# Patient Record
Sex: MALE | Race: WHITE | Employment: OTHER | URBAN - METROPOLITAN AREA
[De-identification: names, ages, dates, MRNs, and addresses within clinical notes are randomized per-mention and may not be internally consistent; named-entity substitution may affect disease eponyms.]

---

## 2018-02-13 ENCOUNTER — HOSPITAL ENCOUNTER (OUTPATIENT)
Dept: OTHER | Age: 43
Discharge: HOME | End: 2018-02-13
Attending: EMERGENCY MEDICINE | Admitting: EMERGENCY MEDICINE

## 2018-02-14 LAB
A/G RATIO: 1.4 (ref 1.1–2.2)
ALBUMIN SERPL-MCNC: 4 G/DL (ref 3.4–5)
ALP BLD-CCNC: 99 U/L (ref 40–129)
ALT SERPL-CCNC: 9 U/L (ref 10–40)
ANION GAP SERPL CALCULATED.3IONS-SCNC: 14 MMOL/L (ref 3–16)
AST SERPL-CCNC: 19 U/L (ref 15–37)
BASOPHILS ABSOLUTE: 0.1 K/UL (ref 0–0.2)
BASOPHILS RELATIVE PERCENT: 1.2 %
BILIRUB SERPL-MCNC: 0.3 MG/DL (ref 0–1)
BUN BLDV-MCNC: 9 MG/DL (ref 7–20)
CALCIUM SERPL-MCNC: 8.9 MG/DL (ref 8.3–10.6)
CHLORIDE BLD-SCNC: 101 MMOL/L (ref 99–110)
CO2: 26 MMOL/L (ref 21–32)
CREAT SERPL-MCNC: 1 MG/DL (ref 0.9–1.3)
EOSINOPHILS ABSOLUTE: 0.1 K/UL (ref 0–0.6)
EOSINOPHILS RELATIVE PERCENT: 0.8 %
GFR AFRICAN AMERICAN: >60
GFR NON-AFRICAN AMERICAN: >60
GLOBULIN: 2.9 G/DL
GLUCOSE BLD-MCNC: 74 MG/DL (ref 70–99)
HBV SURFACE AB TITR SER: 8.55 MIU/ML
HCT VFR BLD CALC: 48.4 % (ref 40.5–52.5)
HEMOGLOBIN: 15.6 G/DL (ref 13.5–17.5)
HEPATITIS C ANTIBODY INTERPRETATION: NORMAL
LYMPHOCYTES ABSOLUTE: 1.5 K/UL (ref 1–5.1)
LYMPHOCYTES RELATIVE PERCENT: 18.9 %
MCH RBC QN AUTO: 30.9 PG (ref 26–34)
MCHC RBC AUTO-ENTMCNC: 32.2 G/DL (ref 31–36)
MCV RBC AUTO: 96.1 FL (ref 80–100)
MONOCYTES ABSOLUTE: 1.1 K/UL (ref 0–1.3)
MONOCYTES RELATIVE PERCENT: 13.8 %
NEUTROPHILS ABSOLUTE: 5.1 K/UL (ref 1.7–7.7)
NEUTROPHILS RELATIVE PERCENT: 65.3 %
PDW BLD-RTO: 14.9 % (ref 12.4–15.4)
PLATELET # BLD: 247 K/UL (ref 135–450)
PMV BLD AUTO: 10.4 FL (ref 5–10.5)
POTASSIUM SERPL-SCNC: 3.7 MMOL/L (ref 3.5–5.1)
RBC # BLD: 5.03 M/UL (ref 4.2–5.9)
SODIUM BLD-SCNC: 141 MMOL/L (ref 136–145)
T4 FREE: 1.1 NG/DL (ref 0.9–1.8)
TOTAL PROTEIN: 6.9 G/DL (ref 6.4–8.2)
TSH SERPL DL<=0.05 MIU/L-ACNC: 1.07 UIU/ML (ref 0.27–4.2)
WBC # BLD: 7.8 K/UL (ref 4–11)

## 2018-02-15 LAB
HIV AG/AB: NORMAL
HIV ANTIGEN: NORMAL
HIV-1 ANTIBODY: NORMAL
HIV-2 AB: NORMAL
RPR: NORMAL

## 2018-02-16 LAB
EER HCV RNA QNT PCR: NORMAL
HCV RNA QNT REAL-TIME PCR INTERP: NOT DETECTED
HCV RNA, QUANTITATIVE REAL TIME PCR: <1.2 LOG IU
HEPATITIS C RNA PCR QUANT: <15 IU/ML

## 2018-08-09 ENCOUNTER — HOSPITAL ENCOUNTER (OUTPATIENT)
Dept: NON INVASIVE DIAGNOSTICS | Age: 43
Discharge: HOME | End: 2018-08-09
Payer: MEDICARE

## 2018-08-09 NOTE — PROGRESS NOTES
Pt unable to completed GXT duration < 50 sec due to Leg discomfort / poor  Exercise tolerance  . Call placed to ordering physician's office with update.  Pt denied chest discomfort released with instructions to follow up with MD's office

## 2018-08-29 ENCOUNTER — TELEPHONE (OUTPATIENT)
Dept: FAMILY MEDICINE CLINIC | Age: 43
End: 2018-08-29

## 2018-08-29 NOTE — TELEPHONE ENCOUNTER
Scheduled new pt appt for 9/21. Has medicare. Said he no longer has the medicaid. Told him if patient's are on controlled meds they may be referred out to specialist to manage those meds. Said he is on gabapentin. Charlotte Johnson it is the only thing that helps his back. Asking if that is a medication that can be prescribed by our office of if he will need to see a specialist for it.

## 2018-08-31 ENCOUNTER — TELEPHONE (OUTPATIENT)
Dept: FAMILY MEDICINE CLINIC | Age: 43
End: 2018-08-31

## 2018-08-31 NOTE — TELEPHONE ENCOUNTER
Patient called back regarding the previous message that was closed out  States he used to have a psychiatrist but no longer does and it's been awhile  He will discuss medications and a referral at his appt unless Dr Coretta Savage would like to refer him to a psychatrist and get an appt as well  Please advise

## 2018-08-31 NOTE — TELEPHONE ENCOUNTER
We will talk about it on the first visit.  Typically I would prefer for him to see a psychiatrist but would not mind sending in some of his medicines until he can get a psychiatrist.

## 2018-09-21 ENCOUNTER — OFFICE VISIT (OUTPATIENT)
Dept: FAMILY MEDICINE CLINIC | Age: 43
End: 2018-09-21

## 2018-09-21 ENCOUNTER — TELEPHONE (OUTPATIENT)
Dept: FAMILY MEDICINE CLINIC | Age: 43
End: 2018-09-21

## 2018-09-21 VITALS
OXYGEN SATURATION: 96 % | HEART RATE: 92 BPM | DIASTOLIC BLOOD PRESSURE: 82 MMHG | SYSTOLIC BLOOD PRESSURE: 124 MMHG | WEIGHT: 118 LBS | BODY MASS INDEX: 17.48 KG/M2 | HEIGHT: 69 IN

## 2018-09-21 DIAGNOSIS — F33.3 SEVERE RECURRENT MAJOR DEPRESSIVE DISORDER WITH PSYCHOTIC FEATURES (HCC): ICD-10-CM

## 2018-09-21 DIAGNOSIS — G89.29 CHRONIC BILATERAL THORACIC BACK PAIN: ICD-10-CM

## 2018-09-21 DIAGNOSIS — M54.6 CHRONIC BILATERAL THORACIC BACK PAIN: ICD-10-CM

## 2018-09-21 DIAGNOSIS — F03.90 DEMENTIA WITHOUT BEHAVIORAL DISTURBANCE, UNSPECIFIED DEMENTIA TYPE: ICD-10-CM

## 2018-09-21 DIAGNOSIS — G43.109 MIGRAINE WITH AURA AND WITHOUT STATUS MIGRAINOSUS, NOT INTRACTABLE: Primary | ICD-10-CM

## 2018-09-21 DIAGNOSIS — Z23 NEED FOR PNEUMOCOCCAL VACCINATION: ICD-10-CM

## 2018-09-21 DIAGNOSIS — F51.01 PRIMARY INSOMNIA: ICD-10-CM

## 2018-09-21 PROCEDURE — G8427 DOCREV CUR MEDS BY ELIG CLIN: HCPCS | Performed by: FAMILY MEDICINE

## 2018-09-21 PROCEDURE — G8419 CALC BMI OUT NRM PARAM NOF/U: HCPCS | Performed by: FAMILY MEDICINE

## 2018-09-21 PROCEDURE — 4004F PT TOBACCO SCREEN RCVD TLK: CPT | Performed by: FAMILY MEDICINE

## 2018-09-21 PROCEDURE — G0009 ADMIN PNEUMOCOCCAL VACCINE: HCPCS | Performed by: FAMILY MEDICINE

## 2018-09-21 PROCEDURE — 99203 OFFICE O/P NEW LOW 30 MIN: CPT | Performed by: FAMILY MEDICINE

## 2018-09-21 PROCEDURE — 90732 PPSV23 VACC 2 YRS+ SUBQ/IM: CPT | Performed by: FAMILY MEDICINE

## 2018-09-21 RX ORDER — TRAZODONE HYDROCHLORIDE 100 MG/1
100 TABLET ORAL NIGHTLY
Qty: 90 TABLET | Refills: 1 | Status: SHIPPED | OUTPATIENT
Start: 2018-09-21 | End: 2018-11-08

## 2018-09-21 RX ORDER — CITALOPRAM 20 MG/1
20 TABLET ORAL DAILY
Qty: 90 TABLET | Refills: 1 | Status: SHIPPED | OUTPATIENT
Start: 2018-09-21 | End: 2018-11-15

## 2018-09-21 RX ORDER — GABAPENTIN 600 MG/1
600 TABLET ORAL 3 TIMES DAILY
Qty: 90 TABLET | Refills: 2 | Status: SHIPPED | OUTPATIENT
Start: 2018-09-21 | End: 2018-11-26 | Stop reason: SDUPTHER

## 2018-09-21 ASSESSMENT — ENCOUNTER SYMPTOMS: BACK PAIN: 1

## 2018-10-29 ENCOUNTER — TELEPHONE (OUTPATIENT)
Dept: FAMILY MEDICINE CLINIC | Age: 43
End: 2018-10-29

## 2018-10-29 DIAGNOSIS — F03.90 DEMENTIA WITHOUT BEHAVIORAL DISTURBANCE, UNSPECIFIED DEMENTIA TYPE: Primary | ICD-10-CM

## 2018-10-31 ENCOUNTER — TELEPHONE (OUTPATIENT)
Dept: FAMILY MEDICINE CLINIC | Age: 43
End: 2018-10-31

## 2018-11-07 ENCOUNTER — TELEPHONE (OUTPATIENT)
Dept: ORTHOPEDIC SURGERY | Age: 43
End: 2018-11-07

## 2018-11-07 ENCOUNTER — APPOINTMENT (OUTPATIENT)
Dept: GENERAL RADIOLOGY | Age: 43
End: 2018-11-07
Payer: MEDICARE

## 2018-11-07 ENCOUNTER — HOSPITAL ENCOUNTER (EMERGENCY)
Age: 43
Discharge: HOME | End: 2018-11-07
Attending: EMERGENCY MEDICINE
Payer: MEDICARE

## 2018-11-07 VITALS
DIASTOLIC BLOOD PRESSURE: 76 MMHG | RESPIRATION RATE: 14 BRPM | SYSTOLIC BLOOD PRESSURE: 101 MMHG | BODY MASS INDEX: 19.11 KG/M2 | HEIGHT: 69 IN | WEIGHT: 129 LBS | OXYGEN SATURATION: 93 % | TEMPERATURE: 98.2 F | HEART RATE: 83 BPM

## 2018-11-07 DIAGNOSIS — S42.201A CLOSED FRACTURE OF PROXIMAL END OF RIGHT HUMERUS, UNSPECIFIED FRACTURE MORPHOLOGY, INITIAL ENCOUNTER: Primary | ICD-10-CM

## 2018-11-07 PROCEDURE — 73060 X-RAY EXAM OF HUMERUS: CPT

## 2018-11-07 PROCEDURE — 99283 EMERGENCY DEPT VISIT LOW MDM: CPT

## 2018-11-07 PROCEDURE — 36415 COLL VENOUS BLD VENIPUNCTURE: CPT

## 2018-11-07 RX ORDER — BUPRENORPHINE 2 MG/1
16 TABLET SUBLINGUAL DAILY
COMMUNITY

## 2018-11-07 RX ORDER — OXYCODONE HYDROCHLORIDE AND ACETAMINOPHEN 5; 325 MG/1; MG/1
1-2 TABLET ORAL EVERY 6 HOURS PRN
Qty: 10 TABLET | Refills: 0 | Status: SHIPPED | OUTPATIENT
Start: 2018-11-07 | End: 2018-11-14

## 2018-11-07 ASSESSMENT — PAIN DESCRIPTION - LOCATION: LOCATION: ARM

## 2018-11-07 NOTE — ED PROVIDER NOTES
daily for 180 days. .    NEBULIZER MISC    by Does not apply route daily    RISPERIDONE (RISPERDAL) 2 MG TABLET    Take 1.5 tablets by mouth nightly    TOPIRAMATE (TOPAMAX) 50 MG TABLET    Take 50 mg by mouth 2 times daily    TRAZODONE (DESYREL) 100 MG TABLET    Take 1 tablet by mouth nightly       ALLERGIES     Mustard seed    FAMILY HISTORY       Family History   Problem Relation Age of Onset    Diabetes Mother     Cancer Mother     Cancer Father           SOCIAL HISTORY       Social History     Social History    Marital status:      Spouse name: Autumn Douglas Number of children: 0    Years of education: 12     Social History Main Topics    Smoking status: Current Every Day Smoker     Packs/day: 1.00     Years: 35.00     Types: Cigarettes    Smokeless tobacco: Never Used    Alcohol use Yes      Comment: socially    Drug use: No    Sexual activity: Yes     Partners: Female     Other Topics Concern    None     Social History Narrative    None       SCREENINGS      @FLOW([id number])@      PHYSICAL EXAM    (up to 7 for level 4, 8 or more for level 5)     ED Triage Vitals [11/07/18 0445]   BP Temp Temp Source Pulse Resp SpO2 Height Weight   -- 98.2 °F (36.8 °C) Oral -- -- -- 5' 9\" (1.753 m) 129 lb (58.5 kg)       Physical Exam      General Appearance:  Alert, cooperative, no distress, appears stated age. Head:  Normocephalic, without obviousabnormality, atraumatic. Eyes:  conjunctiva/corneas clear, EOM's intact. Sclera anicteric. ENT: Mucous membranes moist.   Neck: Supple, symmetrical, trachea midline, no adenopathy. No jugular venous distention. Lungs:   Clear to auscultation bilaterally, respirationsunlabored. No rales, rhonchi or wheezes. Chest Wall:  No tenderness. Heart:  Regular rate and rhythm, S1 and S2 normal, no murmur, rub or gallop. Abdomen:   Soft, non-tender, bowel sounds active,   no masses, no organomegaly. Extremities: No edema, cords or calf tenderness.  Verdia Belts
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
## 2018-11-07 NOTE — ED NOTES
Second call placed to Ortho per Dr. Janette Bell request @ [facility] 53  11/07/18 7691

## 2018-11-09 ENCOUNTER — TELEPHONE (OUTPATIENT)
Dept: FAMILY MEDICINE CLINIC | Age: 43
End: 2018-11-09

## 2018-11-09 ENCOUNTER — OFFICE VISIT (OUTPATIENT)
Dept: FAMILY MEDICINE CLINIC | Age: 43
End: 2018-11-09
Payer: MEDICARE

## 2018-11-09 VITALS
WEIGHT: 128 LBS | TEMPERATURE: 98.7 F | OXYGEN SATURATION: 97 % | HEART RATE: 109 BPM | BODY MASS INDEX: 18.9 KG/M2 | DIASTOLIC BLOOD PRESSURE: 80 MMHG | SYSTOLIC BLOOD PRESSURE: 122 MMHG

## 2018-11-09 DIAGNOSIS — S42.351D CLOSED DISPLACED COMMINUTED FRACTURE OF SHAFT OF RIGHT HUMERUS WITH ROUTINE HEALING, SUBSEQUENT ENCOUNTER: ICD-10-CM

## 2018-11-09 DIAGNOSIS — Z01.818 PRE-OP EXAMINATION: Primary | ICD-10-CM

## 2018-11-09 LAB
A/G RATIO: 1.6 (ref 1.1–2.2)
ALBUMIN SERPL-MCNC: 4.1 G/DL (ref 3.4–5)
ALP BLD-CCNC: 103 U/L (ref 40–129)
ALT SERPL-CCNC: 13 U/L (ref 10–40)
ANION GAP SERPL CALCULATED.3IONS-SCNC: 14 MMOL/L (ref 3–16)
AST SERPL-CCNC: 27 U/L (ref 15–37)
BASOPHILS ABSOLUTE: 0.1 K/UL (ref 0–0.2)
BASOPHILS RELATIVE PERCENT: 0.7 %
BILIRUB SERPL-MCNC: 0.6 MG/DL (ref 0–1)
BUN BLDV-MCNC: 7 MG/DL (ref 7–20)
CALCIUM SERPL-MCNC: 9.6 MG/DL (ref 8.3–10.6)
CHLORIDE BLD-SCNC: 94 MMOL/L (ref 99–110)
CO2: 29 MMOL/L (ref 21–32)
CREAT SERPL-MCNC: 0.8 MG/DL (ref 0.9–1.3)
EOSINOPHILS ABSOLUTE: 0.1 K/UL (ref 0–0.6)
EOSINOPHILS RELATIVE PERCENT: 0.9 %
GFR AFRICAN AMERICAN: >60
GFR NON-AFRICAN AMERICAN: >60
GLOBULIN: 2.6 G/DL
GLUCOSE BLD-MCNC: 84 MG/DL (ref 70–99)
HCT VFR BLD CALC: 44.6 % (ref 40.5–52.5)
HEMOGLOBIN: 15.1 G/DL (ref 13.5–17.5)
LYMPHOCYTES ABSOLUTE: 1.6 K/UL (ref 1–5.1)
LYMPHOCYTES RELATIVE PERCENT: 15.2 %
MCH RBC QN AUTO: 32.1 PG (ref 26–34)
MCHC RBC AUTO-ENTMCNC: 34 G/DL (ref 31–36)
MCV RBC AUTO: 94.6 FL (ref 80–100)
MONOCYTES ABSOLUTE: 1.1 K/UL (ref 0–1.3)
MONOCYTES RELATIVE PERCENT: 10.5 %
NEUTROPHILS ABSOLUTE: 7.9 K/UL (ref 1.7–7.7)
NEUTROPHILS RELATIVE PERCENT: 72.7 %
PDW BLD-RTO: 15 % (ref 12.4–15.4)
PLATELET # BLD: 255 K/UL (ref 135–450)
PMV BLD AUTO: 9.4 FL (ref 5–10.5)
POTASSIUM SERPL-SCNC: 3.6 MMOL/L (ref 3.5–5.1)
RBC # BLD: 4.71 M/UL (ref 4.2–5.9)
SODIUM BLD-SCNC: 137 MMOL/L (ref 136–145)
TOTAL PROTEIN: 6.7 G/DL (ref 6.4–8.2)
WBC # BLD: 10.8 K/UL (ref 4–11)

## 2018-11-09 PROCEDURE — 4004F PT TOBACCO SCREEN RCVD TLK: CPT | Performed by: FAMILY MEDICINE

## 2018-11-09 PROCEDURE — 93000 ELECTROCARDIOGRAM COMPLETE: CPT | Performed by: FAMILY MEDICINE

## 2018-11-09 PROCEDURE — 99214 OFFICE O/P EST MOD 30 MIN: CPT | Performed by: FAMILY MEDICINE

## 2018-11-09 PROCEDURE — G8420 CALC BMI NORM PARAMETERS: HCPCS | Performed by: FAMILY MEDICINE

## 2018-11-09 PROCEDURE — G8484 FLU IMMUNIZE NO ADMIN: HCPCS | Performed by: FAMILY MEDICINE

## 2018-11-09 PROCEDURE — G8427 DOCREV CUR MEDS BY ELIG CLIN: HCPCS | Performed by: FAMILY MEDICINE

## 2018-11-09 NOTE — TELEPHONE ENCOUNTER
Hello,  I was wondering if you could take a look at an EKG to see if is concerning for this patient going into a preop exam. He does have a history of a stroke. Thank you for your time.    Dr. Beni Sarah

## 2018-11-12 ENCOUNTER — HOSPITAL ENCOUNTER (OUTPATIENT)
Dept: GENERAL RADIOLOGY | Age: 43
Discharge: HOME | End: 2018-11-12
Payer: MEDICARE

## 2018-11-12 ENCOUNTER — TELEPHONE (OUTPATIENT)
Dept: FAMILY MEDICINE CLINIC | Age: 43
End: 2018-11-12

## 2018-11-12 DIAGNOSIS — S42.409S: Primary | ICD-10-CM

## 2018-11-12 DIAGNOSIS — T14.8XXA FRACTURE: ICD-10-CM

## 2018-11-12 PROBLEM — S42.309A FRACTURE, HUMERUS: Status: ACTIVE | Noted: 2018-11-12

## 2018-11-12 NOTE — TELEPHONE ENCOUNTER
Looks like his appt for cardiac clearance is not scheduled until 11/29. Please see if he can be seen sooner given this is for clearance for surgery. I feels that is too long to wait for this surgery.

## 2018-11-12 NOTE — TELEPHONE ENCOUNTER
Patient calling requesting more percocet, he was scheduled for surgery this week had to reschedule due to needing  Cardiac clearance, surgery has not been rescheduled yet but he will be out soon.

## 2018-11-13 ENCOUNTER — TELEPHONE (OUTPATIENT)
Dept: FAMILY MEDICINE CLINIC | Age: 43
End: 2018-11-13

## 2018-11-13 DIAGNOSIS — S42.201A CLOSED FRACTURE OF PROXIMAL END OF RIGHT HUMERUS, UNSPECIFIED FRACTURE MORPHOLOGY, INITIAL ENCOUNTER: ICD-10-CM

## 2018-11-13 NOTE — TELEPHONE ENCOUNTER
Last Seen: 11/9/2018    Last Written: 11-7-18    Last UDS: 4-12-18    OARRS Run On: 9-21-18    Med Agreement Signed On: do not see one    Next Appointment: 12/21/2018    Requested Prescriptions     Pending Prescriptions Disp Refills    oxyCODONE-acetaminophen (PERCOCET) 5-325 MG per tablet 10 tablet 0     Sig: Take 1-2 tablets by mouth every 6 hours as needed for Pain for up to 7 days. Jono Barajas

## 2018-11-14 ENCOUNTER — TELEPHONE (OUTPATIENT)
Dept: CARDIOLOGY CLINIC | Age: 43
End: 2018-11-14

## 2018-11-14 NOTE — TELEPHONE ENCOUNTER
Spoke to Dr. Aretha Garcia. He would like to add this patient on to his schedule tomorrow AM (7:45 or 8:15 am). Please call to schedule.    Thank you, Ethan Iglesias

## 2018-11-15 ENCOUNTER — OFFICE VISIT (OUTPATIENT)
Dept: CARDIOLOGY CLINIC | Age: 43
End: 2018-11-15
Payer: MEDICARE

## 2018-11-15 VITALS
OXYGEN SATURATION: 98 % | HEIGHT: 69 IN | WEIGHT: 127.2 LBS | SYSTOLIC BLOOD PRESSURE: 112 MMHG | BODY MASS INDEX: 18.84 KG/M2 | HEART RATE: 100 BPM | DIASTOLIC BLOOD PRESSURE: 80 MMHG

## 2018-11-15 DIAGNOSIS — R06.02 SOB (SHORTNESS OF BREATH): ICD-10-CM

## 2018-11-15 DIAGNOSIS — R07.9 CHEST PAIN, UNSPECIFIED TYPE: Primary | ICD-10-CM

## 2018-11-15 DIAGNOSIS — Z01.810 PREOP CARDIOVASCULAR EXAM: ICD-10-CM

## 2018-11-15 PROCEDURE — 99204 OFFICE O/P NEW MOD 45 MIN: CPT | Performed by: INTERNAL MEDICINE

## 2018-11-15 RX ORDER — OXYCODONE HYDROCHLORIDE AND ACETAMINOPHEN 5; 325 MG/1; MG/1
2 TABLET ORAL EVERY 4 HOURS PRN
COMMUNITY
End: 2018-11-20 | Stop reason: ALTCHOICE

## 2018-11-15 RX ORDER — OXYCODONE HYDROCHLORIDE AND ACETAMINOPHEN 5; 325 MG/1; MG/1
1-2 TABLET ORAL EVERY 6 HOURS PRN
Qty: 10 TABLET | Refills: 0 | Status: CANCELLED | OUTPATIENT
Start: 2018-11-15 | End: 2018-11-22

## 2018-11-15 RX ORDER — FLUOXETINE HYDROCHLORIDE 40 MG/1
40 CAPSULE ORAL DAILY
Qty: 30 CAPSULE | Refills: 0 | Status: SHIPPED | OUTPATIENT
Start: 2018-11-15 | End: 2018-12-27 | Stop reason: SDUPTHER

## 2018-11-15 RX ORDER — IBUPROFEN 600 MG/1
600 TABLET ORAL EVERY 6 HOURS PRN
COMMUNITY
End: 2018-12-07

## 2018-11-15 RX ORDER — ALBUTEROL SULFATE 90 UG/1
2 AEROSOL, METERED RESPIRATORY (INHALATION) EVERY 6 HOURS PRN
Qty: 1 INHALER | Refills: 0 | Status: CANCELLED | OUTPATIENT
Start: 2018-11-15

## 2018-11-15 RX ORDER — ALBUTEROL SULFATE 90 UG/1
2 AEROSOL, METERED RESPIRATORY (INHALATION) EVERY 6 HOURS PRN
Qty: 1 INHALER | Refills: 0 | Status: SHIPPED | OUTPATIENT
Start: 2018-11-15 | End: 2018-12-27 | Stop reason: SDUPTHER

## 2018-11-15 NOTE — LETTER
[address] Cardiology - [address] [facility] [address]  Phone: [phone]  Fax: [phone]    Flor Burnham MD        November 15, 2018     [address] [place] [address]  [address]. [address]    Patient: Federico Cranker  MR Number: [id number]  YOB: 1975  Date of Visit: 11/15/2018    Dear  [address] [place] [address]:    I recently saw our mutual patient, listed above. Below are the relevant portions of my assessment and plan of care. [address]   CARDIAC EVALUATION NOTE  [phone]      PCP:  [address] [place] [address], DO    Reason for Consultation/Chief Complaint:  Cardiovascular preop - fx humerus. Abnormal EKG    Subjective   History of Present Illness:  Federico Cranker is a 37 y.o. patient who presents for Cardiac Clearance due to abnormal EKG. He is a patient of Dr. Kristin Ng, who is out of town. Patient has a displaced fx humerus and is needing surgery soon. Today he reports feeling ok. He states he was arm wrestling and broke his arm. He states he has had previous strokes and also a heart attack which he was seen at [facility]. He does smoke and has a couple drinks a day. He is on disability due to his back. He states he tries to walks 3 miles daily with a cane but is limited by joint issues. He has had cp/sob but overall he is a poor historian and hx is difficult to obtain from him. Past Medical History:   has a past medical history of Arthritis; Back disorder; COPD (chronic obstructive pulmonary disease) ([facility] [address] Memory loss; Panic attacks; Psychiatric problem; and Unspecified cerebral artery occlusion with cerebral infarction. Surgical History:   has no past surgical history on file. Social History:   reports that he has been smoking Cigarettes. He has a 35.00 pack-year smoking history. He has never used smokeless tobacco. He reports that he drinks alcohol. He reports that he does not use drugs.  Severe recurrent major depressive disorder with psychotic features ([facility] [address]    Fracture, humerus    Preop cardiovascular exam                Plan    1. Echocardiogram for h/o cva/mi tx at [address], reported cp/sob   2. Stress Test (Lexiscan)h/o cva/mi tx at [facility], reported cp/sob and poor fxnal status   3. We will call you with the results. If the tests are normal, you may continue with surgery. 4. Follow up in 1-2 months with NP (only if tests are abnormal)    If noninvasive testing Is unremarkale, pt would be considered at low risk for arm fracture surgery      Thank you for allowing us to participate in the care of [facility]. Please call me with any questions [phone]. This note was scribed in the presence of Dr. Mamta Vásquez MD by Ana Laura Campa RN. Sonya Mancera MD, [facility]   Interventional Cardiologist  [place] [address]  [phone] [facility]  [phone] [address] [place] [address]  11/15/2018 8:51 AM    I will address the patient's cardiac risk factors and adjusted pharmacologic treatment as needed. In addition, I have reinforced the need for patient directed risk factor modification. Tobacco use was discussed with the patient and educated on the negative effects and was asked not to use. All questions and concerns were addressed to the patient/family. Alternatives to my treatment were discussed. I, Dr Sonya Mancera, personally performed the services described in this documentation, as scribed by the above signed scribe in my presence. It is both accurate and complete to my knowledge. I agree with the details independently gathered by the clinical support staff and the scribed note accurately describes my personal service to the patient. If you have questions, please do not hesitate to call me. I look forward to following Zoë Guillen along with you.     Sincerely,        Regan Alejo MD

## 2018-11-15 NOTE — PROGRESS NOTES
hours as needed for Pain   Yes Historical Provider, MD   buprenorphine (SUBUTEX) 2 MG SUBL SL tablet Place 12 mg under the tongue daily. .   Yes Historical Provider, MD   Nebulizer MISC by Does not apply route daily   Yes Historical Provider, MD   gabapentin (NEURONTIN) 600 MG tablet Take 1 tablet by mouth 3 times daily for 180 days. . 9/21/18 3/20/19 Yes Eric Alonso, DO   citalopram (CELEXA) 20 MG tablet Take 1 tablet by mouth daily 9/21/18  Yes Eric Alonso, DO   topiramate (TOPAMAX) 50 MG tablet Take 50 mg by mouth 2 times daily   Yes Historical Provider, MD   albuterol sulfate  (90 Base) MCG/ACT inhaler Inhale 2 puffs into the lungs every 6 hours as needed for Wheezing 1/1/18  Yes LING Bello CNP   risperiDONE (RISPERDAL) 2 MG tablet Take 1.5 tablets by mouth nightly 1/1/18  Yes LING Bello CNP   donepezil (ARICEPT) 5 MG tablet Take 1 tablet by mouth nightly 1/1/18  Yes LING Bello CNP   aspirin 325 MG tablet Take 325 mg by mouth daily. Yes Historical Provider, MD          Allergies:  Mustard seed     Review of Systems:   A 14 point review of symptoms completed. Pertinent positives identified in the HPI, all other review of symptoms negative as below.       Objective   PHYSICAL EXAM:    Vitals:    11/15/18 0826   BP: 112/80   Pulse: 100   SpO2: 98%    Weight: 127 lb 3.2 oz (57.7 kg)         General Appearance:  Alert, cooperative, no distress, appears stated age   Head:  Normocephalic, without obvious abnormality, atraumatic   Eyes:  PERRL, conjunctiva/corneas clear   Nose: Nares normal, no drainage or sinus tenderness   Throat: Lips, mucosa, and tongue normal   Neck: Supple, symmetrical, trachea midline, no adenopathy, thyroid: not enlarged, symmetric, no tenderness/mass/nodules, no carotid bruit or JVD   Lungs:   Clear to auscultation bilaterally, respirations unlabored   Chest Wall:  No deformity or tenderness   Heart:  Regular rate and rhythm, S1, S2

## 2018-11-15 NOTE — COMMUNICATION BODY
hours as needed for Pain   Yes Historical Provider, MD   buprenorphine (SUBUTEX) 2 MG SUBL SL tablet Place 12 mg under the tongue daily. .   Yes Historical Provider, MD   Nebulizer MISC by Does not apply route daily   Yes Historical Provider, MD   gabapentin (NEURONTIN) 600 MG tablet Take 1 tablet by mouth 3 times daily for 180 days. . 9/21/18 3/20/19 Yes Eric Alonso, DO   citalopram (CELEXA) 20 MG tablet Take 1 tablet by mouth daily 9/21/18  Yes Eric Alonso, DO   topiramate (TOPAMAX) 50 MG tablet Take 50 mg by mouth 2 times daily   Yes Historical Provider, MD   albuterol sulfate  (90 Base) MCG/ACT inhaler Inhale 2 puffs into the lungs every 6 hours as needed for Wheezing 1/1/18  Yes LING Anderson CNP   risperiDONE (RISPERDAL) 2 MG tablet Take 1.5 tablets by mouth nightly 1/1/18  Yes LING Anderson CNP   donepezil (ARICEPT) 5 MG tablet Take 1 tablet by mouth nightly 1/1/18  Yes LING Anderson CNP   aspirin 325 MG tablet Take 325 mg by mouth daily. Yes Historical Provider, MD          Allergies:  Mustard seed     Review of Systems:   A 14 point review of symptoms completed. Pertinent positives identified in the HPI, all other review of symptoms negative as below.       Objective   PHYSICAL EXAM:    Vitals:    11/15/18 0826   BP: 112/80   Pulse: 100   SpO2: 98%    Weight: 127 lb 3.2 oz (57.7 kg)         General Appearance:  Alert, cooperative, no distress, appears stated age   Head:  Normocephalic, without obvious abnormality, atraumatic   Eyes:  PERRL, conjunctiva/corneas clear   Nose: Nares normal, no drainage or sinus tenderness   Throat: Lips, mucosa, and tongue normal   Neck: Supple, symmetrical, trachea midline, no adenopathy, thyroid: not enlarged, symmetric, no tenderness/mass/nodules, no carotid bruit or JVD   Lungs:   Clear to auscultation bilaterally, respirations unlabored   Chest Wall:  No deformity or tenderness   Heart:  Regular rate and rhythm, S1, S2 normal, 1/6 sm   Abdomen:   Soft, non-tender, bowel sounds active all four quadrants,  no masses, no organomegaly   Extremities: Extremities normal, atraumatic, no cyanosis or edema except for rt arm which is wrapped and has +2 edema and is in sling   Pulses: 2+ and symmetric   Skin: Skin color, texture, turgor normal, no rashes or lesions   Pysch: Normal mood and affect   Neurologic: Normal gross motor and sensory exam.         Labs   CBC:   Lab Results   Component Value Date    WBC 10.8 11/09/2018    RBC 4.71 11/09/2018    HGB 15.1 11/09/2018    HCT 44.6 11/09/2018    MCV 94.6 11/09/2018    RDW 15.0 11/09/2018     11/09/2018     CMP:  Lab Results   Component Value Date     11/09/2018    K 3.6 11/09/2018    CL 94 11/09/2018    CO2 29 11/09/2018    BUN 7 11/09/2018    CREATININE 0.8 11/09/2018    GFRAA >60 11/09/2018    AGRATIO 1.6 11/09/2018    LABGLOM >60 11/09/2018    GLUCOSE 84 11/09/2018    PROT 6.7 11/09/2018    CALCIUM 9.6 11/09/2018    BILITOT 0.6 11/09/2018    ALKPHOS 103 11/09/2018    AST 27 11/09/2018    ALT 13 11/09/2018     PT/INR:  No results found for: PTINR  HgBA1c:No results found for: LABA1C  No results found for: CKTOTAL, CKMB, CKMBINDEX, TROPONINI      Cardiac Data     Last EKG:     nsr rt axis, prev ekgs did not have rt axis    Echo:    Stress Test:       Cath:    Studies:     Assessment        Patient Active Problem List   Diagnosis    Severe recurrent major depressive disorder with psychotic features ([facility] [address]    Fracture, humerus    Preop cardiovascular exam                Plan    1. Echocardiogram for h/o cva/mi tx at [facility], reported cp/sob   2. Stress Test (Lexiscan)h/o cva/mi tx at [facility], reported cp/sob and poor fxnal status   3. We will call you with the results. If the tests are normal, you may continue with surgery.     4. Follow up in 1-2 months with NP (only if tests are abnormal)    If noninvasive testing Is unremarkale, pt would be considered at low risk for

## 2018-11-20 ENCOUNTER — TELEPHONE (OUTPATIENT)
Dept: FAMILY MEDICINE CLINIC | Age: 43
End: 2018-11-20

## 2018-11-20 DIAGNOSIS — S42.202D CLOSED FRACTURE OF PROXIMAL END OF LEFT HUMERUS WITH ROUTINE HEALING, UNSPECIFIED FRACTURE MORPHOLOGY, SUBSEQUENT ENCOUNTER: Primary | ICD-10-CM

## 2018-11-20 RX ORDER — OXYCODONE HYDROCHLORIDE AND ACETAMINOPHEN 5; 325 MG/1; MG/1
2 TABLET ORAL EVERY 6 HOURS PRN
Qty: 40 TABLET | Refills: 0 | Status: SHIPPED | OUTPATIENT
Start: 2018-11-20 | End: 2018-11-25

## 2018-11-21 RX ORDER — OXYCODONE HYDROCHLORIDE AND ACETAMINOPHEN 5; 325 MG/1; MG/1
2 TABLET ORAL EVERY 4 HOURS PRN
OUTPATIENT
Start: 2018-11-21

## 2018-11-26 ENCOUNTER — TELEPHONE (OUTPATIENT)
Dept: FAMILY MEDICINE CLINIC | Age: 43
End: 2018-11-26

## 2018-11-26 DIAGNOSIS — G89.29 CHRONIC BILATERAL THORACIC BACK PAIN: ICD-10-CM

## 2018-11-26 DIAGNOSIS — M54.6 CHRONIC BILATERAL THORACIC BACK PAIN: ICD-10-CM

## 2018-11-26 DIAGNOSIS — S42.202D CLOSED FRACTURE OF PROXIMAL END OF LEFT HUMERUS WITH ROUTINE HEALING, UNSPECIFIED FRACTURE MORPHOLOGY, SUBSEQUENT ENCOUNTER: ICD-10-CM

## 2018-11-26 RX ORDER — GABAPENTIN 600 MG/1
600 TABLET ORAL 3 TIMES DAILY
Qty: 90 TABLET | Refills: 2 | Status: CANCELLED | OUTPATIENT
Start: 2018-11-26 | End: 2019-05-25

## 2018-11-26 RX ORDER — GABAPENTIN 600 MG/1
1200 TABLET ORAL 3 TIMES DAILY
Qty: 540 TABLET | Refills: 1 | Status: SHIPPED | OUTPATIENT
Start: 2018-11-26 | End: 2018-12-27 | Stop reason: SDUPTHER

## 2018-11-27 ENCOUNTER — TELEPHONE (OUTPATIENT)
Dept: FAMILY MEDICINE CLINIC | Age: 43
End: 2018-11-27

## 2018-11-27 RX ORDER — OXYCODONE HYDROCHLORIDE AND ACETAMINOPHEN 5; 325 MG/1; MG/1
2 TABLET ORAL EVERY 6 HOURS PRN
Qty: 40 TABLET | Refills: 0 | OUTPATIENT
Start: 2018-11-27 | End: 2018-12-02

## 2018-11-27 RX ORDER — ALBUTEROL SULFATE 2.5 MG/3ML
2.5 SOLUTION RESPIRATORY (INHALATION) EVERY 6 HOURS PRN
Qty: 120 EACH | Refills: 3 | Status: SHIPPED | OUTPATIENT
Start: 2018-11-27 | End: 2018-12-04 | Stop reason: SDUPTHER

## 2018-11-30 ENCOUNTER — HOSPITAL ENCOUNTER (OUTPATIENT)
Dept: NON INVASIVE DIAGNOSTICS | Age: 43
Discharge: HOME | End: 2018-11-30
Payer: MEDICARE

## 2018-11-30 ENCOUNTER — HOSPITAL ENCOUNTER (OUTPATIENT)
Dept: NUCLEAR MEDICINE | Age: 43
Discharge: HOME | End: 2018-11-30
Payer: MEDICARE

## 2018-11-30 DIAGNOSIS — Z01.810 PREOP CARDIOVASCULAR EXAM: ICD-10-CM

## 2018-11-30 LAB
LV EF: 55 %
LV EF: 60 %
LVEF MODALITY: NORMAL
LVEF MODALITY: NORMAL

## 2018-11-30 PROCEDURE — 6360000002 HC RX W HCPCS: Performed by: INTERNAL MEDICINE

## 2018-11-30 PROCEDURE — 3430000000 HC RX DIAGNOSTIC RADIOPHARMACEUTICAL: Performed by: INTERNAL MEDICINE

## 2018-11-30 PROCEDURE — 93306 TTE W/DOPPLER COMPLETE: CPT

## 2018-11-30 PROCEDURE — 78452 HT MUSCLE IMAGE SPECT MULT: CPT

## 2018-11-30 PROCEDURE — 93017 CV STRESS TEST TRACING ONLY: CPT

## 2018-11-30 PROCEDURE — A9502 TC99M TETROFOSMIN: HCPCS | Performed by: INTERNAL MEDICINE

## 2018-11-30 RX ADMIN — TETROFOSMIN 10.5 MILLICURIE: 0.23 INJECTION, POWDER, LYOPHILIZED, FOR SOLUTION INTRAVENOUS at 07:15

## 2018-11-30 RX ADMIN — REGADENOSON 0.4 MG: 0.08 INJECTION, SOLUTION INTRAVENOUS at 08:30

## 2018-11-30 RX ADMIN — TETROFOSMIN 32 MILLICURIE: 0.23 INJECTION, POWDER, LYOPHILIZED, FOR SOLUTION INTRAVENOUS at 08:30

## 2018-12-03 ENCOUNTER — TELEPHONE (OUTPATIENT)
Dept: CARDIOLOGY CLINIC | Age: 43
End: 2018-12-03

## 2018-12-03 NOTE — TELEPHONE ENCOUNTER
----- Message from Miguel Wick MD sent at 11/30/2018  1:38 PM EST -----  Let him know stress test is negative, he can go ahead and have arm surgery and would be at low risk for that.

## 2018-12-04 DIAGNOSIS — J45.909 ASTHMA, UNSPECIFIED ASTHMA SEVERITY, UNSPECIFIED WHETHER COMPLICATED, UNSPECIFIED WHETHER PERSISTENT: Primary | ICD-10-CM

## 2018-12-04 DIAGNOSIS — R06.83 SNORING: ICD-10-CM

## 2018-12-04 RX ORDER — ALBUTEROL SULFATE 2.5 MG/3ML
2.5 SOLUTION RESPIRATORY (INHALATION) EVERY 6 HOURS PRN
Qty: 120 EACH | Refills: 3 | Status: ON HOLD | OUTPATIENT
Start: 2018-12-04 | End: 2019-01-19 | Stop reason: HOSPADM

## 2018-12-04 NOTE — TELEPHONE ENCOUNTER
Pt called and stated he was having trouble getting his nebulizer solution filled at his pharmacy. [address] [facility] at [phone] spoke to [address] they stated they require a dx code be included with the script and resent. Pt also stated that he wants a sleep apnea mask because he snores a lot. Please Advise.  Thank You

## 2018-12-05 ENCOUNTER — TELEPHONE (OUTPATIENT)
Dept: FAMILY MEDICINE CLINIC | Age: 43
End: 2018-12-05

## 2018-12-05 DIAGNOSIS — R06.83 SNORING: Primary | ICD-10-CM

## 2018-12-05 NOTE — TELEPHONE ENCOUNTER
Pt called back today and asked if we will change his referral to Dr Katina Rendon at [facility].  Referral placed and then faxed to fax # [phone]

## 2018-12-06 ENCOUNTER — TELEPHONE (OUTPATIENT)
Dept: FAMILY MEDICINE CLINIC | Age: 43
End: 2018-12-06

## 2018-12-06 NOTE — TELEPHONE ENCOUNTER
Patient said he was asked by [facility] to have his pre-op from 11/9/18 faxed over to them. Phone number [phone]. Called and was told Dr. Ramin Kirby is with [facility].  Please fax to [phone]

## 2018-12-10 ENCOUNTER — HOSPITAL ENCOUNTER (OUTPATIENT)
Dept: GENERAL RADIOLOGY | Age: 43
Discharge: HOME | End: 2018-12-10
Payer: MEDICARE

## 2018-12-10 ENCOUNTER — HOSPITAL ENCOUNTER (OUTPATIENT)
Age: 43
Setting detail: OUTPATIENT SURGERY
Discharge: HOME | End: 2018-12-10
Attending: ORTHOPAEDIC SURGERY | Admitting: ORTHOPAEDIC SURGERY
Payer: MEDICARE

## 2018-12-10 ENCOUNTER — APPOINTMENT (OUTPATIENT)
Dept: GENERAL RADIOLOGY | Age: 43
End: 2018-12-10
Attending: ORTHOPAEDIC SURGERY
Payer: MEDICARE

## 2018-12-10 ENCOUNTER — SURGICAL CONSULT (OUTPATIENT)
Dept: ORTHOPEDIC SURGERY | Age: 43
End: 2018-12-10

## 2018-12-10 VITALS
TEMPERATURE: 97.7 F | DIASTOLIC BLOOD PRESSURE: 73 MMHG | RESPIRATION RATE: 14 BRPM | SYSTOLIC BLOOD PRESSURE: 106 MMHG | OXYGEN SATURATION: 98 % | WEIGHT: 131 LBS | BODY MASS INDEX: 19.4 KG/M2 | HEIGHT: 69 IN | HEART RATE: 87 BPM

## 2018-12-10 DIAGNOSIS — T14.8XXA FRACTURE: ICD-10-CM

## 2018-12-10 PROCEDURE — 73060 X-RAY EXAM OF HUMERUS: CPT

## 2018-12-10 RX ORDER — SODIUM CHLORIDE 0.9 % (FLUSH) 0.9 %
10 SYRINGE (ML) INJECTION PRN
Status: DISCONTINUED | OUTPATIENT
Start: 2018-12-10 | End: 2018-12-10 | Stop reason: HOSPADM

## 2018-12-10 RX ORDER — SODIUM CHLORIDE, SODIUM LACTATE, POTASSIUM CHLORIDE, CALCIUM CHLORIDE 600; 310; 30; 20 MG/100ML; MG/100ML; MG/100ML; MG/100ML
INJECTION, SOLUTION INTRAVENOUS CONTINUOUS
Status: DISCONTINUED | OUTPATIENT
Start: 2018-12-10 | End: 2018-12-10 | Stop reason: HOSPADM

## 2018-12-10 RX ORDER — SODIUM CHLORIDE 0.9 % (FLUSH) 0.9 %
10 SYRINGE (ML) INJECTION EVERY 12 HOURS SCHEDULED
Status: DISCONTINUED | OUTPATIENT
Start: 2018-12-10 | End: 2018-12-10 | Stop reason: HOSPADM

## 2018-12-10 RX ORDER — HYDROCODONE BITARTRATE AND ACETAMINOPHEN 5; 325 MG/1; MG/1
1 TABLET ORAL EVERY 8 HOURS PRN
Qty: 21 TABLET | Refills: 0 | Status: SHIPPED | OUTPATIENT
Start: 2018-12-10 | End: 2018-12-17

## 2018-12-10 RX ORDER — LIDOCAINE HYDROCHLORIDE 10 MG/ML
1 INJECTION, SOLUTION EPIDURAL; INFILTRATION; INTRACAUDAL; PERINEURAL
Status: DISCONTINUED | OUTPATIENT
Start: 2018-12-10 | End: 2018-12-10 | Stop reason: HOSPADM

## 2018-12-10 ASSESSMENT — PAIN DESCRIPTION - DESCRIPTORS: DESCRIPTORS: CONSTANT;ACHING

## 2018-12-10 ASSESSMENT — PAIN - FUNCTIONAL ASSESSMENT: PAIN_FUNCTIONAL_ASSESSMENT: 0-10

## 2018-12-15 PROBLEM — Z01.810 PREOP CARDIOVASCULAR EXAM: Status: RESOLVED | Noted: 2018-11-15 | Resolved: 2018-12-15

## 2018-12-19 ENCOUNTER — TELEPHONE (OUTPATIENT)
Dept: ORTHOPEDIC SURGERY | Age: 43
End: 2018-12-19

## 2018-12-27 ENCOUNTER — OFFICE VISIT (OUTPATIENT)
Dept: FAMILY MEDICINE CLINIC | Age: 43
End: 2018-12-27
Payer: MEDICARE

## 2018-12-27 VITALS
OXYGEN SATURATION: 98 % | HEART RATE: 87 BPM | DIASTOLIC BLOOD PRESSURE: 84 MMHG | SYSTOLIC BLOOD PRESSURE: 120 MMHG | WEIGHT: 127 LBS | BODY MASS INDEX: 18.75 KG/M2

## 2018-12-27 DIAGNOSIS — F17.210 CIGARETTE NICOTINE DEPENDENCE WITHOUT COMPLICATION: ICD-10-CM

## 2018-12-27 DIAGNOSIS — M54.6 CHRONIC BILATERAL THORACIC BACK PAIN: ICD-10-CM

## 2018-12-27 DIAGNOSIS — G89.29 CHRONIC BILATERAL THORACIC BACK PAIN: ICD-10-CM

## 2018-12-27 DIAGNOSIS — F33.3 SEVERE RECURRENT MAJOR DEPRESSIVE DISORDER WITH PSYCHOTIC FEATURES (HCC): Primary | ICD-10-CM

## 2018-12-27 DIAGNOSIS — F51.01 PRIMARY INSOMNIA: ICD-10-CM

## 2018-12-27 DIAGNOSIS — Z13.220 SCREENING FOR LIPID DISORDERS: ICD-10-CM

## 2018-12-27 LAB
CHOLESTEROL, TOTAL: 173 MG/DL (ref 0–199)
HDLC SERPL-MCNC: 39 MG/DL (ref 40–60)
LDL CHOLESTEROL CALCULATED: 106 MG/DL
TRIGL SERPL-MCNC: 141 MG/DL (ref 0–150)
VLDLC SERPL CALC-MCNC: 28 MG/DL

## 2018-12-27 PROCEDURE — 36415 COLL VENOUS BLD VENIPUNCTURE: CPT | Performed by: FAMILY MEDICINE

## 2018-12-27 PROCEDURE — 4004F PT TOBACCO SCREEN RCVD TLK: CPT | Performed by: FAMILY MEDICINE

## 2018-12-27 PROCEDURE — 99214 OFFICE O/P EST MOD 30 MIN: CPT | Performed by: FAMILY MEDICINE

## 2018-12-27 PROCEDURE — G8420 CALC BMI NORM PARAMETERS: HCPCS | Performed by: FAMILY MEDICINE

## 2018-12-27 PROCEDURE — G8484 FLU IMMUNIZE NO ADMIN: HCPCS | Performed by: FAMILY MEDICINE

## 2018-12-27 PROCEDURE — G8427 DOCREV CUR MEDS BY ELIG CLIN: HCPCS | Performed by: FAMILY MEDICINE

## 2018-12-27 RX ORDER — RISPERIDONE 3 MG/1
3 TABLET, FILM COATED ORAL NIGHTLY
Qty: 30 TABLET | Refills: 5 | Status: ON HOLD | OUTPATIENT
Start: 2018-12-27 | End: 2019-01-19 | Stop reason: HOSPADM

## 2018-12-27 RX ORDER — GABAPENTIN 600 MG/1
1200 TABLET ORAL 3 TIMES DAILY
Qty: 540 TABLET | Refills: 1 | Status: ON HOLD | OUTPATIENT
Start: 2018-12-27 | End: 2019-01-19

## 2018-12-27 RX ORDER — ALBUTEROL SULFATE 90 UG/1
2 AEROSOL, METERED RESPIRATORY (INHALATION) EVERY 6 HOURS PRN
Qty: 1 INHALER | Refills: 11 | Status: SHIPPED | OUTPATIENT
Start: 2018-12-27 | End: 2019-06-20 | Stop reason: SDUPTHER

## 2018-12-27 RX ORDER — FLUOXETINE HYDROCHLORIDE 40 MG/1
40 CAPSULE ORAL DAILY
Qty: 30 CAPSULE | Refills: 5 | Status: SHIPPED | OUTPATIENT
Start: 2018-12-27

## 2018-12-27 ASSESSMENT — ENCOUNTER SYMPTOMS: BACK PAIN: 1

## 2018-12-27 NOTE — PROGRESS NOTES
the lungs every 6 hours as needed for Wheezing 1 Inhaler 11    vitamin D (CHOLECALCIFEROL) 1000 UNIT TABS tablet Take 1,000 Units by mouth daily      Garlic 10 MG CAPS Take by mouth      albuterol (PROVENTIL) (2.5 MG/3ML) 0.083% nebulizer solution Take 3 mLs by nebulization every 6 hours as needed for Wheezing J45.909 120 each 3    albuterol (PROVENTIL) (5 MG/ML) 0.5% nebulizer solution Take 1 mL by nebulization 4 times daily as needed for Wheezing 120 each 11    buprenorphine (SUBUTEX) 2 MG SUBL SL tablet Place 8 mg under the tongue 2 times daily. Cheryle Synagogue Nebulizer MISC by Does not apply route daily      topiramate (TOPAMAX) 50 MG tablet Take 50 mg by mouth 2 times daily      donepezil (ARICEPT) 5 MG tablet Take 1 tablet by mouth nightly (Patient taking differently: Take 10 mg by mouth nightly ) 12 tablet 0    aspirin 325 MG tablet Take 325 mg by mouth daily. No current facility-administered medications for this visit. Assessment:    1. Severe recurrent major depressive disorder with psychotic features ([address]    2. Primary insomnia    3. Chronic bilateral thoracic back pain    4. Screening for lipid disorders    5. Cigarette nicotine dependence without complication        Plan:    1. Severe recurrent major depressive disorder with psychotic features ([address]  Stable. Continue current medications.   - FLUoxetine (PROZAC) 40 MG capsule; Take 1 capsule by mouth daily  Dispense: 30 capsule; Refill: 5  - risperiDONE (RISPERDAL) 3 MG tablet; Take 1 tablet by mouth nightly  Dispense: 30 tablet; Refill: 5    2. Primary insomnia  Get sleep study completed. 3. Chronic bilateral thoracic back pain  Stable. Continue current medications. - gabapentin (NEURONTIN) 600 MG tablet; Take 2 tablets by mouth 3 times daily for 180 days. .  Dispense: 540 tablet; Refill: 1    4. Screening for lipid disorders  - Lipid Panel    5. Cigarette nicotine dependence without complication  Encouraged tobacco cessation.  He says

## 2019-01-03 DIAGNOSIS — M79.601 RIGHT ARM PAIN: Primary | ICD-10-CM

## 2019-01-16 ENCOUNTER — APPOINTMENT (OUTPATIENT)
Dept: GENERAL RADIOLOGY | Age: 44
DRG: 562 | End: 2019-01-16
Payer: MEDICARE

## 2019-01-16 ENCOUNTER — HOSPITAL ENCOUNTER (INPATIENT)
Age: 44
LOS: 3 days | Discharge: HOME | DRG: 562 | End: 2019-01-19
Attending: EMERGENCY MEDICINE | Admitting: INTERNAL MEDICINE
Payer: MEDICARE

## 2019-01-16 DIAGNOSIS — G89.29 CHRONIC BILATERAL THORACIC BACK PAIN: ICD-10-CM

## 2019-01-16 DIAGNOSIS — M54.6 CHRONIC BILATERAL THORACIC BACK PAIN: ICD-10-CM

## 2019-01-16 DIAGNOSIS — S42.494A OTHER CLOSED NONDISPLACED FRACTURE OF DISTAL END OF RIGHT HUMERUS, INITIAL ENCOUNTER: Primary | ICD-10-CM

## 2019-01-16 PROBLEM — S42.401A: Status: ACTIVE | Noted: 2019-01-16

## 2019-01-16 LAB
A/G RATIO: 1.4 (ref 1.1–2.2)
ALBUMIN SERPL-MCNC: 3.7 G/DL (ref 3.4–5)
ALP BLD-CCNC: 136 U/L (ref 40–129)
ALT SERPL-CCNC: 9 U/L (ref 10–40)
ANION GAP SERPL CALCULATED.3IONS-SCNC: 10 MMOL/L (ref 3–16)
AST SERPL-CCNC: 19 U/L (ref 15–37)
BASOPHILS ABSOLUTE: 0.1 K/UL (ref 0–0.2)
BASOPHILS RELATIVE PERCENT: 0.5 %
BILIRUB SERPL-MCNC: 0.3 MG/DL (ref 0–1)
BUN BLDV-MCNC: 5 MG/DL (ref 7–20)
CALCIUM SERPL-MCNC: 8.9 MG/DL (ref 8.3–10.6)
CHLORIDE BLD-SCNC: 97 MMOL/L (ref 99–110)
CO2: 29 MMOL/L (ref 21–32)
CREAT SERPL-MCNC: 0.8 MG/DL (ref 0.9–1.3)
EKG ATRIAL RATE: 77 BPM
EKG DIAGNOSIS: NORMAL
EKG P AXIS: 74 DEGREES
EKG P-R INTERVAL: 126 MS
EKG Q-T INTERVAL: 376 MS
EKG QRS DURATION: 82 MS
EKG QTC CALCULATION (BAZETT): 425 MS
EKG R AXIS: 82 DEGREES
EKG T AXIS: 75 DEGREES
EKG VENTRICULAR RATE: 77 BPM
EOSINOPHILS ABSOLUTE: 0 K/UL (ref 0–0.6)
EOSINOPHILS RELATIVE PERCENT: 0.2 %
GFR AFRICAN AMERICAN: >60
GFR NON-AFRICAN AMERICAN: >60
GLOBULIN: 2.6 G/DL
GLUCOSE BLD-MCNC: 116 MG/DL (ref 70–99)
HCT VFR BLD CALC: 43.4 % (ref 40.5–52.5)
HEMOGLOBIN: 14.6 G/DL (ref 13.5–17.5)
INR BLD: 1.05 (ref 0.86–1.14)
LYMPHOCYTES ABSOLUTE: 1 K/UL (ref 1–5.1)
LYMPHOCYTES RELATIVE PERCENT: 8.3 %
MCH RBC QN AUTO: 32 PG (ref 26–34)
MCHC RBC AUTO-ENTMCNC: 33.6 G/DL (ref 31–36)
MCV RBC AUTO: 95.3 FL (ref 80–100)
MONOCYTES ABSOLUTE: 1.2 K/UL (ref 0–1.3)
MONOCYTES RELATIVE PERCENT: 10.3 %
NEUTROPHILS ABSOLUTE: 9.6 K/UL (ref 1.7–7.7)
NEUTROPHILS RELATIVE PERCENT: 80.7 %
PDW BLD-RTO: 13.7 % (ref 12.4–15.4)
PLATELET # BLD: 223 K/UL (ref 135–450)
PMV BLD AUTO: 9.1 FL (ref 5–10.5)
POTASSIUM SERPL-SCNC: 3.8 MMOL/L (ref 3.5–5.1)
PROTHROMBIN TIME: 12 SEC (ref 9.8–13)
RBC # BLD: 4.55 M/UL (ref 4.2–5.9)
SODIUM BLD-SCNC: 136 MMOL/L (ref 136–145)
TOTAL PROTEIN: 6.3 G/DL (ref 6.4–8.2)
WBC # BLD: 11.9 K/UL (ref 4–11)

## 2019-01-16 PROCEDURE — 4500000025 HC ED LEVEL 5 PROCEDURE

## 2019-01-16 PROCEDURE — 2580000003 HC RX 258: Performed by: PHYSICIAN ASSISTANT

## 2019-01-16 PROCEDURE — 85025 COMPLETE CBC W/AUTO DIFF WBC: CPT

## 2019-01-16 PROCEDURE — 93005 ELECTROCARDIOGRAM TRACING: CPT | Performed by: EMERGENCY MEDICINE

## 2019-01-16 PROCEDURE — 96375 TX/PRO/DX INJ NEW DRUG ADDON: CPT

## 2019-01-16 PROCEDURE — 73060 X-RAY EXAM OF HUMERUS: CPT

## 2019-01-16 PROCEDURE — 0PSFXZZ REPOSITION RIGHT HUMERAL SHAFT, EXTERNAL APPROACH: ICD-10-PCS | Performed by: EMERGENCY MEDICINE

## 2019-01-16 PROCEDURE — 94761 N-INVAS EAR/PLS OXIMETRY MLT: CPT

## 2019-01-16 PROCEDURE — 99285 EMERGENCY DEPT VISIT HI MDM: CPT

## 2019-01-16 PROCEDURE — 80053 COMPREHEN METABOLIC PANEL: CPT

## 2019-01-16 PROCEDURE — 93010 ELECTROCARDIOGRAM REPORT: CPT | Performed by: INTERNAL MEDICINE

## 2019-01-16 PROCEDURE — 85610 PROTHROMBIN TIME: CPT

## 2019-01-16 PROCEDURE — 6360000002 HC RX W HCPCS: Performed by: PHYSICIAN ASSISTANT

## 2019-01-16 PROCEDURE — 96374 THER/PROPH/DIAG INJ IV PUSH: CPT

## 2019-01-16 PROCEDURE — 1200000000 HC SEMI PRIVATE

## 2019-01-16 PROCEDURE — 2700000000 HC OXYGEN THERAPY PER DAY

## 2019-01-16 PROCEDURE — 6360000002 HC RX W HCPCS: Performed by: EMERGENCY MEDICINE

## 2019-01-16 PROCEDURE — 6370000000 HC RX 637 (ALT 250 FOR IP): Performed by: PHYSICIAN ASSISTANT

## 2019-01-16 RX ORDER — MORPHINE SULFATE 4 MG/ML
4 INJECTION, SOLUTION INTRAMUSCULAR; INTRAVENOUS EVERY 4 HOURS PRN
Status: DISCONTINUED | OUTPATIENT
Start: 2019-01-16 | End: 2019-01-17

## 2019-01-16 RX ORDER — TOPIRAMATE 25 MG/1
50 TABLET ORAL DAILY
Status: DISCONTINUED | OUTPATIENT
Start: 2019-01-16 | End: 2019-01-19 | Stop reason: HOSPADM

## 2019-01-16 RX ORDER — ONDANSETRON 2 MG/ML
4 INJECTION INTRAMUSCULAR; INTRAVENOUS ONCE
Status: COMPLETED | OUTPATIENT
Start: 2019-01-16 | End: 2019-01-16

## 2019-01-16 RX ORDER — ACETAMINOPHEN 325 MG/1
650 TABLET ORAL EVERY 4 HOURS PRN
Status: DISCONTINUED | OUTPATIENT
Start: 2019-01-16 | End: 2019-01-19 | Stop reason: HOSPADM

## 2019-01-16 RX ORDER — ONDANSETRON 2 MG/ML
4 INJECTION INTRAMUSCULAR; INTRAVENOUS EVERY 6 HOURS PRN
Status: DISCONTINUED | OUTPATIENT
Start: 2019-01-16 | End: 2019-01-19 | Stop reason: HOSPADM

## 2019-01-16 RX ORDER — SODIUM CHLORIDE 9 MG/ML
INJECTION, SOLUTION INTRAVENOUS CONTINUOUS
Status: DISCONTINUED | OUTPATIENT
Start: 2019-01-16 | End: 2019-01-19

## 2019-01-16 RX ORDER — MORPHINE SULFATE 4 MG/ML
4 INJECTION, SOLUTION INTRAMUSCULAR; INTRAVENOUS ONCE
Status: COMPLETED | OUTPATIENT
Start: 2019-01-16 | End: 2019-01-16

## 2019-01-16 RX ORDER — FLUOXETINE HYDROCHLORIDE 20 MG/1
40 CAPSULE ORAL DAILY
Status: DISCONTINUED | OUTPATIENT
Start: 2019-01-16 | End: 2019-01-19 | Stop reason: HOSPADM

## 2019-01-16 RX ORDER — DONEPEZIL HYDROCHLORIDE 5 MG/1
10 TABLET, FILM COATED ORAL NIGHTLY
Status: DISCONTINUED | OUTPATIENT
Start: 2019-01-16 | End: 2019-01-19 | Stop reason: HOSPADM

## 2019-01-16 RX ORDER — SODIUM CHLORIDE 0.9 % (FLUSH) 0.9 %
10 SYRINGE (ML) INJECTION EVERY 12 HOURS SCHEDULED
Status: DISCONTINUED | OUTPATIENT
Start: 2019-01-16 | End: 2019-01-19 | Stop reason: HOSPADM

## 2019-01-16 RX ORDER — SODIUM CHLORIDE 0.9 % (FLUSH) 0.9 %
10 SYRINGE (ML) INJECTION PRN
Status: DISCONTINUED | OUTPATIENT
Start: 2019-01-16 | End: 2019-01-19 | Stop reason: HOSPADM

## 2019-01-16 RX ORDER — POTASSIUM CHLORIDE 20 MEQ/1
40 TABLET, EXTENDED RELEASE ORAL PRN
Status: DISCONTINUED | OUTPATIENT
Start: 2019-01-16 | End: 2019-01-19 | Stop reason: HOSPADM

## 2019-01-16 RX ORDER — GABAPENTIN 400 MG/1
1200 CAPSULE ORAL 3 TIMES DAILY
Status: DISCONTINUED | OUTPATIENT
Start: 2019-01-16 | End: 2019-01-17

## 2019-01-16 RX ORDER — POTASSIUM CHLORIDE 7.45 MG/ML
10 INJECTION INTRAVENOUS PRN
Status: DISCONTINUED | OUTPATIENT
Start: 2019-01-16 | End: 2019-01-19 | Stop reason: HOSPADM

## 2019-01-16 RX ORDER — OXYCODONE HYDROCHLORIDE 5 MG/1
5 TABLET ORAL EVERY 4 HOURS PRN
Status: DISCONTINUED | OUTPATIENT
Start: 2019-01-16 | End: 2019-01-17

## 2019-01-16 RX ORDER — MAGNESIUM SULFATE 1 G/100ML
1 INJECTION INTRAVENOUS PRN
Status: DISCONTINUED | OUTPATIENT
Start: 2019-01-16 | End: 2019-01-19 | Stop reason: HOSPADM

## 2019-01-16 RX ORDER — POTASSIUM CHLORIDE 20MEQ/15ML
40 LIQUID (ML) ORAL PRN
Status: DISCONTINUED | OUTPATIENT
Start: 2019-01-16 | End: 2019-01-19 | Stop reason: HOSPADM

## 2019-01-16 RX ADMIN — MORPHINE SULFATE 4 MG: 4 INJECTION INTRAVENOUS at 18:44

## 2019-01-16 RX ADMIN — RISPERIDONE 3 MG: 2 TABLET ORAL at 21:36

## 2019-01-16 RX ADMIN — DONEPEZIL HYDROCHLORIDE 10 MG: 5 TABLET, FILM COATED ORAL at 21:37

## 2019-01-16 RX ADMIN — SODIUM CHLORIDE: 9 INJECTION, SOLUTION INTRAVENOUS at 21:36

## 2019-01-16 RX ADMIN — OXYCODONE HYDROCHLORIDE 5 MG: 5 TABLET ORAL at 21:51

## 2019-01-16 RX ADMIN — ONDANSETRON 4 MG: 2 INJECTION INTRAMUSCULAR; INTRAVENOUS at 11:25

## 2019-01-16 RX ADMIN — TOPIRAMATE 50 MG: 25 TABLET, FILM COATED ORAL at 21:37

## 2019-01-16 RX ADMIN — ENOXAPARIN SODIUM 40 MG: 40 INJECTION SUBCUTANEOUS at 18:37

## 2019-01-16 RX ADMIN — SODIUM CHLORIDE: 9 INJECTION, SOLUTION INTRAVENOUS at 18:38

## 2019-01-16 RX ADMIN — Medication 10 ML: at 21:37

## 2019-01-16 RX ADMIN — GABAPENTIN 1200 MG: 400 CAPSULE ORAL at 21:36

## 2019-01-16 RX ADMIN — MORPHINE SULFATE 4 MG: 4 INJECTION INTRAVENOUS at 11:25

## 2019-01-16 ASSESSMENT — PAIN DESCRIPTION - DESCRIPTORS: DESCRIPTORS: BURNING;THROBBING

## 2019-01-16 ASSESSMENT — PAIN SCALES - GENERAL
PAINLEVEL_OUTOF10: 7
PAINLEVEL_OUTOF10: 5
PAINLEVEL_OUTOF10: 5
PAINLEVEL_OUTOF10: 0
PAINLEVEL_OUTOF10: 5

## 2019-01-16 ASSESSMENT — PAIN DESCRIPTION - LOCATION: LOCATION: ARM

## 2019-01-16 ASSESSMENT — PAIN DESCRIPTION - PAIN TYPE: TYPE: ACUTE PAIN

## 2019-01-17 ENCOUNTER — APPOINTMENT (OUTPATIENT)
Dept: GENERAL RADIOLOGY | Age: 44
DRG: 562 | End: 2019-01-17
Payer: MEDICARE

## 2019-01-17 ENCOUNTER — APPOINTMENT (OUTPATIENT)
Dept: CT IMAGING | Age: 44
DRG: 562 | End: 2019-01-17
Payer: MEDICARE

## 2019-01-17 ENCOUNTER — TELEPHONE (OUTPATIENT)
Dept: PULMONOLOGY | Age: 44
End: 2019-01-17

## 2019-01-17 PROBLEM — J96.01 ACUTE RESPIRATORY FAILURE WITH HYPOXIA (HCC): Status: ACTIVE | Noted: 2019-01-17

## 2019-01-17 PROBLEM — G92.9 TOXIC ENCEPHALOPATHY: Status: ACTIVE | Noted: 2019-01-17

## 2019-01-17 LAB
AMPHETAMINE SCREEN, URINE: ABNORMAL
ANION GAP SERPL CALCULATED.3IONS-SCNC: 9 MMOL/L (ref 3–16)
BANDED NEUTROPHILS RELATIVE PERCENT: 1 % (ref 0–7)
BARBITURATE SCREEN URINE: ABNORMAL
BASOPHILS ABSOLUTE: 0 K/UL (ref 0–0.2)
BASOPHILS RELATIVE PERCENT: 0 %
BENZODIAZEPINE SCREEN, URINE: ABNORMAL
BUN BLDV-MCNC: 12 MG/DL (ref 7–20)
CALCIUM SERPL-MCNC: 8.9 MG/DL (ref 8.3–10.6)
CANNABINOID SCREEN URINE: ABNORMAL
CHLORIDE BLD-SCNC: 99 MMOL/L (ref 99–110)
CO2: 28 MMOL/L (ref 21–32)
COCAINE METABOLITE SCREEN URINE: ABNORMAL
CREAT SERPL-MCNC: 1.1 MG/DL (ref 0.9–1.3)
EOSINOPHILS ABSOLUTE: 0.1 K/UL (ref 0–0.6)
EOSINOPHILS RELATIVE PERCENT: 1 %
GFR AFRICAN AMERICAN: >60
GFR NON-AFRICAN AMERICAN: >60
GLUCOSE BLD-MCNC: 114 MG/DL (ref 70–99)
HCT VFR BLD CALC: 42.4 % (ref 40.5–52.5)
HEMOGLOBIN: 14 G/DL (ref 13.5–17.5)
LYMPHOCYTES ABSOLUTE: 3 K/UL (ref 1–5.1)
LYMPHOCYTES RELATIVE PERCENT: 33 %
Lab: ABNORMAL
MCH RBC QN AUTO: 31.8 PG (ref 26–34)
MCHC RBC AUTO-ENTMCNC: 33 G/DL (ref 31–36)
MCV RBC AUTO: 96.4 FL (ref 80–100)
METHADONE SCREEN, URINE: ABNORMAL
MONOCYTES ABSOLUTE: 0.4 K/UL (ref 0–1.3)
MONOCYTES RELATIVE PERCENT: 4 %
NEUTROPHILS ABSOLUTE: 5.6 K/UL (ref 1.7–7.7)
NEUTROPHILS RELATIVE PERCENT: 61 %
OPIATE SCREEN URINE: POSITIVE
OXYCODONE URINE: POSITIVE
PDW BLD-RTO: 13.8 % (ref 12.4–15.4)
PH UA: 6.5
PHENCYCLIDINE SCREEN URINE: ABNORMAL
PLATELET # BLD: 181 K/UL (ref 135–450)
PLATELET SLIDE REVIEW: ADEQUATE
PMV BLD AUTO: 9 FL (ref 5–10.5)
POTASSIUM REFLEX MAGNESIUM: 3.9 MMOL/L (ref 3.5–5.1)
PROPOXYPHENE SCREEN: ABNORMAL
RBC # BLD: 4.4 M/UL (ref 4.2–5.9)
SLIDE REVIEW: NORMAL
SODIUM BLD-SCNC: 136 MMOL/L (ref 136–145)
WBC # BLD: 9.1 K/UL (ref 4–11)

## 2019-01-17 PROCEDURE — 97530 THERAPEUTIC ACTIVITIES: CPT

## 2019-01-17 PROCEDURE — 80048 BASIC METABOLIC PNL TOTAL CA: CPT

## 2019-01-17 PROCEDURE — 6360000002 HC RX W HCPCS: Performed by: PHYSICIAN ASSISTANT

## 2019-01-17 PROCEDURE — 85025 COMPLETE CBC W/AUTO DIFF WBC: CPT

## 2019-01-17 PROCEDURE — 97535 SELF CARE MNGMENT TRAINING: CPT

## 2019-01-17 PROCEDURE — 94762 N-INVAS EAR/PLS OXIMTRY CONT: CPT

## 2019-01-17 PROCEDURE — 1200000000 HC SEMI PRIVATE

## 2019-01-17 PROCEDURE — 2700000000 HC OXYGEN THERAPY PER DAY

## 2019-01-17 PROCEDURE — 97161 PT EVAL LOW COMPLEX 20 MIN: CPT

## 2019-01-17 PROCEDURE — 71045 X-RAY EXAM CHEST 1 VIEW: CPT

## 2019-01-17 PROCEDURE — 2580000003 HC RX 258: Performed by: PHYSICIAN ASSISTANT

## 2019-01-17 PROCEDURE — APPNB30 APP NON BILLABLE TIME 0-30 MINS: Performed by: PHYSICIAN ASSISTANT

## 2019-01-17 PROCEDURE — 80307 DRUG TEST PRSMV CHEM ANLYZR: CPT

## 2019-01-17 PROCEDURE — 6370000000 HC RX 637 (ALT 250 FOR IP): Performed by: PHYSICIAN ASSISTANT

## 2019-01-17 PROCEDURE — 99232 SBSQ HOSP IP/OBS MODERATE 35: CPT | Performed by: INTERNAL MEDICINE

## 2019-01-17 PROCEDURE — 97166 OT EVAL MOD COMPLEX 45 MIN: CPT

## 2019-01-17 PROCEDURE — 6360000002 HC RX W HCPCS

## 2019-01-17 PROCEDURE — 73060 X-RAY EXAM OF HUMERUS: CPT

## 2019-01-17 PROCEDURE — 36415 COLL VENOUS BLD VENIPUNCTURE: CPT

## 2019-01-17 RX ORDER — NALOXONE HYDROCHLORIDE 1 MG/ML
0.4 INJECTION INTRAMUSCULAR; INTRAVENOUS; SUBCUTANEOUS ONCE
Status: COMPLETED | OUTPATIENT
Start: 2019-01-17 | End: 2019-01-17

## 2019-01-17 RX ORDER — NALOXONE HYDROCHLORIDE 0.4 MG/ML
INJECTION, SOLUTION INTRAMUSCULAR; INTRAVENOUS; SUBCUTANEOUS
Status: COMPLETED
Start: 2019-01-17 | End: 2019-01-17

## 2019-01-17 RX ORDER — NALOXONE HYDROCHLORIDE 0.4 MG/ML
0.4 INJECTION, SOLUTION INTRAMUSCULAR; INTRAVENOUS; SUBCUTANEOUS ONCE
Status: COMPLETED | OUTPATIENT
Start: 2019-01-17 | End: 2019-01-17

## 2019-01-17 RX ORDER — GABAPENTIN 300 MG/1
600 CAPSULE ORAL 3 TIMES DAILY
Status: DISCONTINUED | OUTPATIENT
Start: 2019-01-18 | End: 2019-01-19 | Stop reason: HOSPADM

## 2019-01-17 RX ADMIN — SODIUM CHLORIDE: 9 INJECTION, SOLUTION INTRAVENOUS at 22:51

## 2019-01-17 RX ADMIN — NALOXONE HYDROCHLORIDE 0.4 MG: 0.4 INJECTION, SOLUTION INTRAMUSCULAR; INTRAVENOUS; SUBCUTANEOUS at 11:08

## 2019-01-17 RX ADMIN — NALOXONE HYDROCHLORIDE 0.4 MG: 0.4 INJECTION, SOLUTION INTRAMUSCULAR; INTRAVENOUS; SUBCUTANEOUS at 11:21

## 2019-01-17 RX ADMIN — GABAPENTIN 1200 MG: 400 CAPSULE ORAL at 10:39

## 2019-01-17 RX ADMIN — FLUOXETINE 40 MG: 20 CAPSULE ORAL at 10:40

## 2019-01-17 RX ADMIN — NALOXONE HYDROCHLORIDE 0.4 MG: 1 INJECTION PARENTERAL at 11:25

## 2019-01-17 RX ADMIN — Medication 10 ML: at 10:40

## 2019-01-17 RX ADMIN — SODIUM CHLORIDE: 9 INJECTION, SOLUTION INTRAVENOUS at 16:06

## 2019-01-17 RX ADMIN — TOPIRAMATE 50 MG: 25 TABLET, FILM COATED ORAL at 10:40

## 2019-01-17 RX ADMIN — ENOXAPARIN SODIUM 40 MG: 40 INJECTION SUBCUTANEOUS at 10:39

## 2019-01-17 RX ADMIN — DONEPEZIL HYDROCHLORIDE 10 MG: 5 TABLET, FILM COATED ORAL at 20:45

## 2019-01-18 LAB
ANION GAP SERPL CALCULATED.3IONS-SCNC: 8 MMOL/L (ref 3–16)
BASOPHILS ABSOLUTE: 0 K/UL (ref 0–0.2)
BASOPHILS RELATIVE PERCENT: 0 %
BUN BLDV-MCNC: 11 MG/DL (ref 7–20)
CALCIUM SERPL-MCNC: 8.6 MG/DL (ref 8.3–10.6)
CHLORIDE BLD-SCNC: 102 MMOL/L (ref 99–110)
CO2: 24 MMOL/L (ref 21–32)
CREAT SERPL-MCNC: 0.8 MG/DL (ref 0.9–1.3)
EOSINOPHILS ABSOLUTE: 0.3 K/UL (ref 0–0.6)
EOSINOPHILS RELATIVE PERCENT: 3 %
GFR AFRICAN AMERICAN: >60
GFR NON-AFRICAN AMERICAN: >60
GLUCOSE BLD-MCNC: 91 MG/DL (ref 70–99)
HCT VFR BLD CALC: 40.2 % (ref 40.5–52.5)
HEMOGLOBIN: 13.3 G/DL (ref 13.5–17.5)
LYMPHOCYTES ABSOLUTE: 2.3 K/UL (ref 1–5.1)
LYMPHOCYTES RELATIVE PERCENT: 24 %
MCH RBC QN AUTO: 32.1 PG (ref 26–34)
MCHC RBC AUTO-ENTMCNC: 33 G/DL (ref 31–36)
MCV RBC AUTO: 97.2 FL (ref 80–100)
MONOCYTES ABSOLUTE: 0.6 K/UL (ref 0–1.3)
MONOCYTES RELATIVE PERCENT: 6 %
NEUTROPHILS ABSOLUTE: 6.3 K/UL (ref 1.7–7.7)
NEUTROPHILS RELATIVE PERCENT: 67 %
PDW BLD-RTO: 14.2 % (ref 12.4–15.4)
PLATELET # BLD: 157 K/UL (ref 135–450)
PLATELET SLIDE REVIEW: ADEQUATE
PMV BLD AUTO: 9.2 FL (ref 5–10.5)
POTASSIUM REFLEX MAGNESIUM: 4.2 MMOL/L (ref 3.5–5.1)
RBC # BLD: 4.13 M/UL (ref 4.2–5.9)
SLIDE REVIEW: ABNORMAL
SODIUM BLD-SCNC: 134 MMOL/L (ref 136–145)
WBC # BLD: 9.4 K/UL (ref 4–11)

## 2019-01-18 PROCEDURE — 97530 THERAPEUTIC ACTIVITIES: CPT

## 2019-01-18 PROCEDURE — 1200000000 HC SEMI PRIVATE

## 2019-01-18 PROCEDURE — 2580000003 HC RX 258: Performed by: PHYSICIAN ASSISTANT

## 2019-01-18 PROCEDURE — 80048 BASIC METABOLIC PNL TOTAL CA: CPT

## 2019-01-18 PROCEDURE — 6370000000 HC RX 637 (ALT 250 FOR IP): Performed by: PHYSICIAN ASSISTANT

## 2019-01-18 PROCEDURE — 2700000000 HC OXYGEN THERAPY PER DAY

## 2019-01-18 PROCEDURE — 97110 THERAPEUTIC EXERCISES: CPT

## 2019-01-18 PROCEDURE — 99232 SBSQ HOSP IP/OBS MODERATE 35: CPT | Performed by: INTERNAL MEDICINE

## 2019-01-18 PROCEDURE — 94762 N-INVAS EAR/PLS OXIMTRY CONT: CPT

## 2019-01-18 PROCEDURE — 85025 COMPLETE CBC W/AUTO DIFF WBC: CPT

## 2019-01-18 PROCEDURE — 97535 SELF CARE MNGMENT TRAINING: CPT

## 2019-01-18 PROCEDURE — 6360000002 HC RX W HCPCS: Performed by: PHYSICIAN ASSISTANT

## 2019-01-18 PROCEDURE — 97116 GAIT TRAINING THERAPY: CPT

## 2019-01-18 PROCEDURE — 36415 COLL VENOUS BLD VENIPUNCTURE: CPT

## 2019-01-18 RX ADMIN — DONEPEZIL HYDROCHLORIDE 10 MG: 5 TABLET, FILM COATED ORAL at 21:22

## 2019-01-18 RX ADMIN — SODIUM CHLORIDE: 9 INJECTION, SOLUTION INTRAVENOUS at 06:36

## 2019-01-18 RX ADMIN — ENOXAPARIN SODIUM 40 MG: 40 INJECTION SUBCUTANEOUS at 08:43

## 2019-01-18 RX ADMIN — SODIUM CHLORIDE: 9 INJECTION, SOLUTION INTRAVENOUS at 21:30

## 2019-01-18 RX ADMIN — TOPIRAMATE 50 MG: 25 TABLET, FILM COATED ORAL at 08:43

## 2019-01-18 RX ADMIN — FLUOXETINE 40 MG: 20 CAPSULE ORAL at 08:43

## 2019-01-18 RX ADMIN — GABAPENTIN 600 MG: 300 CAPSULE ORAL at 08:43

## 2019-01-18 RX ADMIN — GABAPENTIN 600 MG: 300 CAPSULE ORAL at 21:22

## 2019-01-18 RX ADMIN — SODIUM CHLORIDE: 9 INJECTION, SOLUTION INTRAVENOUS at 13:17

## 2019-01-18 RX ADMIN — SODIUM CHLORIDE: 9 INJECTION, SOLUTION INTRAVENOUS at 21:22

## 2019-01-18 RX ADMIN — Medication 10 ML: at 08:44

## 2019-01-19 VITALS
OXYGEN SATURATION: 93 % | HEART RATE: 84 BPM | TEMPERATURE: 96.8 F | BODY MASS INDEX: 19.55 KG/M2 | RESPIRATION RATE: 16 BRPM | DIASTOLIC BLOOD PRESSURE: 58 MMHG | SYSTOLIC BLOOD PRESSURE: 96 MMHG | HEIGHT: 69 IN | WEIGHT: 132 LBS

## 2019-01-19 PROCEDURE — 99238 HOSP IP/OBS DSCHRG MGMT 30/<: CPT | Performed by: INTERNAL MEDICINE

## 2019-01-19 PROCEDURE — 97116 GAIT TRAINING THERAPY: CPT

## 2019-01-19 PROCEDURE — 97530 THERAPEUTIC ACTIVITIES: CPT

## 2019-01-19 RX ORDER — GABAPENTIN 600 MG/1
600 TABLET ORAL 2 TIMES DAILY
Qty: 2 TABLET | Refills: 1
Start: 2019-01-19 | End: 2019-10-07 | Stop reason: SDUPTHER

## 2019-01-19 RX ORDER — IPRATROPIUM BROMIDE AND ALBUTEROL SULFATE 2.5; .5 MG/3ML; MG/3ML
1 SOLUTION RESPIRATORY (INHALATION) ONCE
Status: DISCONTINUED | OUTPATIENT
Start: 2019-01-19 | End: 2019-01-19 | Stop reason: HOSPADM

## 2019-01-21 ENCOUNTER — TELEPHONE (OUTPATIENT)
Dept: FAMILY MEDICINE CLINIC | Age: 44
End: 2019-01-21

## 2019-02-05 DIAGNOSIS — M89.8X2 PAIN OF RIGHT HUMERUS: Primary | ICD-10-CM

## 2019-02-09 ENCOUNTER — HOSPITAL ENCOUNTER (OUTPATIENT)
Dept: CT IMAGING | Age: 44
Discharge: HOME | End: 2019-02-09
Payer: MEDICARE

## 2019-02-09 DIAGNOSIS — M89.8X2 PAIN OF RIGHT HUMERUS: ICD-10-CM

## 2019-02-09 DIAGNOSIS — S42.201A CLOSED FRACTURE OF PROXIMAL END OF RIGHT HUMERUS, UNSPECIFIED FRACTURE MORPHOLOGY, INITIAL ENCOUNTER: ICD-10-CM

## 2019-02-09 PROCEDURE — 73200 CT UPPER EXTREMITY W/O DYE: CPT

## 2019-02-13 ENCOUNTER — TELEPHONE (OUTPATIENT)
Dept: FAMILY MEDICINE CLINIC | Age: 44
End: 2019-02-13

## 2019-02-13 DIAGNOSIS — S42.202D CLOSED FRACTURE OF PROXIMAL END OF LEFT HUMERUS WITH ROUTINE HEALING, UNSPECIFIED FRACTURE MORPHOLOGY, SUBSEQUENT ENCOUNTER: Primary | ICD-10-CM

## 2019-02-26 ENCOUNTER — OFFICE VISIT (OUTPATIENT)
Dept: ORTHOPEDIC SURGERY | Age: 44
End: 2019-02-26
Payer: MEDICARE

## 2019-02-26 VITALS
BODY MASS INDEX: 19.56 KG/M2 | SYSTOLIC BLOOD PRESSURE: 128 MMHG | DIASTOLIC BLOOD PRESSURE: 81 MMHG | HEART RATE: 82 BPM | HEIGHT: 69 IN | WEIGHT: 132.06 LBS

## 2019-02-26 DIAGNOSIS — S42.351K CLOSED DISPLACED COMMINUTED FRACTURE OF SHAFT OF RIGHT HUMERUS WITH NONUNION: Primary | ICD-10-CM

## 2019-02-26 PROCEDURE — G8484 FLU IMMUNIZE NO ADMIN: HCPCS | Performed by: ORTHOPAEDIC SURGERY

## 2019-02-26 PROCEDURE — 99203 OFFICE O/P NEW LOW 30 MIN: CPT | Performed by: ORTHOPAEDIC SURGERY

## 2019-02-26 PROCEDURE — G8420 CALC BMI NORM PARAMETERS: HCPCS | Performed by: ORTHOPAEDIC SURGERY

## 2019-02-26 PROCEDURE — G8427 DOCREV CUR MEDS BY ELIG CLIN: HCPCS | Performed by: ORTHOPAEDIC SURGERY

## 2019-03-11 ENCOUNTER — TELEPHONE (OUTPATIENT)
Dept: ORTHOPEDIC SURGERY | Age: 44
End: 2019-03-11

## 2019-03-14 ENCOUNTER — TELEPHONE (OUTPATIENT)
Dept: ORTHOPEDIC SURGERY | Age: 44
End: 2019-03-14

## 2019-03-21 ENCOUNTER — TELEPHONE (OUTPATIENT)
Dept: ORTHOPEDIC SURGERY | Age: 44
End: 2019-03-21

## 2019-03-21 ENCOUNTER — OFFICE VISIT (OUTPATIENT)
Dept: FAMILY MEDICINE CLINIC | Age: 44
End: 2019-03-21
Payer: MEDICARE

## 2019-03-21 VITALS
BODY MASS INDEX: 18.3 KG/M2 | HEART RATE: 80 BPM | WEIGHT: 124 LBS | SYSTOLIC BLOOD PRESSURE: 120 MMHG | DIASTOLIC BLOOD PRESSURE: 82 MMHG | OXYGEN SATURATION: 98 %

## 2019-03-21 DIAGNOSIS — Z01.818 PRE-OP EXAMINATION: Primary | ICD-10-CM

## 2019-03-21 DIAGNOSIS — Z80.0 FAMILY HISTORY OF COLON CANCER: ICD-10-CM

## 2019-03-21 LAB
A/G RATIO: 1.6 (ref 1.1–2.2)
ALBUMIN SERPL-MCNC: 4.7 G/DL (ref 3.4–5)
ALP BLD-CCNC: 186 U/L (ref 40–129)
ALT SERPL-CCNC: <5 U/L (ref 10–40)
ANION GAP SERPL CALCULATED.3IONS-SCNC: 14 MMOL/L (ref 3–16)
AST SERPL-CCNC: 13 U/L (ref 15–37)
BASOPHILS ABSOLUTE: 0.1 K/UL (ref 0–0.2)
BASOPHILS RELATIVE PERCENT: 1.1 %
BILIRUB SERPL-MCNC: 0.4 MG/DL (ref 0–1)
BUN BLDV-MCNC: 6 MG/DL (ref 7–20)
CALCIUM SERPL-MCNC: 10.1 MG/DL (ref 8.3–10.6)
CHLORIDE BLD-SCNC: 98 MMOL/L (ref 99–110)
CO2: 28 MMOL/L (ref 21–32)
CREAT SERPL-MCNC: 0.8 MG/DL (ref 0.9–1.3)
EOSINOPHILS ABSOLUTE: 0.1 K/UL (ref 0–0.6)
EOSINOPHILS RELATIVE PERCENT: 0.8 %
GFR AFRICAN AMERICAN: >60
GFR NON-AFRICAN AMERICAN: >60
GLOBULIN: 2.9 G/DL
GLUCOSE BLD-MCNC: 95 MG/DL (ref 70–99)
HCT VFR BLD CALC: 53.7 % (ref 40.5–52.5)
HEMOGLOBIN: 17.7 G/DL (ref 13.5–17.5)
LYMPHOCYTES ABSOLUTE: 1.1 K/UL (ref 1–5.1)
LYMPHOCYTES RELATIVE PERCENT: 14.2 %
MCH RBC QN AUTO: 31 PG (ref 26–34)
MCHC RBC AUTO-ENTMCNC: 33 G/DL (ref 31–36)
MCV RBC AUTO: 94 FL (ref 80–100)
MONOCYTES ABSOLUTE: 0.8 K/UL (ref 0–1.3)
MONOCYTES RELATIVE PERCENT: 9.9 %
NEUTROPHILS ABSOLUTE: 5.8 K/UL (ref 1.7–7.7)
NEUTROPHILS RELATIVE PERCENT: 74 %
PDW BLD-RTO: 14.4 % (ref 12.4–15.4)
PLATELET # BLD: 229 K/UL (ref 135–450)
PMV BLD AUTO: 9.8 FL (ref 5–10.5)
POTASSIUM SERPL-SCNC: 4.4 MMOL/L (ref 3.5–5.1)
RBC # BLD: 5.71 M/UL (ref 4.2–5.9)
SODIUM BLD-SCNC: 140 MMOL/L (ref 136–145)
TOTAL PROTEIN: 7.6 G/DL (ref 6.4–8.2)
WBC # BLD: 7.9 K/UL (ref 4–11)

## 2019-03-21 PROCEDURE — 93000 ELECTROCARDIOGRAM COMPLETE: CPT | Performed by: FAMILY MEDICINE

## 2019-03-21 PROCEDURE — G8484 FLU IMMUNIZE NO ADMIN: HCPCS | Performed by: FAMILY MEDICINE

## 2019-03-21 PROCEDURE — 99214 OFFICE O/P EST MOD 30 MIN: CPT | Performed by: FAMILY MEDICINE

## 2019-03-21 PROCEDURE — 4004F PT TOBACCO SCREEN RCVD TLK: CPT | Performed by: FAMILY MEDICINE

## 2019-03-21 PROCEDURE — G8419 CALC BMI OUT NRM PARAM NOF/U: HCPCS | Performed by: FAMILY MEDICINE

## 2019-03-21 PROCEDURE — G8427 DOCREV CUR MEDS BY ELIG CLIN: HCPCS | Performed by: FAMILY MEDICINE

## 2019-03-21 PROCEDURE — 36415 COLL VENOUS BLD VENIPUNCTURE: CPT | Performed by: FAMILY MEDICINE

## 2019-03-29 ENCOUNTER — ANESTHESIA EVENT (OUTPATIENT)
Dept: OPERATING ROOM | Age: 44
End: 2019-03-29
Payer: MEDICARE

## 2019-04-01 ENCOUNTER — HOSPITAL ENCOUNTER (OUTPATIENT)
Age: 44
Setting detail: OUTPATIENT SURGERY
Discharge: HOME | End: 2019-04-01
Attending: ORTHOPAEDIC SURGERY | Admitting: ORTHOPAEDIC SURGERY
Payer: MEDICARE

## 2019-04-01 ENCOUNTER — ANESTHESIA (OUTPATIENT)
Dept: OPERATING ROOM | Age: 44
End: 2019-04-01
Payer: MEDICARE

## 2019-04-01 ENCOUNTER — HOSPITAL ENCOUNTER (OUTPATIENT)
Dept: GENERAL RADIOLOGY | Age: 44
Discharge: HOME | End: 2019-04-01
Attending: ORTHOPAEDIC SURGERY
Payer: MEDICARE

## 2019-04-01 VITALS
HEART RATE: 86 BPM | RESPIRATION RATE: 14 BRPM | TEMPERATURE: 97 F | HEIGHT: 69 IN | WEIGHT: 125 LBS | OXYGEN SATURATION: 96 % | DIASTOLIC BLOOD PRESSURE: 64 MMHG | BODY MASS INDEX: 18.51 KG/M2 | SYSTOLIC BLOOD PRESSURE: 93 MMHG

## 2019-04-01 VITALS — OXYGEN SATURATION: 100 % | DIASTOLIC BLOOD PRESSURE: 53 MMHG | SYSTOLIC BLOOD PRESSURE: 102 MMHG

## 2019-04-01 DIAGNOSIS — S42.254K CLOSED NONDISPLACED FRACTURE OF GREATER TUBEROSITY OF RIGHT HUMERUS WITH NONUNION, SUBSEQUENT ENCOUNTER: ICD-10-CM

## 2019-04-01 DIAGNOSIS — S42.351K CLOSED DISPLACED COMMINUTED FRACTURE OF SHAFT OF RIGHT HUMERUS WITH NONUNION: ICD-10-CM

## 2019-04-01 DIAGNOSIS — S42.401A DISPLACED FRACTURE OF DISTAL END OF RIGHT HUMERUS: Primary | ICD-10-CM

## 2019-04-01 PROCEDURE — 87102 FUNGUS ISOLATION CULTURE: CPT

## 2019-04-01 PROCEDURE — 3700000000 HC ANESTHESIA ATTENDED CARE: Performed by: ORTHOPAEDIC SURGERY

## 2019-04-01 PROCEDURE — 2720000010 HC SURG SUPPLY STERILE: Performed by: ORTHOPAEDIC SURGERY

## 2019-04-01 PROCEDURE — C1713 ANCHOR/SCREW BN/BN,TIS/BN: HCPCS | Performed by: ORTHOPAEDIC SURGERY

## 2019-04-01 PROCEDURE — 3209999900 FLUORO FOR SURGICAL PROCEDURES

## 2019-04-01 PROCEDURE — 2580000003 HC RX 258: Performed by: ORTHOPAEDIC SURGERY

## 2019-04-01 PROCEDURE — 87015 SPECIMEN INFECT AGNT CONCNTJ: CPT

## 2019-04-01 PROCEDURE — 3600000015 HC SURGERY LEVEL 5 ADDTL 15MIN: Performed by: ORTHOPAEDIC SURGERY

## 2019-04-01 PROCEDURE — 2709999900 HC NON-CHARGEABLE SUPPLY: Performed by: ORTHOPAEDIC SURGERY

## 2019-04-01 PROCEDURE — 2500000003 HC RX 250 WO HCPCS: Performed by: NURSE ANESTHETIST, CERTIFIED REGISTERED

## 2019-04-01 PROCEDURE — 87070 CULTURE OTHR SPECIMN AEROBIC: CPT

## 2019-04-01 PROCEDURE — 2580000003 HC RX 258: Performed by: ANESTHESIOLOGY

## 2019-04-01 PROCEDURE — 3600000005 HC SURGERY LEVEL 5 BASE: Performed by: ORTHOPAEDIC SURGERY

## 2019-04-01 PROCEDURE — 6360000002 HC RX W HCPCS: Performed by: NURSE ANESTHETIST, CERTIFIED REGISTERED

## 2019-04-01 PROCEDURE — 7100000001 HC PACU RECOVERY - ADDTL 15 MIN: Performed by: ORTHOPAEDIC SURGERY

## 2019-04-01 PROCEDURE — 6360000002 HC RX W HCPCS: Performed by: ORTHOPAEDIC SURGERY

## 2019-04-01 PROCEDURE — 7100000000 HC PACU RECOVERY - FIRST 15 MIN: Performed by: ORTHOPAEDIC SURGERY

## 2019-04-01 PROCEDURE — 87116 MYCOBACTERIA CULTURE: CPT

## 2019-04-01 PROCEDURE — 73060 X-RAY EXAM OF HUMERUS: CPT

## 2019-04-01 PROCEDURE — 7100000011 HC PHASE II RECOVERY - ADDTL 15 MIN: Performed by: ORTHOPAEDIC SURGERY

## 2019-04-01 PROCEDURE — 2500000003 HC RX 250 WO HCPCS: Performed by: ANESTHESIOLOGY

## 2019-04-01 PROCEDURE — 7100000010 HC PHASE II RECOVERY - FIRST 15 MIN: Performed by: ORTHOPAEDIC SURGERY

## 2019-04-01 PROCEDURE — 6370000000 HC RX 637 (ALT 250 FOR IP): Performed by: ANESTHESIOLOGY

## 2019-04-01 PROCEDURE — 3700000001 HC ADD 15 MINUTES (ANESTHESIA): Performed by: ORTHOPAEDIC SURGERY

## 2019-04-01 PROCEDURE — 76942 ECHO GUIDE FOR BIOPSY: CPT | Performed by: ANESTHESIOLOGY

## 2019-04-01 PROCEDURE — 87206 SMEAR FLUORESCENT/ACID STAI: CPT

## 2019-04-01 PROCEDURE — 87075 CULTR BACTERIA EXCEPT BLOOD: CPT

## 2019-04-01 PROCEDURE — 87205 SMEAR GRAM STAIN: CPT

## 2019-04-01 DEVICE — IMPLANTABLE DEVICE: Type: IMPLANTABLE DEVICE | Site: HUMERUS | Status: FUNCTIONAL

## 2019-04-01 DEVICE — WASHER ORTH DIA3.5MM CORT DST FIBULAR EL LO PROF FOR FRAC: Type: IMPLANTABLE DEVICE | Site: HUMERUS | Status: FUNCTIONAL

## 2019-04-01 DEVICE — SCREW BNE L18MM DIA3.5MM STD CORT DST TIB TI ST LOK FULL: Type: IMPLANTABLE DEVICE | Site: HUMERUS | Status: FUNCTIONAL

## 2019-04-01 DEVICE — SCREW BNE L20MM DIA3.5MM STD CORT DST TIB TI ST LOK FULL: Type: IMPLANTABLE DEVICE | Site: HUMERUS | Status: FUNCTIONAL

## 2019-04-01 DEVICE — SCREW BNE L22MM DIA3.5MM STD DST CORT TIB TI ST: Type: IMPLANTABLE DEVICE | Site: HUMERUS | Status: FUNCTIONAL

## 2019-04-01 DEVICE — IMPLANTABLE DEVICE
Type: IMPLANTABLE DEVICE | Site: HUMERUS | Status: FUNCTIONAL
Brand: LOW PROFILE CORTICAL SCREW

## 2019-04-01 DEVICE — SCREW BNE L24MM DIA3.5MM STD CORT DST TIB TI ST LOK FULL: Type: IMPLANTABLE DEVICE | Site: HUMERUS | Status: FUNCTIONAL

## 2019-04-01 DEVICE — SCREW BNE L20MM DIA3.5MM CO CHROM ST LOK FULL THRD SQ DRV: Type: IMPLANTABLE DEVICE | Site: HUMERUS | Status: FUNCTIONAL

## 2019-04-01 DEVICE — SCREW BNE L22MM DIA3.5MM CORT DST TIB TYP II ANODIZED TI ST: Type: IMPLANTABLE DEVICE | Site: HUMERUS | Status: FUNCTIONAL

## 2019-04-01 DEVICE — SCREW BNE L26MM DIA3.5MM CORT DST TIB TYP II ANODIZED TI ST: Type: IMPLANTABLE DEVICE | Site: HUMERUS | Status: FUNCTIONAL

## 2019-04-01 DEVICE — SCREW BNE L28MM DIA3.5MM CORT DST TIB TYP II ANODIZED ST: Type: IMPLANTABLE DEVICE | Site: HUMERUS | Status: FUNCTIONAL

## 2019-04-01 RX ORDER — OXYCODONE AND ACETAMINOPHEN 10; 325 MG/1; MG/1
1 TABLET ORAL EVERY 4 HOURS PRN
Qty: 120 TABLET | Refills: 0 | Status: SHIPPED | OUTPATIENT
Start: 2019-04-01 | End: 2019-04-08

## 2019-04-01 RX ORDER — PROPOFOL 10 MG/ML
INJECTION, EMULSION INTRAVENOUS PRN
Status: DISCONTINUED | OUTPATIENT
Start: 2019-04-01 | End: 2019-04-01 | Stop reason: SDUPTHER

## 2019-04-01 RX ORDER — EPHEDRINE SULFATE 50 MG/ML
INJECTION INTRAVENOUS PRN
Status: DISCONTINUED | OUTPATIENT
Start: 2019-04-01 | End: 2019-04-01 | Stop reason: SDUPTHER

## 2019-04-01 RX ORDER — LIDOCAINE HYDROCHLORIDE 10 MG/ML
0.3 INJECTION, SOLUTION EPIDURAL; INFILTRATION; INTRACAUDAL; PERINEURAL
Status: COMPLETED | OUTPATIENT
Start: 2019-04-01 | End: 2019-04-01

## 2019-04-01 RX ORDER — SODIUM CHLORIDE 0.9 % (FLUSH) 0.9 %
10 SYRINGE (ML) INJECTION EVERY 12 HOURS SCHEDULED
Status: DISCONTINUED | OUTPATIENT
Start: 2019-04-01 | End: 2019-04-01 | Stop reason: HOSPADM

## 2019-04-01 RX ORDER — ONDANSETRON 2 MG/ML
INJECTION INTRAMUSCULAR; INTRAVENOUS PRN
Status: DISCONTINUED | OUTPATIENT
Start: 2019-04-01 | End: 2019-04-01 | Stop reason: SDUPTHER

## 2019-04-01 RX ORDER — OXYCODONE HYDROCHLORIDE AND ACETAMINOPHEN 5; 325 MG/1; MG/1
2 TABLET ORAL PRN
Status: COMPLETED | OUTPATIENT
Start: 2019-04-01 | End: 2019-04-01

## 2019-04-01 RX ORDER — LIDOCAINE HYDROCHLORIDE 20 MG/ML
INJECTION, SOLUTION INFILTRATION; PERINEURAL PRN
Status: DISCONTINUED | OUTPATIENT
Start: 2019-04-01 | End: 2019-04-01 | Stop reason: SDUPTHER

## 2019-04-01 RX ORDER — ONDANSETRON 2 MG/ML
4 INJECTION INTRAMUSCULAR; INTRAVENOUS EVERY 30 MIN PRN
Status: DISCONTINUED | OUTPATIENT
Start: 2019-04-01 | End: 2019-04-01 | Stop reason: HOSPADM

## 2019-04-01 RX ORDER — OXYCODONE HYDROCHLORIDE AND ACETAMINOPHEN 5; 325 MG/1; MG/1
1 TABLET ORAL PRN
Status: COMPLETED | OUTPATIENT
Start: 2019-04-01 | End: 2019-04-01

## 2019-04-01 RX ORDER — ROCURONIUM BROMIDE 10 MG/ML
INJECTION, SOLUTION INTRAVENOUS PRN
Status: DISCONTINUED | OUTPATIENT
Start: 2019-04-01 | End: 2019-04-01 | Stop reason: SDUPTHER

## 2019-04-01 RX ORDER — MIDAZOLAM HYDROCHLORIDE 1 MG/ML
INJECTION INTRAMUSCULAR; INTRAVENOUS PRN
Status: DISCONTINUED | OUTPATIENT
Start: 2019-04-01 | End: 2019-04-01 | Stop reason: SDUPTHER

## 2019-04-01 RX ORDER — DIPHENHYDRAMINE HYDROCHLORIDE 50 MG/ML
6.25 INJECTION INTRAMUSCULAR; INTRAVENOUS
Status: DISCONTINUED | OUTPATIENT
Start: 2019-04-01 | End: 2019-04-01 | Stop reason: HOSPADM

## 2019-04-01 RX ORDER — MEPERIDINE HYDROCHLORIDE 50 MG/ML
12.5 INJECTION INTRAMUSCULAR; INTRAVENOUS; SUBCUTANEOUS EVERY 5 MIN PRN
Status: DISCONTINUED | OUTPATIENT
Start: 2019-04-01 | End: 2019-04-01 | Stop reason: HOSPADM

## 2019-04-01 RX ORDER — GLYCOPYRROLATE 0.2 MG/ML
INJECTION INTRAMUSCULAR; INTRAVENOUS PRN
Status: DISCONTINUED | OUTPATIENT
Start: 2019-04-01 | End: 2019-04-01 | Stop reason: SDUPTHER

## 2019-04-01 RX ORDER — SODIUM CHLORIDE 0.9 % (FLUSH) 0.9 %
10 SYRINGE (ML) INJECTION PRN
Status: DISCONTINUED | OUTPATIENT
Start: 2019-04-01 | End: 2019-04-01 | Stop reason: HOSPADM

## 2019-04-01 RX ORDER — FENTANYL CITRATE 50 UG/ML
INJECTION, SOLUTION INTRAMUSCULAR; INTRAVENOUS PRN
Status: DISCONTINUED | OUTPATIENT
Start: 2019-04-01 | End: 2019-04-01 | Stop reason: SDUPTHER

## 2019-04-01 RX ORDER — OXYCODONE AND ACETAMINOPHEN 10; 325 MG/1; MG/1
1 TABLET ORAL EVERY 4 HOURS PRN
Qty: 30 TABLET | Refills: 0 | Status: SHIPPED | OUTPATIENT
Start: 2019-04-01 | End: 2019-04-08

## 2019-04-01 RX ORDER — LABETALOL HYDROCHLORIDE 5 MG/ML
5 INJECTION, SOLUTION INTRAVENOUS
Status: DISCONTINUED | OUTPATIENT
Start: 2019-04-01 | End: 2019-04-01 | Stop reason: HOSPADM

## 2019-04-01 RX ORDER — SODIUM CHLORIDE, SODIUM LACTATE, POTASSIUM CHLORIDE, CALCIUM CHLORIDE 600; 310; 30; 20 MG/100ML; MG/100ML; MG/100ML; MG/100ML
INJECTION, SOLUTION INTRAVENOUS CONTINUOUS
Status: DISCONTINUED | OUTPATIENT
Start: 2019-04-01 | End: 2019-04-01 | Stop reason: HOSPADM

## 2019-04-01 RX ORDER — HYDRALAZINE HYDROCHLORIDE 20 MG/ML
5 INJECTION INTRAMUSCULAR; INTRAVENOUS EVERY 30 MIN PRN
Status: DISCONTINUED | OUTPATIENT
Start: 2019-04-01 | End: 2019-04-01 | Stop reason: HOSPADM

## 2019-04-01 RX ORDER — MAGNESIUM HYDROXIDE 1200 MG/15ML
LIQUID ORAL CONTINUOUS PRN
Status: COMPLETED | OUTPATIENT
Start: 2019-04-01 | End: 2019-04-01

## 2019-04-01 RX ORDER — DEXAMETHASONE SODIUM PHOSPHATE 4 MG/ML
INJECTION, SOLUTION INTRA-ARTICULAR; INTRALESIONAL; INTRAMUSCULAR; INTRAVENOUS; SOFT TISSUE PRN
Status: DISCONTINUED | OUTPATIENT
Start: 2019-04-01 | End: 2019-04-01 | Stop reason: SDUPTHER

## 2019-04-01 RX ADMIN — ONDANSETRON 4 MG: 2 INJECTION INTRAMUSCULAR; INTRAVENOUS at 07:42

## 2019-04-01 RX ADMIN — Medication 2 G: at 07:45

## 2019-04-01 RX ADMIN — PHENYLEPHRINE HYDROCHLORIDE 100 MCG: 10 INJECTION INTRAVENOUS at 10:16

## 2019-04-01 RX ADMIN — ROCURONIUM BROMIDE 10 MG: 10 SOLUTION INTRAVENOUS at 09:21

## 2019-04-01 RX ADMIN — LIDOCAINE HYDROCHLORIDE 60 MG: 20 INJECTION, SOLUTION INFILTRATION; PERINEURAL at 07:42

## 2019-04-01 RX ADMIN — SODIUM CHLORIDE, POTASSIUM CHLORIDE, SODIUM LACTATE AND CALCIUM CHLORIDE: 600; 310; 30; 20 INJECTION, SOLUTION INTRAVENOUS at 12:43

## 2019-04-01 RX ADMIN — SODIUM CHLORIDE, POTASSIUM CHLORIDE, SODIUM LACTATE AND CALCIUM CHLORIDE: 600; 310; 30; 20 INJECTION, SOLUTION INTRAVENOUS at 06:45

## 2019-04-01 RX ADMIN — LIDOCAINE HYDROCHLORIDE 0.3 ML: 10 INJECTION, SOLUTION EPIDURAL; INFILTRATION; INTRACAUDAL; PERINEURAL at 06:45

## 2019-04-01 RX ADMIN — SUGAMMADEX 200 MG: 100 INJECTION, SOLUTION INTRAVENOUS at 12:09

## 2019-04-01 RX ADMIN — PROPOFOL 200 MG: 10 INJECTION, EMULSION INTRAVENOUS at 07:42

## 2019-04-01 RX ADMIN — PHENYLEPHRINE HYDROCHLORIDE 100 MCG: 10 INJECTION INTRAVENOUS at 09:46

## 2019-04-01 RX ADMIN — EPHEDRINE SULFATE 10 MG: 50 INJECTION INTRAVENOUS at 09:02

## 2019-04-01 RX ADMIN — EPHEDRINE SULFATE 10 MG: 50 INJECTION INTRAVENOUS at 09:21

## 2019-04-01 RX ADMIN — PHENYLEPHRINE HYDROCHLORIDE 100 MCG: 10 INJECTION INTRAVENOUS at 10:07

## 2019-04-01 RX ADMIN — SODIUM CHLORIDE, POTASSIUM CHLORIDE, SODIUM LACTATE AND CALCIUM CHLORIDE: 600; 310; 30; 20 INJECTION, SOLUTION INTRAVENOUS at 08:27

## 2019-04-01 RX ADMIN — DEXAMETHASONE SODIUM PHOSPHATE 8 MG: 4 INJECTION, SOLUTION INTRAMUSCULAR; INTRAVENOUS at 07:42

## 2019-04-01 RX ADMIN — ROCURONIUM BROMIDE 10 MG: 10 SOLUTION INTRAVENOUS at 10:04

## 2019-04-01 RX ADMIN — SODIUM CHLORIDE, POTASSIUM CHLORIDE, SODIUM LACTATE AND CALCIUM CHLORIDE: 600; 310; 30; 20 INJECTION, SOLUTION INTRAVENOUS at 09:46

## 2019-04-01 RX ADMIN — GLYCOPYRROLATE 0.2 MG: 0.2 INJECTION, SOLUTION INTRAMUSCULAR; INTRAVENOUS at 10:49

## 2019-04-01 RX ADMIN — EPHEDRINE SULFATE 10 MG: 50 INJECTION INTRAVENOUS at 07:46

## 2019-04-01 RX ADMIN — EPHEDRINE SULFATE 10 MG: 50 INJECTION INTRAVENOUS at 08:38

## 2019-04-01 RX ADMIN — MIDAZOLAM HYDROCHLORIDE 2 MG: 2 INJECTION, SOLUTION INTRAMUSCULAR; INTRAVENOUS at 07:35

## 2019-04-01 RX ADMIN — FENTANYL CITRATE 100 MCG: 50 INJECTION INTRAMUSCULAR; INTRAVENOUS at 07:42

## 2019-04-01 RX ADMIN — PHENYLEPHRINE HYDROCHLORIDE 100 MCG: 10 INJECTION INTRAVENOUS at 09:48

## 2019-04-01 RX ADMIN — ROCURONIUM BROMIDE 10 MG: 10 SOLUTION INTRAVENOUS at 08:30

## 2019-04-01 RX ADMIN — ROCURONIUM BROMIDE 50 MG: 10 SOLUTION INTRAVENOUS at 07:42

## 2019-04-01 RX ADMIN — Medication 1 G: at 11:41

## 2019-04-01 RX ADMIN — ROCURONIUM BROMIDE 10 MG: 10 SOLUTION INTRAVENOUS at 10:36

## 2019-04-01 RX ADMIN — OXYCODONE AND ACETAMINOPHEN 2 TABLET: 5; 325 TABLET ORAL at 15:11

## 2019-04-01 RX ADMIN — EPHEDRINE SULFATE 10 MG: 50 INJECTION INTRAVENOUS at 09:47

## 2019-04-01 ASSESSMENT — PULMONARY FUNCTION TESTS
PIF_VALUE: 21
PIF_VALUE: 22
PIF_VALUE: 3
PIF_VALUE: 10
PIF_VALUE: 17
PIF_VALUE: 22
PIF_VALUE: 23
PIF_VALUE: 23
PIF_VALUE: 2
PIF_VALUE: 22
PIF_VALUE: 22
PIF_VALUE: 0
PIF_VALUE: 22
PIF_VALUE: 23
PIF_VALUE: 22
PIF_VALUE: 21
PIF_VALUE: 23
PIF_VALUE: 22
PIF_VALUE: 0
PIF_VALUE: 19
PIF_VALUE: 23
PIF_VALUE: 22
PIF_VALUE: 22
PIF_VALUE: 21
PIF_VALUE: 22
PIF_VALUE: 22
PIF_VALUE: 23
PIF_VALUE: 22
PIF_VALUE: 20
PIF_VALUE: 23
PIF_VALUE: 22
PIF_VALUE: 23
PIF_VALUE: 17
PIF_VALUE: 22
PIF_VALUE: 24
PIF_VALUE: 22
PIF_VALUE: 22
PIF_VALUE: 16
PIF_VALUE: 21
PIF_VALUE: 21
PIF_VALUE: 23
PIF_VALUE: 22
PIF_VALUE: 23
PIF_VALUE: 23
PIF_VALUE: 21
PIF_VALUE: 23
PIF_VALUE: 22
PIF_VALUE: 21
PIF_VALUE: 9
PIF_VALUE: 22
PIF_VALUE: 23
PIF_VALUE: 22
PIF_VALUE: 17
PIF_VALUE: 23
PIF_VALUE: 21
PIF_VALUE: 23
PIF_VALUE: 23
PIF_VALUE: 22
PIF_VALUE: 23
PIF_VALUE: 23
PIF_VALUE: 1
PIF_VALUE: 17
PIF_VALUE: 22
PIF_VALUE: 23
PIF_VALUE: 14
PIF_VALUE: 22
PIF_VALUE: 23
PIF_VALUE: 22
PIF_VALUE: 23
PIF_VALUE: 22
PIF_VALUE: 18
PIF_VALUE: 22
PIF_VALUE: 22
PIF_VALUE: 23
PIF_VALUE: 22
PIF_VALUE: 22
PIF_VALUE: 0
PIF_VALUE: 18
PIF_VALUE: 23
PIF_VALUE: 17
PIF_VALUE: 20
PIF_VALUE: 22
PIF_VALUE: 23
PIF_VALUE: 22
PIF_VALUE: 22
PIF_VALUE: 21
PIF_VALUE: 22
PIF_VALUE: 23
PIF_VALUE: 22
PIF_VALUE: 23
PIF_VALUE: 21
PIF_VALUE: 22
PIF_VALUE: 23
PIF_VALUE: 22
PIF_VALUE: 21
PIF_VALUE: 22
PIF_VALUE: 18
PIF_VALUE: 3
PIF_VALUE: 23
PIF_VALUE: 27
PIF_VALUE: 21
PIF_VALUE: 21
PIF_VALUE: 22
PIF_VALUE: 21
PIF_VALUE: 22
PIF_VALUE: 2
PIF_VALUE: 23
PIF_VALUE: 22
PIF_VALUE: 23
PIF_VALUE: 23
PIF_VALUE: 16
PIF_VALUE: 22
PIF_VALUE: 22
PIF_VALUE: 21
PIF_VALUE: 21
PIF_VALUE: 22
PIF_VALUE: 17
PIF_VALUE: 21
PIF_VALUE: 23
PIF_VALUE: 22
PIF_VALUE: 23
PIF_VALUE: 22
PIF_VALUE: 22
PIF_VALUE: 23
PIF_VALUE: 21
PIF_VALUE: 23
PIF_VALUE: 22
PIF_VALUE: 2
PIF_VALUE: 17
PIF_VALUE: 23
PIF_VALUE: 23
PIF_VALUE: 21
PIF_VALUE: 22
PIF_VALUE: 22
PIF_VALUE: 21
PIF_VALUE: 22
PIF_VALUE: 13
PIF_VALUE: 21
PIF_VALUE: 22
PIF_VALUE: 23
PIF_VALUE: 22
PIF_VALUE: 17
PIF_VALUE: 22
PIF_VALUE: 17
PIF_VALUE: 21
PIF_VALUE: 16
PIF_VALUE: 18
PIF_VALUE: 21
PIF_VALUE: 23
PIF_VALUE: 21
PIF_VALUE: 3
PIF_VALUE: 17
PIF_VALUE: 23
PIF_VALUE: 21
PIF_VALUE: 22
PIF_VALUE: 21
PIF_VALUE: 23
PIF_VALUE: 22
PIF_VALUE: 21
PIF_VALUE: 2
PIF_VALUE: 22
PIF_VALUE: 17
PIF_VALUE: 23
PIF_VALUE: 22
PIF_VALUE: 21
PIF_VALUE: 23
PIF_VALUE: 22
PIF_VALUE: 2
PIF_VALUE: 21
PIF_VALUE: 22
PIF_VALUE: 23
PIF_VALUE: 22
PIF_VALUE: 22
PIF_VALUE: 2
PIF_VALUE: 22
PIF_VALUE: 2
PIF_VALUE: 21
PIF_VALUE: 22
PIF_VALUE: 23
PIF_VALUE: 22
PIF_VALUE: 15
PIF_VALUE: 17
PIF_VALUE: 23
PIF_VALUE: 17
PIF_VALUE: 22
PIF_VALUE: 1
PIF_VALUE: 21
PIF_VALUE: 23
PIF_VALUE: 2
PIF_VALUE: 23
PIF_VALUE: 5
PIF_VALUE: 23
PIF_VALUE: 22
PIF_VALUE: 21
PIF_VALUE: 21
PIF_VALUE: 22
PIF_VALUE: 23
PIF_VALUE: 22
PIF_VALUE: 22
PIF_VALUE: 23
PIF_VALUE: 22
PIF_VALUE: 22
PIF_VALUE: 21
PIF_VALUE: 22
PIF_VALUE: 21
PIF_VALUE: 23
PIF_VALUE: 23
PIF_VALUE: 22
PIF_VALUE: 22
PIF_VALUE: 23
PIF_VALUE: 22
PIF_VALUE: 0
PIF_VALUE: 22
PIF_VALUE: 23
PIF_VALUE: 23
PIF_VALUE: 21
PIF_VALUE: 21
PIF_VALUE: 22
PIF_VALUE: 22
PIF_VALUE: 21
PIF_VALUE: 22
PIF_VALUE: 17
PIF_VALUE: 19
PIF_VALUE: 23
PIF_VALUE: 21
PIF_VALUE: 22
PIF_VALUE: 21
PIF_VALUE: 2
PIF_VALUE: 22
PIF_VALUE: 21
PIF_VALUE: 2
PIF_VALUE: 23
PIF_VALUE: 23
PIF_VALUE: 16
PIF_VALUE: 2
PIF_VALUE: 23
PIF_VALUE: 23
PIF_VALUE: 22
PIF_VALUE: 21
PIF_VALUE: 17

## 2019-04-01 ASSESSMENT — PAIN SCALES - GENERAL
PAINLEVEL_OUTOF10: 5
PAINLEVEL_OUTOF10: 0

## 2019-04-01 ASSESSMENT — PAIN DESCRIPTION - PAIN TYPE: TYPE: SURGICAL PAIN

## 2019-04-01 ASSESSMENT — PAIN DESCRIPTION - LOCATION: LOCATION: ARM

## 2019-04-01 ASSESSMENT — LIFESTYLE VARIABLES: SMOKING_STATUS: 1

## 2019-04-01 ASSESSMENT — PAIN - FUNCTIONAL ASSESSMENT: PAIN_FUNCTIONAL_ASSESSMENT: 0-10

## 2019-04-01 ASSESSMENT — PAIN DESCRIPTION - ORIENTATION: ORIENTATION: RIGHT

## 2019-04-01 NOTE — ANESTHESIA POSTPROCEDURE EVALUATION
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        Anesthesia Post Evaluation    Comments: Postoperative Anesthesia Note    Name:    Jayy Robert  MRN:      [id number]    Patient Vitals in the past 12 hrs:  04/01/19 1400, BP:93/64, Temp:97 °F (36.1 °C), Temp src:Temporal, Pulse:86, Resp:14, SpO2:96 %  04/01/19 1341, BP:(!) 96/53, Pulse:99, Resp:18, SpO2:92 %  04/01/19 1330, BP:(!) 77/46, Pulse:83, Resp:19, SpO2:92 %  04/01/19 1315, BP:(!) 83/47, Pulse:86, Resp:14, SpO2:91 %  04/01/19 1305, BP:(!) 81/46, Pulse:81, Resp:15, SpO2:95 %  04/01/19 1300, BP:(!) 91/50, Temp:97 °F (36.1 °C), Temp src:Temporal, Pulse:79, Resp:13, SpO2:96 %  04/01/19 1255, BP:(!) 87/35, Pulse:87, Resp:20, SpO2:(!) 89 %  04/01/19 1250, BP:(!) 83/43, Pulse:80, Resp:13, SpO2:95 %  04/01/19 1245, BP:(!) 100/52, Pulse:66, Resp:15, SpO2:95 %  04/01/19 1240, BP:(!) 83/41, Pulse:69, Resp:15, SpO2:96 %  04/01/19 1235, BP:(!) 103/51, Pulse:77, Resp:17, SpO2:95 %  04/01/19 1230, BP:(!) 80/41, Pulse:96, Resp:24, SpO2:93 %  04/01/19 1225, BP:(!) 79/43, Pulse:92, Resp:17, SpO2:94 %  04/01/19 1220, BP:104/60, Temp:97 °F transferred from Anesthesiology department on discharge from perioperative area

## 2019-04-01 NOTE — PROGRESS NOTES
Transfer received from PACU  Pt awake and oriented, family at bedside. Very limited movement to RUE- block. Denies pain. Call light in reach.

## 2019-04-01 NOTE — H&P
I have reviewed the History & Physical and examined the patient and find no relevant changes. I have reviewed with the patient and/or family the risks, benefits, and alternatives to the procedure(s). All questions and concerns were addressed. Consent is on the chart. Surgical site, right arm/humerus, has been marked by Dr Ede Freed and confirmed by the patient. Hip marked by Dr Shamar Modi. The risks and benefits of surgical fixation versus non-operative management were discussed thoroughly. These included, but were not limited to infection, tendon or nerve injury, stiffness or pain of the elbow/wrist joint long-term, malunion, nonunion, implant failure, tendon rupture, scar sensitivity, adverse effects of anesthesia (stroke or death), and possible need for hardware removal.    All questions and concerns were addressed today. Patient is in agreement with the plan.         Ntahan Giles MD  Hand & Upper Extremity Surgery  [facility] [place] [facility] ([facility])

## 2019-04-01 NOTE — ANESTHESIA PRE PROCEDURE
Provider Last Rate Last Dose    lactated ringers infusion   Intravenous Continuous Carly Mc  mL/hr at 04/01/19 0645      sodium chloride flush 0.9 % injection 10 mL  10 mL Intravenous 2 times per day Carly Mc MD        sodium chloride flush 0.9 % injection 10 mL  10 mL Intravenous PRN Carly Mc MD        ceFAZolin (ANCEF) 2 g in sterile water 20 mL IV syringe  2 g Intravenous On Call to [address], MD        oxyCODONE-acetaminophen (PERCOCET) 5-325 MG per tablet 1 tablet  1 tablet Oral PRN Kuldip Meals, MD        Or    oxyCODONE-acetaminophen (PERCOCET) 5-325 MG per tablet 2 tablet  2 tablet Oral PRN Kuldip Meals, MD        diphenhydrAMINE (BENADRYL) injection 6.25 mg  6.25 mg Intravenous Once PRN Kuldip Meals, MD        ondansetron [facility]) injection 4 mg  4 mg Intravenous Q30 Min PRN Kuldip Meals, MD        labetalol (NORMODYNE;TRANDATE) injection 5 mg  5 mg Intravenous Q15 Min PRN Kuldip Meals, MD        hydrALAZINE (APRESOLINE) injection 5 mg  5 mg Intravenous Q30 Min PRN Kuldip Meals, MD        meperidine (DEMEROL) injection 12.5 mg  12.5 mg Intravenous Q5 Min PRN Kuldip Meals, MD           Allergies:     Allergies   Allergen Reactions    Mustard Seed Swelling     Throat closes up       Problem List:    Patient Active Problem List   Diagnosis Code    Severe recurrent major depressive disorder with psychotic features ([facility] [address] F33.3    Fracture, humerus S42.309A    Displaced fracture of distal end of right humerus S42.401A    Toxic encephalopathy G92    Acute respiratory failure with hypoxia ([address] J96.01    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 No pertinent surgical history. Social History:    Social History     Tobacco Use    Smoking status: Current Every Day Smoker     Packs/day: 1.00     Years: 35.00     Pack years: 35.00     Types: Cigarettes    Smokeless tobacco: Never Used   Substance Use Topics    Alcohol use: Yes     Comment: socially; 1-2 drinks per month                                Ready to quit: Not Answered  Counseling given: Not Answered      Vital Signs (Current):   Vitals:    03/28/19 0949 04/01/19 0636   BP:  100/69   Pulse:  78   Resp:  14   Temp:  97.6 °F (36.4 °C)   TempSrc:  Temporal   SpO2:  97%   Weight: 125 lb (56.7 kg) 125 lb (56.7 kg)   Height: 5' 9\" (1.753 m) 5' 9\" (1.753 m)                                              BP Readings from Last 3 Encounters:   04/01/19 100/69   03/21/19 120/82   02/26/19 128/81       NPO Status: Time of last liquid consumption: 2230                        Time of last solid consumption: 2230                        Date of last liquid consumption: 03/31/19                        Date of last solid food consumption: 03/31/19    BMI:   Wt Readings from Last 3 Encounters:   04/01/19 125 lb (56.7 kg)   03/21/19 124 lb (56.2 kg)   02/26/19 132 lb 0.9 oz (59.9 kg)     Body mass index is 18.46 kg/m².     CBC:   Lab Results   Component Value Date    WBC 7.9 03/21/2019    RBC 5.71 03/21/2019    HGB 17.7 03/21/2019    HCT 53.7 03/21/2019    MCV 94.0 03/21/2019    RDW 14.4 03/21/2019     03/21/2019       CMP:   Lab Results   Component Value Date     03/21/2019    K 4.4 03/21/2019    K 4.2 01/18/2019    CL 98 03/21/2019    CO2 28 03/21/2019    BUN 6 03/21/2019    CREATININE 0.8 03/21/2019    GFRAA >60 03/21/2019    AGRATIO 1.6 03/21/2019    LABGLOM >60 03/21/2019    GLUCOSE 95 03/21/2019    PROT 7.6 03/21/2019    CALCIUM 10.1 03/21/2019    BILITOT 0.4 03/21/2019    ALKPHOS 186 03/21/2019    AST 13 03/21/2019    ALT <5 03/21/2019       POC Tests: No results for input(s): POCGLU, POCNA, POCK, POCCL, POCBUN, POCHEMO, POCHCT in the last 72 hours. Coags:   Lab Results   Component Value Date    PROTIME 12.0 01/16/2019    INR 1.05 01/16/2019       HCG (If Applicable): No results found for: PREGTESTUR, PREGSERUM, HCG, HCGQUANT     ABGs: No results found for: PHART, PO2ART, KXG1RBR, YXT4BQS, BEART, O5NJQRXX     Type & Screen (If Applicable):  No results found for: [facility]    Anesthesia Evaluation  Patient summary reviewed and Nursing notes reviewed no history of anesthetic complications:   Airway: Mallampati: II     Neck ROM: full   Dental:          Pulmonary:   (+) COPD:  current smoker                           Cardiovascular:                      Neuro/Psych:   (+) CVA:, psychiatric history:            GI/Hepatic/Renal:             Endo/Other:                     Abdominal:           Vascular:                                        Anesthesia Plan      regional and general     ASA 3     (Medications & allergies reviewed  All available lab & EKG data reviewed  SCB for POA)  Induction: intravenous. Anesthetic plan and risks discussed with patient. Plan discussed with CRNA.                   Desmond Daley MD   4/1/2019

## 2019-04-01 NOTE — BRIEF OP NOTE
Brief Postoperative Note  ______________________________________________________________    Patient: Yamilex Mayorga  YOB: 1975  MRN: [id number]  Date of Procedure: 4/1/2019    Pre-Op Diagnosis: CLOSED DISPLACED COMMINUTED FRACTURE OF SHAFT OF RIGHT HUMERUS WITH NON UNION    Post-Op Diagnosis: Same       Procedure(s):  OPEN 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 WITH AUTOGRAFT and allograft bone graft      CHECKPOINT; BIOMET    Anesthesia: Regional, General    Surgeon(s):  Henri Mosquera MD    Assistant: kary CONNOR    Estimated Blood Loss (mL): 929     Complications: None    Specimens:   ID Type Source Tests Collected by Time Destination   1 : RIGHT HUMERUS NON UNION SITE Specimen Arm FUNGUS CULTURE, SURGICAL CULTURE, ACID FAST CULTURE WITH SMEAR Henri Mosquera MD 4/1/2019 6314        Implants:  Implant Name Type Inv. Item Serial No.  Lot No. LRB No. Used   STAGRAFT FIBER 5CC Bone/Graft/Tissue/Human/Synth STAGRAFT FIBER 5CC  [employer] [id number] Right 1     Biomet Plates.         Drains: * No LDAs found *    Findings: see full op note      Henri Mosquera MD  Date: 4/1/2019  Time: 12:01 PM

## 2019-04-03 NOTE — OP NOTE
[facility]                 Marcelina Rodrigez                                OPERATIVE REPORT    PATIENT NAME: Cyndee Yepez                    :        1975  MED REC NO:   [id number]                          ROOM:  ACCOUNT NO:   [de-identified]                           ADMIT DATE: 2019  PROVIDER:     Carrie Whitfield MD    DATE OF PROCEDURE:  2019    LOCATION:  [address] [place] [address]. PREOPERATIVE DIAGNOSIS:  Displaced comminuted fracture of the shaft of  the right humerus with nonunion. POSTOPERATIVE DIAGNOSIS:  Displaced comminuted fracture of the shaft of  the right humerus with nonunion. OPERATION PERFORMED:  1. Open repair with fixation of the right humeral shaft nonunion  utilizing autograft and allograft bone grafting. 2.  Right elbow ulnar nerve decompression and subcutaneous  transposition. 3.  Intraoperative use of nerve stimulation. 4.  X-ray, right elbow with intraoperative interpretation. SURGEON:  Carrie Whitfield MD    ASSISTANT:  SA Wen    ANESTHESIA:  General and regional.    ESTIMATED BLOOD LOSS:  300 mL. COMPLICATIONS:  None. SPECIMEN:  Cultures from right humerus nonunion site. IMPLANTS:  1. Biomet allograft bone grafting. 2.  Biomet plate and screws. HISTORY OF PRESENT ILLNESS:  The patient is a gentleman, who sustained a  right humeral fracture approximately eight months ago, which  unfortunately went onto nonunion. He was recently referred to my office  for surgical repair of the nonunion. He was interested in the surgery  as he had persistent pain and symptoms despite extensive nonoperative  measures. Risks and benefits were carefully and thoroughly outlined. These did include the possibility of tendon or nerve damage, need for  transfusion, compartment syndrome, persistent nonunion, malunion, or  delayed union.   The patient understood the significant risk of nerve  injury with this type of surgery with the close proximity of both the  radial and ulnar nerves. He desired to proceed. He did not want to  live with his arm in the current condition. He is still smoking, my  concern with this was outlined to the patient, this can lead to  nonunion. Plan was to proceed with surgical repair of the humeral  nonunion along with placement of iliac crest bone grafting with my  colleague, Dr. Sole Barrera. Both the right arm and right hip were  marked in preop holding by Dr. Amandeep Cheek and verified by the  patient. Informed consent was signed on the chart. All risks and  benefits were clearly and thoroughly outlined the day of surgery. Plan  for 24-hour observation hospitalization after surgery was in place. The  patient received a regional anesthetic to the right upper extremity by  the Anesthesia team preoperatively without complication. OPERATIVE COURSE:  The patient was taken to the operating room, placed  in the usual supine position initially, and general anesthesia was  given. He was then safely positioned in the lateral position with all  bony prominences well padded including the left ulnar nerve, including  both peroneal nerves. The right upper extremity was safely and  carefully padded and placed on the elbow dominguez. There was a fresh  shaving on the skin. The right upper extremity was now prepped and  draped in normal sterile fashion. Antibiotic and DVT prophylaxis was in  place and a formal time-out was held. Tourniquet sterilely was in the  room for safety purposes, but not used during the surgery. Following the time-out, we now began the case. A long posterior  incision above the elbow and humerus was carried out through skin and  subcutaneous tissue. Full-thickness skin flaps were elevated both  medially and ulnarly with meticulous hemostasis. On the medial side of  the triceps, the ulnar nerve was identified.   It was quite tight significant displacement of the humeral  shaft present. Soft tissue release was required. Nearby anterior  structures were also carefully protected. Osteotomy about both the  radial and ulnar nerves was quite difficult secondary to the extent of  bony formation. Throughout the case, the Checkpoint nerve stimulator  was utilized to verify both the radial and ulnar nerves. This was also  used to verify the course of the nerves. This was also used to verify  the amplitude of signal at the beginning of the surgery and the time of  their identification, during and following the osteotomy portion, and at  the end of the case. Their signal transduction was good at all  components of the case. At this point, there was realignment of the humeral shaft, very  difficult due to the previous nonunion with hypertrophic bone. This was  also very difficult due to the long-standing soft tissue contracture. Clamps were placed carefully under direct visualization. Intraoperative  fluoroscopy confirmed the nonunion site prior to osteotomy and  afterwards. It was also used to confirm realignment of the humerus and  placement of clamps. At this point, a large long posterolateral plate  from the [employer] elbow system was selected as the most appropriate plate,  especially with its alignment and contour ability. A combination of  non-locking and locking screws was placed proximal and distal to the  fracture. One variable angle lag screw was placed across the fracture  site. Prior to compression and placement of the lag screw, bone graft  from the hypertrophic nonunion, the healthy bleeding bone, combined with  Biomet allograft contained a bone growth stimulator, was mixed and  placed at the nonunion site. Following full placement of all screws,  the nonunion site was packed with the remaining portion of this bone. Therefore, iliac crest harvesting was not required.   Wounds have been  carefully irrigated multiple times throughout the surgery. The elbow  and humerus had good alignment clinically at this point. Range of  motion of the arm revealed good stability at the fracture site. Final  fluoroscopic images confirmed alignment of the humerus, placement of  hardware and graft. Fluoroscopic images were saved and printed. No  other abnormalities were noted. No tourniquet used throughout the case. Bleeding was well controlled at this time, compartments were soft and  there was good pulses at the wrist.    At this point, a standard layered closure of the elbow was undertaken  with staples on the skin, no drain was necessary. Sterile dressings  were placed followed by a large long posterior splint. The patient was  awoken from general anesthesia, tolerated the case well and taken to the  postanesthesia recovery unit in good condition. No complications. ADDENDUM:  Intraoperative x-ray of the right elbow and humerus was  utilized verifying reduction of the humeral shaft along with placement  of bone graft and hardware. Intraoperative interpretation by Dr. Cherelle Rockwell with more than three views were saved and printed. POSTOPERATIVE COURSE:  The patient refused hospital admission following  the surgery. He had good pain control from the regional nerve block and  was adamant on going home. He did have a ride and did have support at  home. He was in a sling. He was allowed to go home per his request.   Pain medication was sent to the pharmacy. He understands good elevation  and strict nonweightbearing of the right arm. We will have him in the  office in the next 10 to 14 days for wound inspection and x-rays of the  right humerus. Jakub Campbell will not be removed for at least four to six  weeks or until the wound is fully healed. No strengthening or lifting  until the nonunion side is healed, this will likely take 6 to 12 weeks. Smoking cessation as outlined to the patient before.         JOHNNY LANDIN, MD    D: 04/02/2019 16:29:08       T: 04/02/2019 23:09:13     [email or web address]  Job#: [id number]     Doc#: [id number]    CC:

## 2019-04-06 LAB
ANAEROBIC CULTURE: NORMAL
CULTURE SURGICAL: NORMAL
GRAM STAIN RESULT: NORMAL

## 2019-04-15 ENCOUNTER — OFFICE VISIT (OUTPATIENT)
Dept: ORTHOPEDIC SURGERY | Age: 44
End: 2019-04-15
Payer: MEDICARE

## 2019-04-15 VITALS — WEIGHT: 125 LBS | BODY MASS INDEX: 18.51 KG/M2 | HEIGHT: 69 IN

## 2019-04-15 DIAGNOSIS — S42.351K CLOSED DISPLACED COMMINUTED FRACTURE OF SHAFT OF RIGHT HUMERUS WITH NONUNION: Primary | ICD-10-CM

## 2019-04-15 PROCEDURE — L3660 SO 8 AB RSTR CAN/WEB PRE OTS: HCPCS | Performed by: ORTHOPAEDIC SURGERY

## 2019-04-15 PROCEDURE — 99024 POSTOP FOLLOW-UP VISIT: CPT | Performed by: ORTHOPAEDIC SURGERY

## 2019-04-15 PROCEDURE — 29065 APPL CST SHO TO HAND LNG ARM: CPT | Performed by: ORTHOPAEDIC SURGERY

## 2019-04-15 RX ORDER — OXYCODONE HYDROCHLORIDE AND ACETAMINOPHEN 5; 325 MG/1; MG/1
1 TABLET ORAL EVERY 6 HOURS PRN
Qty: 28 TABLET | Refills: 0 | Status: SHIPPED | OUTPATIENT
Start: 2019-04-15 | End: 2019-04-22

## 2019-04-15 NOTE — PROGRESS NOTES
and recommended as before as this can cause delayed union or even recurrent nonunion. Unless needed sooner. I would like to see him in 3 weeks. Cast off and repeat x-rays of the humerus. We may consider transitioning to a brace at that point. Appropriate care of the cast was explained today with the patient. The cast is to be left clean, dry, and intact (not to be removed). Appropriate activity restrictions were outlined, which will help prevent further injury and/or fracture displacement. Information was provided to contact my office if having new pain, swelling of the extremity, numbness or discoloration of the fingers/toes, or other changes/problems after cast application. Procedures    DJO Deluxe Shoulder Sling     Patient was supplied a DJO Deluxe Shoulder Sling. This retail item was supplied to provide functional support and assist in protecting the affected area. Verbal and written instructions for the use of and application of this item were provided. The patient was educated and fit by a healthcare professional with expert knowledge and specialization in brace application. They were instructed to contact the office immediately should the equipment result in increased pain, decreased sensation, increased swelling or worsening of the condition.  NH APPLY LONG ARM CAST    NH CAST SUP LONG ARM ADULT FBRG         All questions and concerns were addressed today. Patient is in agreement with the plan. Oracio Alicia MD  Hand & Upper Extremity Surgery  [address]  A partner of [facility] ([facility])        Please note that this transcription was created using voice recognition software. Any errors are unintentional and may be due to voice recognition transcription.

## 2019-04-26 ENCOUNTER — TELEPHONE (OUTPATIENT)
Dept: ORTHOPEDIC SURGERY | Age: 44
End: 2019-04-26

## 2019-04-26 DIAGNOSIS — S42.411A CLOSED SUPRACONDYLAR FRACTURE OF RIGHT HUMERUS, INITIAL ENCOUNTER: Primary | ICD-10-CM

## 2019-04-26 DIAGNOSIS — S42.293K: Primary | ICD-10-CM

## 2019-04-26 RX ORDER — OXYCODONE HYDROCHLORIDE AND ACETAMINOPHEN 5; 325 MG/1; MG/1
1 TABLET ORAL EVERY 8 HOURS PRN
Qty: 21 TABLET | Refills: 0 | Status: SHIPPED | OUTPATIENT
Start: 2019-04-26 | End: 2019-05-03

## 2019-04-26 RX ORDER — OXYCODONE HYDROCHLORIDE AND ACETAMINOPHEN 5; 325 MG/1; MG/1
1 TABLET ORAL EVERY 8 HOURS PRN
Qty: 21 TABLET | Refills: 0 | OUTPATIENT
Start: 2019-04-26 | End: 2019-05-03

## 2019-04-26 NOTE — TELEPHONE ENCOUNTER
SPOKE TO [address] [place] [address] PT. SHE UNDERSTANDS WE ARE TAPERING HIS DOSES DOWN AND THAT WE PRESCRIBE FOR 6 WEEKS FROM SURGICAL DATE.

## 2019-05-06 LAB
FUNGUS (MYCOLOGY) CULTURE: NORMAL
FUNGUS STAIN: NORMAL

## 2019-05-15 ENCOUNTER — OFFICE VISIT (OUTPATIENT)
Dept: ORTHOPEDIC SURGERY | Age: 44
End: 2019-05-15
Payer: MEDICARE

## 2019-05-15 ENCOUNTER — TELEPHONE (OUTPATIENT)
Dept: FAMILY MEDICINE CLINIC | Age: 44
End: 2019-05-15

## 2019-05-15 VITALS — HEIGHT: 69 IN | WEIGHT: 125 LBS | BODY MASS INDEX: 18.51 KG/M2

## 2019-05-15 DIAGNOSIS — M79.601 RIGHT ARM PAIN: Primary | ICD-10-CM

## 2019-05-15 DIAGNOSIS — S42.351K CLOSED DISPLACED COMMINUTED FRACTURE OF SHAFT OF RIGHT HUMERUS WITH NONUNION: ICD-10-CM

## 2019-05-15 PROCEDURE — 99024 POSTOP FOLLOW-UP VISIT: CPT | Performed by: ORTHOPAEDIC SURGERY

## 2019-05-15 PROCEDURE — L3760 EO ADJ JT PREFAB CUSTOM FIT: HCPCS | Performed by: ORTHOPAEDIC SURGERY

## 2019-05-15 RX ORDER — IBUPROFEN 800 MG/1
800 TABLET ORAL EVERY 8 HOURS PRN
Qty: 30 TABLET | Refills: 1 | Status: SHIPPED | OUTPATIENT
Start: 2019-05-15 | End: 2019-06-20 | Stop reason: SDUPTHER

## 2019-05-15 NOTE — PROGRESS NOTES
Chief Complaint   Patient presents with    Follow-up     Right humerus ORIF nonunion sx 4/1/2019       HISTORY OF PRESENT ILLNESS:  Sunni Garvey is a 37 y.o.  patient here for repeat x-ray evaluation after undergoing right humerus nonunion repair with bone grafting and plating. 6 weeks following surgery. He has been in a cast for protection. He does continue to smoke. He does request Motrin. He states that this does well on his stomach. Patient states the arm pain is minimal, a different pain than preoperatively. He feels that the arm feels more stable now. ROS:  ROS neg     Past medical history is reviewed again today, no changes to report    PHYSICAL EXAMINATION:  Patient is alert and pleasant, in no acute distress. The affected extremity is examined today. Right upper extremity reveals normal alignment. Almost full resolution of swelling. No bruising. Incision is pristine, well-healed without sign of infection or dehiscence. There is some elbow stiffness and wrist stiffness. Intact neurovascular exam right hand with full motion. Palpation of the humerus today does not reveal guarding, instability, compartments soft    X-rays:  2 views of the right humerus are obtained and reviewed, impression:  Maintained alignment of the humerus and hardware. There appears to be some progression of callus formation    IMPRESSION AND PLAN: Right humerus nonunion    X-rays reveal maintained alignment, patient is doing well clinically also. We will continue with our current care plan. Postoperative wound care was discussed with the patient today. Staples were removed without difficulty. Steri-Strips were applied (as long as no allergy) to help prevent wound dehiscence. Appropriate monitoring and care of the wound, including cleansing, was outlined with the patient today.   We discussed appropriate activity limitations and modifications over the next couple weeks to prevent wound complication, such as dehiscence. The patient understands to contact my office if having wound problems. These would include, but not limited to, erythema, new swelling or pain, dehiscence, signs of infection. Patient does not want any further casting. We will transition to a T scope brace and allow motion. I would like the brace until fully healed for some protection of the surgical site. No heavy impact activities at this time. Smoking cessation was recommended and reasoning given once again today. Motrin was ordered per his request.  He will stop if having any side effects. I would like to see him in 6 weeks unless needed sooner. Repeat x-rays right humerus at that time. Procedures    T-Scope Elbow Brace     Patient was prescribed a Breg Elbow T-Scope Brace. The right elbow will require stabilization / immobilization from this semi-rigid / rigid orthosis to improve their function. The orthosis will assist in protecting the affected area, provide functional support and facilitate healing. The prefabricated orthosis was modified in the following manner to provide a customizable fit for the patient at the time of delivery. 1.  Identification of appropriate positioning and alignment of anatomical landmarks. 2.  Trimming of straps and adjustment of frame to fit patient. 3.  Polycentric hinge adjustments in flexion and extension. The patient was educated and fit by a healthcare professional with expert knowledge and specialization in brace application while under the direct supervision of the treating physician. Verbal and written instructions for the use of and application of this item were provided. They were instructed to contact the office immediately should the brace result in increased pain, decreased sensation, increased swelling or worsening of the condition. All questions and concerns were addressed today. Patient is in agreement with the plan.         Elke Dang, MD  Hand & Upper Extremity Surgery  [address] partner of [facility] ([facility])        Please note that this transcription was created using voice recognition software. Any errors are unintentional and may be due to voice recognition transcription.

## 2019-05-16 ENCOUNTER — TELEPHONE (OUTPATIENT)
Dept: ORTHOPEDIC SURGERY | Age: 44
End: 2019-05-16

## 2019-05-16 DIAGNOSIS — F51.01 PRIMARY INSOMNIA: ICD-10-CM

## 2019-05-16 RX ORDER — TRAZODONE HYDROCHLORIDE 100 MG/1
50-100 TABLET ORAL NIGHTLY PRN
Qty: 90 TABLET | Refills: 1 | Status: SHIPPED | OUTPATIENT
Start: 2019-05-16

## 2019-05-16 NOTE — TELEPHONE ENCOUNTER
5/15/19  DME   NO PRECERT REQUIRED - PATIENT HAS MEDICARE AND E-VERIFIED UNTIL 8/5/19 -  PER Epic - NDS

## 2019-05-21 LAB
AFB CULTURE (MYCOBACTERIA): NORMAL
AFB SMEAR: NORMAL

## 2019-05-24 ENCOUNTER — TELEPHONE (OUTPATIENT)
Dept: FAMILY MEDICINE CLINIC | Age: 44
End: 2019-05-24

## 2019-06-20 ENCOUNTER — OFFICE VISIT (OUTPATIENT)
Dept: FAMILY MEDICINE CLINIC | Age: 44
End: 2019-06-20
Payer: MEDICARE

## 2019-06-20 VITALS
OXYGEN SATURATION: 98 % | SYSTOLIC BLOOD PRESSURE: 130 MMHG | HEART RATE: 114 BPM | DIASTOLIC BLOOD PRESSURE: 78 MMHG | RESPIRATION RATE: 24 BRPM | BODY MASS INDEX: 18.6 KG/M2 | WEIGHT: 126 LBS

## 2019-06-20 DIAGNOSIS — F17.210 CIGARETTE NICOTINE DEPENDENCE WITHOUT COMPLICATION: ICD-10-CM

## 2019-06-20 DIAGNOSIS — F51.01 PRIMARY INSOMNIA: ICD-10-CM

## 2019-06-20 DIAGNOSIS — F33.3 SEVERE RECURRENT MAJOR DEPRESSIVE DISORDER WITH PSYCHOTIC FEATURES (HCC): ICD-10-CM

## 2019-06-20 DIAGNOSIS — M54.6 CHRONIC BILATERAL THORACIC BACK PAIN: ICD-10-CM

## 2019-06-20 DIAGNOSIS — G89.29 CHRONIC BILATERAL THORACIC BACK PAIN: ICD-10-CM

## 2019-06-20 DIAGNOSIS — H91.91 HEARING LOSS OF RIGHT EAR, UNSPECIFIED HEARING LOSS TYPE: ICD-10-CM

## 2019-06-20 DIAGNOSIS — J44.9 CHRONIC OBSTRUCTIVE PULMONARY DISEASE, UNSPECIFIED COPD TYPE (HCC): Primary | ICD-10-CM

## 2019-06-20 PROCEDURE — 99214 OFFICE O/P EST MOD 30 MIN: CPT | Performed by: FAMILY MEDICINE

## 2019-06-20 PROCEDURE — 3023F SPIROM DOC REV: CPT | Performed by: FAMILY MEDICINE

## 2019-06-20 PROCEDURE — G8427 DOCREV CUR MEDS BY ELIG CLIN: HCPCS | Performed by: FAMILY MEDICINE

## 2019-06-20 PROCEDURE — 99406 BEHAV CHNG SMOKING 3-10 MIN: CPT | Performed by: FAMILY MEDICINE

## 2019-06-20 PROCEDURE — G8926 SPIRO NO PERF OR DOC: HCPCS | Performed by: FAMILY MEDICINE

## 2019-06-20 PROCEDURE — G8420 CALC BMI NORM PARAMETERS: HCPCS | Performed by: FAMILY MEDICINE

## 2019-06-20 PROCEDURE — 4004F PT TOBACCO SCREEN RCVD TLK: CPT | Performed by: FAMILY MEDICINE

## 2019-06-20 RX ORDER — IBUPROFEN 800 MG/1
800 TABLET ORAL EVERY 8 HOURS PRN
Qty: 60 TABLET | Refills: 5 | Status: SHIPPED | OUTPATIENT
Start: 2019-06-20 | End: 2019-12-26

## 2019-06-20 RX ORDER — BUDESONIDE AND FORMOTEROL FUMARATE DIHYDRATE 160; 4.5 UG/1; UG/1
2 AEROSOL RESPIRATORY (INHALATION) 2 TIMES DAILY
Qty: 1 INHALER | Refills: 5 | Status: SHIPPED | OUTPATIENT
Start: 2019-06-20 | End: 2019-12-30

## 2019-06-20 RX ORDER — ALBUTEROL SULFATE 90 UG/1
2 AEROSOL, METERED RESPIRATORY (INHALATION) EVERY 6 HOURS PRN
Qty: 1 INHALER | Refills: 11 | Status: SHIPPED | OUTPATIENT
Start: 2019-06-20 | End: 2020-06-19

## 2019-06-20 ASSESSMENT — ENCOUNTER SYMPTOMS: BACK PAIN: 1

## 2019-06-20 ASSESSMENT — COPD QUESTIONNAIRES: COPD: 1

## 2019-06-20 NOTE — PROGRESS NOTES
Joselin Tineo is a 37 y.o. male    Chief Complaint   Patient presents with    COPD    Depression    Back Pain    Insomnia    Hearing Problem       HPI:    COPD   This is a chronic problem. The current episode started more than 1 year ago. The problem occurs 2 to 4 times per day. The problem has been gradually worsening. His symptoms are alleviated by beta-agonist. He reports significant improvement on treatment. His past medical history is significant for COPD (diagnosed in [place], [place]). Major depression with psychotic features. This is a chronic condition. Depression is stable. He takes Prozac daily. He also takes Risperdal at night. Helps calm racing thoughts. Used to hear voices and have visual hallucinations but none since being on Risperdal.      Back Pain   This is a chronic problem. The current episode started more than 1 year ago. The problem occurs constantly. The problem is unchanged. The pain is present in the lumbar spine. Associated symptoms include tingling. Risk factors include sedentary lifestyle. Treatments tried: gabapentin. The treatment provided significant relief.      Insomnia, primary. This is a chronic condition. Initially, Trazodone did not help but it has lately been helping. Also taking Risperidone as well.     Nicotine dependence. Smokes 1 PPD for 40 years. No desire to quit smoking. He does have underlying mental illness as noted above, making Chantix a difficult option. Hearing loss. Present in the right ear. This is a chronic condition. This is a new complaint to me. No ringing. He would like a hearing study. ROS:    Review of Systems   HENT: Positive for hearing loss. Negative for tinnitus. Musculoskeletal: Positive for back pain. Psychiatric/Behavioral: Positive for sleep disturbance.        /78   Pulse 114   Resp 24   Wt 126 lb (57.2 kg)   SpO2 98%   BMI 18.60 kg/m²     Physical Exam:    Physical Exam   Constitutional: He appears well-developed. No distress. HENT:   Right Ear: Tympanic membrane normal.   Left Ear: Tympanic membrane normal.   Cardiovascular: Normal rate and regular rhythm. No murmur heard. Pulmonary/Chest: Effort normal and breath sounds normal. No stridor. No respiratory distress. Skin: He is not diaphoretic. Psychiatric: He has a normal mood and affect. Current Outpatient Medications   Medication Sig Dispense Refill    albuterol sulfate  (90 Base) MCG/ACT inhaler Inhale 2 puffs into the lungs every 6 hours as needed for Wheezing 1 Inhaler 11    albuterol (PROVENTIL) (5 MG/ML) 0.5% nebulizer solution Take 1 mL by nebulization 4 times daily as needed for Wheezing 120 each 11    budesonide-formoterol (SYMBICORT) 160-4.5 MCG/ACT AERO Inhale 2 puffs into the lungs 2 times daily 1 Inhaler 5    vitamin D (CHOLECALCIFEROL) 1000 UNIT TABS tablet Take 1 tablet by mouth daily 30 tablet 11    ibuprofen (IBU) 800 MG tablet Take 1 tablet by mouth every 8 hours as needed for Pain (with food) 60 tablet 5    traZODone (DESYREL) 100 MG tablet Take 0.5-1 tablets by mouth nightly as needed for Sleep 90 tablet 1    aspirin 81 MG tablet Take 1 tablet by mouth daily 30 tablet 2    FLUoxetine (PROZAC) 40 MG capsule Take 1 capsule by mouth daily 30 capsule 5    Garlic 10 MG CAPS Take by mouth      buprenorphine (SUBUTEX) 2 MG SUBL SL tablet Place 16 mg under the tongue daily.  Nebulizer MISC by Does not apply route daily      topiramate (TOPAMAX) 50 MG tablet Take 50 mg by mouth daily       donepezil (ARICEPT) 5 MG tablet Take 1 tablet by mouth nightly (Patient taking differently: Take 10 mg by mouth nightly ) 12 tablet 0    gabapentin (NEURONTIN) 600 MG tablet Take 1 tablet by mouth 2 times daily for 2 days. . (Patient taking differently: Take 600 mg by mouth 3 times daily. ) 2 tablet 1     No current facility-administered medications for this visit. Assessment:    1.  Chronic obstructive pulmonary disease, unspecified COPD type ([address]    2. Severe recurrent major depressive disorder with psychotic features ([address]    3. Chronic bilateral thoracic back pain    4. Primary insomnia    5. Hearing loss of right ear, unspecified hearing loss type    6. Cigarette nicotine dependence without complication        Plan:    1. Chronic obstructive pulmonary disease, unspecified COPD type ([address]  Start Symbicort. See Pulmonlogy for oxygen needs and to get sleep study. - budesonide-formoterol (SYMBICORT) 160-4.5 MCG/ACT AERO; Inhale 2 puffs into the lungs 2 times daily  Dispense: 1 Inhaler; Refill: 5  - Malena Vasquez MD, Pulmonary, [facility]    2. Severe recurrent major depressive disorder with psychotic features ([facility]  Stable. Continue current medications. 3. Chronic bilateral thoracic back pain  Discuss taking with food. - ibuprofen (IBU) 800 MG tablet; Take 1 tablet by mouth every 8 hours as needed for Pain (with food)  Dispense: 60 tablet; Refill: 5    4. Primary insomnia  Stable. Continue current medications. 5. Hearing loss of right ear, unspecified hearing loss type  - External Referral To ENT    6. Cigarette nicotine dependence without complication  Patient has absolutely no desire to quit smoking. Discussed the concern with developing cancer and other issues. Patient later called back and requested nicotine patch. Will send in now. - albuterol sulfate  (90 Base) MCG/ACT inhaler; Inhale 2 puffs into the lungs every 6 hours as needed for Wheezing  Dispense: 1 Inhaler; Refill: 11  - [place] TOBACCO USE CESSATION INTERMEDIATE 3-10 MINUTES      Return in about 6 months (around 12/20/2019) for medicare annual (COPD, depression) COPAY.

## 2019-06-24 ENCOUNTER — TELEPHONE (OUTPATIENT)
Dept: FAMILY MEDICINE CLINIC | Age: 44
End: 2019-06-24

## 2019-06-24 DIAGNOSIS — F17.210 CIGARETTE NICOTINE DEPENDENCE WITHOUT COMPLICATION: Primary | ICD-10-CM

## 2019-06-24 RX ORDER — NICOTINE 21 MG/24HR
1 PATCH, TRANSDERMAL 24 HOURS TRANSDERMAL EVERY 24 HOURS
Qty: 30 PATCH | Refills: 3 | Status: SHIPPED | OUTPATIENT
Start: 2019-06-24 | End: 2019-07-23 | Stop reason: CLARIF

## 2019-06-26 ENCOUNTER — OFFICE VISIT (OUTPATIENT)
Dept: ORTHOPEDIC SURGERY | Age: 44
End: 2019-06-26

## 2019-06-26 VITALS — HEIGHT: 69 IN | BODY MASS INDEX: 18.68 KG/M2 | WEIGHT: 126.1 LBS

## 2019-06-26 DIAGNOSIS — S42.351K CLOSED DISPLACED COMMINUTED FRACTURE OF SHAFT OF RIGHT HUMERUS WITH NONUNION: Primary | ICD-10-CM

## 2019-06-26 PROCEDURE — 99024 POSTOP FOLLOW-UP VISIT: CPT | Performed by: ORTHOPAEDIC SURGERY

## 2019-06-26 NOTE — PROGRESS NOTES
Chief Complaint   Patient presents with    Follow-up     Right humerus ORIF nonunion sx 4/1/2019       HISTORY OF PRESENT ILLNESS:  Johnny Robertson is a 37 y.o.  patient here for repeat x-ray evaluation after sustaining a right humerus nonunion, now just under 2 months following ORIF with bone grafting. Patient is still smoking 1 pack/day. Patient states he has been compliant with brace use. Patient denies pain. Patient states he has been wearing his brace. He denies numbness. ROS:  ROS neg     Past medical history is reviewed again today, no changes to report    PHYSICAL EXAMINATION:  Patient is alert and pleasant, in no acute distress. The affected extremity is examined today. Incision is well-healed. Swelling is very minimal.  No wound problems. No pain over the humerus with palpation. Elbow stiffness is noted. Full right shoulder motion. Full motion right hand and right wrist.  Intact neurovascular exam right hand    X-rays:  2 views of the right humerus are obtained and reviewed, impression: Fracture line is still visible on the AP view but there appears to be some early callus formation. Fracture much less visible on the lateral view. Hardware is intact without screw back out or breakage. IMPRESSION AND PLAN: Right humerus nonunion    X-rays reveal maintained alignment, patient is doing well clinically also. There does not appear to be full fracture healing at this point, however there appears to be callus formation in our screws and hardware has not failed. We will continue with our current care plan. Smoking cessation recommended. Patient will be out of the brace except for unsafe activities. We discussed some range of motion and softening of the soft tissues for elbow motion gains. He would like to do home therapy only. He will follow-up with me in 3 months with x-rays of the right humerus.   If he is having new pain swelling or a feeling of instability, he will contact me sooner. All questions and concerns were addressed today. Patient is in agreement with the plan. Ulysses Loco MD  Hand & Upper Extremity Surgery  [address]  A partner of [facility] ([facility])        Please note that this transcription was created using voice recognition software. Any errors are unintentional and may be due to voice recognition transcription.

## 2019-07-01 ENCOUNTER — OFFICE VISIT (OUTPATIENT)
Dept: FAMILY MEDICINE CLINIC | Age: 44
End: 2019-07-01
Payer: MEDICARE

## 2019-07-01 VITALS
HEIGHT: 69 IN | SYSTOLIC BLOOD PRESSURE: 130 MMHG | DIASTOLIC BLOOD PRESSURE: 66 MMHG | HEART RATE: 100 BPM | BODY MASS INDEX: 18.51 KG/M2 | OXYGEN SATURATION: 98 % | WEIGHT: 125 LBS | RESPIRATION RATE: 20 BRPM

## 2019-07-01 DIAGNOSIS — G89.29 CHRONIC BILATERAL THORACIC BACK PAIN: ICD-10-CM

## 2019-07-01 DIAGNOSIS — F17.210 CIGARETTE NICOTINE DEPENDENCE WITHOUT COMPLICATION: ICD-10-CM

## 2019-07-01 DIAGNOSIS — M54.6 CHRONIC BILATERAL THORACIC BACK PAIN: ICD-10-CM

## 2019-07-01 DIAGNOSIS — Z00.00 ROUTINE GENERAL MEDICAL EXAMINATION AT A HEALTH CARE FACILITY: Primary | ICD-10-CM

## 2019-07-01 DIAGNOSIS — J44.9 CHRONIC OBSTRUCTIVE PULMONARY DISEASE, UNSPECIFIED COPD TYPE (HCC): ICD-10-CM

## 2019-07-01 PROCEDURE — G0438 PPPS, INITIAL VISIT: HCPCS | Performed by: FAMILY MEDICINE

## 2019-07-01 PROCEDURE — 4004F PT TOBACCO SCREEN RCVD TLK: CPT | Performed by: FAMILY MEDICINE

## 2019-07-01 PROCEDURE — 99406 BEHAV CHNG SMOKING 3-10 MIN: CPT | Performed by: FAMILY MEDICINE

## 2019-07-01 PROCEDURE — 99213 OFFICE O/P EST LOW 20 MIN: CPT | Performed by: FAMILY MEDICINE

## 2019-07-01 PROCEDURE — G8427 DOCREV CUR MEDS BY ELIG CLIN: HCPCS | Performed by: FAMILY MEDICINE

## 2019-07-01 PROCEDURE — G8926 SPIRO NO PERF OR DOC: HCPCS | Performed by: FAMILY MEDICINE

## 2019-07-01 PROCEDURE — G8419 CALC BMI OUT NRM PARAM NOF/U: HCPCS | Performed by: FAMILY MEDICINE

## 2019-07-01 PROCEDURE — 3023F SPIROM DOC REV: CPT | Performed by: FAMILY MEDICINE

## 2019-07-01 ASSESSMENT — ANXIETY QUESTIONNAIRES: GAD7 TOTAL SCORE: 2

## 2019-07-01 ASSESSMENT — PATIENT HEALTH QUESTIONNAIRE - PHQ9
SUM OF ALL RESPONSES TO PHQ QUESTIONS 1-9: 0
SUM OF ALL RESPONSES TO PHQ QUESTIONS 1-9: 0

## 2019-07-01 ASSESSMENT — ENCOUNTER SYMPTOMS: BACK PAIN: 1

## 2019-07-01 ASSESSMENT — LIFESTYLE VARIABLES: HOW OFTEN DO YOU HAVE A DRINK CONTAINING ALCOHOL: 0

## 2019-07-01 NOTE — PROGRESS NOTES
Nestor Tam is a 37 y.o. male    Chief Complaint   Patient presents with    Medicare AWV    Back Pain    Discuss Medications  Tobacco abuse       HPI:     Back Pain    This is a chronic problem. The current episode started more than 1 year ago. The problem occurs constantly. The problem has been waxing and waning since onset. The pain is present in the lumbar spine. Pertinent negatives include no tingling. He has tried NSAIDs for the symptoms. The treatment provided mild relief. Nicotine dependence. Smokes 1 PPD for 40 years. No desire to quit smoking. He does have underlying mental illness as noted above, making Chantix a difficult option. He did not do well with the nicotine patches recently sent. Would like to try the gum now. COPD is stable. Needs albuterol sent to another pharmacy. ROS:    Review of Systems   Musculoskeletal: Positive for back pain. Neurological: Negative for tingling. /66   Pulse 100   Resp 20   Ht 5' 9\" (1.753 m)   Wt 125 lb (56.7 kg)   SpO2 98%   BMI 18.46 kg/m²      Physical Exam:    Physical Exam   Constitutional: He appears well-developed. No distress. Musculoskeletal:        Lumbar back: He exhibits tenderness. Skin: He is not diaphoretic. Psychiatric: He has a normal mood and affect.  His behavior is normal.       Current Outpatient Medications   Medication Sig Dispense Refill    albuterol (PROVENTIL) (5 MG/ML) 0.5% nebulizer solution Take 1 mL by nebulization 4 times daily as needed for Wheezing 120 each 11    nicotine polacrilex (NICORETTE) 2 MG gum Take 1 each by mouth as needed for Smoking cessation 100 each 5    nicotine (NICODERM CQ) 21 MG/24HR Place 1 patch onto the skin every 24 hours 30 patch 3    albuterol sulfate  (90 Base) MCG/ACT inhaler Inhale 2 puffs into the lungs every 6 hours as needed for Wheezing 1 Inhaler 11    budesonide-formoterol (SYMBICORT) 160-4.5 MCG/ACT AERO Inhale 2 puffs into the lungs 2 times daily 1

## 2019-07-01 NOTE — PATIENT INSTRUCTIONS
Personalized Preventive Plan for Dominic Rodriguez - 7/1/2019  Medicare offers a range of preventive health benefits. Some of the tests and screenings are paid in full while other may be subject to a deductible, co-insurance, and/or copay. Some of these benefits include a comprehensive review of your medical history including lifestyle, illnesses that may run in your family, and various assessments and screenings as appropriate. After reviewing your medical record and screening and assessments performed today your provider may have ordered immunizations, labs, imaging, and/or referrals for you. A list of these orders (if applicable) as well as your Preventive Care list are included within your After Visit Summary for your review. Other Preventive Recommendations:    · A preventive eye exam performed by an eye specialist is recommended every 1-2 years to screen for glaucoma; cataracts, macular degeneration, and other eye disorders. · A preventive dental visit is recommended every 6 months. · Try to get at least 150 minutes of exercise per week or 10,000 steps per day on a pedometer . · Order or download the FREE \"Exercise & Physical Activity: Your Everyday Guide\" from [employer]. Call 6-743.804.3759 or search [employer] online. · You need 0300-1744 mg of calcium and 0135-9113 IU of vitamin D per day. It is possible to meet your calcium requirement with diet alone, but a vitamin D supplement is usually necessary to meet this goal.  · When exposed to the sun, use a sunscreen that protects against both UVA and UVB radiation with an SPF of 30 or greater. Reapply every 2 to 3 hours or after sweating, drying off with a towel, or swimming. · Always wear a seat belt when traveling in a car. Always wear a helmet when riding a bicycle or motorcycle.

## 2019-07-02 ENCOUNTER — TELEPHONE (OUTPATIENT)
Dept: FAMILY MEDICINE CLINIC | Age: 44
End: 2019-07-02

## 2019-07-02 DIAGNOSIS — J44.9 CHRONIC OBSTRUCTIVE PULMONARY DISEASE, UNSPECIFIED COPD TYPE (HCC): Primary | ICD-10-CM

## 2019-07-02 RX ORDER — ALBUTEROL SULFATE 2.5 MG/3ML
2.5 SOLUTION RESPIRATORY (INHALATION) EVERY 6 HOURS PRN
Qty: 120 EACH | Refills: 11 | Status: SHIPPED | OUTPATIENT
Start: 2019-07-02 | End: 2019-07-02 | Stop reason: SDUPTHER

## 2019-07-02 RX ORDER — ALBUTEROL SULFATE 2.5 MG/3ML
2.5 SOLUTION RESPIRATORY (INHALATION) EVERY 6 HOURS PRN
Qty: 125 EACH | Refills: 11 | Status: SHIPPED | OUTPATIENT
Start: 2019-07-02 | End: 2019-07-23 | Stop reason: CLARIF

## 2019-07-18 ENCOUNTER — TELEPHONE (OUTPATIENT)
Dept: FAMILY MEDICINE CLINIC | Age: 44
End: 2019-07-18

## 2019-07-23 ENCOUNTER — OFFICE VISIT (OUTPATIENT)
Dept: PULMONOLOGY | Age: 44
End: 2019-07-23
Payer: MEDICARE

## 2019-07-23 VITALS
TEMPERATURE: 98.3 F | WEIGHT: 124 LBS | SYSTOLIC BLOOD PRESSURE: 111 MMHG | HEIGHT: 69 IN | OXYGEN SATURATION: 95 % | RESPIRATION RATE: 16 BRPM | DIASTOLIC BLOOD PRESSURE: 69 MMHG | BODY MASS INDEX: 18.37 KG/M2 | HEART RATE: 89 BPM

## 2019-07-23 DIAGNOSIS — J31.0 CHRONIC RHINITIS: ICD-10-CM

## 2019-07-23 DIAGNOSIS — J41.0 SIMPLE CHRONIC BRONCHITIS (HCC): ICD-10-CM

## 2019-07-23 DIAGNOSIS — G47.30 OBSERVED SLEEP APNEA: ICD-10-CM

## 2019-07-23 DIAGNOSIS — F17.200 CURRENT SMOKER: ICD-10-CM

## 2019-07-23 PROCEDURE — G8427 DOCREV CUR MEDS BY ELIG CLIN: HCPCS | Performed by: INTERNAL MEDICINE

## 2019-07-23 PROCEDURE — 4004F PT TOBACCO SCREEN RCVD TLK: CPT | Performed by: INTERNAL MEDICINE

## 2019-07-23 PROCEDURE — G8926 SPIRO NO PERF OR DOC: HCPCS | Performed by: INTERNAL MEDICINE

## 2019-07-23 PROCEDURE — 94664 DEMO&/EVAL PT USE INHALER: CPT | Performed by: INTERNAL MEDICINE

## 2019-07-23 PROCEDURE — 99203 OFFICE O/P NEW LOW 30 MIN: CPT | Performed by: INTERNAL MEDICINE

## 2019-07-23 PROCEDURE — 3023F SPIROM DOC REV: CPT | Performed by: INTERNAL MEDICINE

## 2019-07-23 PROCEDURE — G8419 CALC BMI OUT NRM PARAM NOF/U: HCPCS | Performed by: INTERNAL MEDICINE

## 2019-07-23 RX ORDER — ASPIRIN 325 MG
325 TABLET ORAL DAILY
COMMUNITY

## 2019-07-23 RX ORDER — FLUTICASONE PROPIONATE 50 MCG
1 SPRAY, SUSPENSION (ML) NASAL DAILY
Qty: 2 BOTTLE | Refills: 1 | Status: SHIPPED | OUTPATIENT
Start: 2019-07-23 | End: 2022-05-11 | Stop reason: SDUPTHER

## 2019-07-23 ASSESSMENT — SLEEP AND FATIGUE QUESTIONNAIRES
HOW LIKELY ARE YOU TO NOD OFF OR FALL ASLEEP WHILE SITTING INACTIVE IN A PUBLIC PLACE: 1
HOW LIKELY ARE YOU TO NOD OFF OR FALL ASLEEP WHILE WATCHING TV: 1
NECK CIRCUMFERENCE (INCHES): 14.5
HOW LIKELY ARE YOU TO NOD OFF OR FALL ASLEEP WHILE SITTING AND TALKING TO SOMEONE: 0
ESS TOTAL SCORE: 2
HOW LIKELY ARE YOU TO NOD OFF OR FALL ASLEEP WHILE SITTING QUIETLY AFTER LUNCH WITHOUT ALCOHOL: 0
HOW LIKELY ARE YOU TO NOD OFF OR FALL ASLEEP IN A CAR, WHILE STOPPED FOR A FEW MINUTES IN TRAFFIC: 0
HOW LIKELY ARE YOU TO NOD OFF OR FALL ASLEEP WHEN YOU ARE A PASSENGER IN A CAR FOR AN HOUR WITHOUT A BREAK: 0
HOW LIKELY ARE YOU TO NOD OFF OR FALL ASLEEP WHILE LYING DOWN TO REST IN THE AFTERNOON WHEN CIRCUMSTANCES PERMIT: 0
HOW LIKELY ARE YOU TO NOD OFF OR FALL ASLEEP WHILE SITTING AND READING: 0

## 2019-07-23 NOTE — PATIENT INSTRUCTIONS
Remember to bring all pulmonary medications to your next appointment with the office. Please keep all of your future appointments scheduled by [facility] Pulmonary office. Out of respect for other patients and providers, you may be asked to reschedule your appointment if you arrive later than your scheduled appointment time. Appointments cancelled less than 24hrs in advance will be considered a no show. Patients with three missed appointments within 1 year or four missed appointments within 2 years can be dismissed from the practice. You may receive a survey regarding the care you received during your visit. Your input is valuable to us. We encourage you to complete and return your survey. We hope you will choose us in the future for your healthcare needs. The [facility] at [address] [place] [address], [address] [place] [address]                      Phone: [phone] Fax: [phone]      Your appointment for a sleep study is scheduled on Wednesday, 8/21/19 at 8:30pm. Please arrive at the [facility] at the time indicated. On Saturday and Sunday night a sleep tech will come down to let you in the building and escort you upstairs to the sleep center; please call from the parking lot if no one is at the door when you arrive. PLEASE DO NOT ARRIVE TO THE SLEEP CENTER ANY EARLIER THAN 8:30PM AS THERE IS NO STAFF ON DUTY AND THE DOORS WILL BE LOCKED    IMPORTANT: We ask that you please phone the [address] [place] [address] Patient Pre-Services ([phone]) at least 3-5 days prior to your sleep study to pre-register. Failing to pre-register may ultimately cause your insurance to not pay for this procedure. Please be aware that [address] [place] [address] is a non-smoking facility. There is no smoking allowed anywhere on [place] property at any time. Each patient room is a private room with cable television, WiFi and a private bathroom with shower facilities.     The [phone]    [phone]   [address] [facility]  [address]   3[address] [facility] [phone] [place] [facility]  [address] [phone] 1025 [facility]. [place], [facility]   [place] [address], [address]   [address] [phone] or  [phone] [address], [address]  ([address]) [address], [address]   [address] [phone] opt4 opt2 [phone]    [facility] Surgical [phone] [address] [facility] [address] [place] [address] Avenue, [address] [facility]   [place]  ([address])  **For Astral or Trelegy s/w [place] or [employer] [phone] or  [phone]  [facility]     [address] [place], [address]       [place] [phone] [address], [address], [address]   [address] [place] [address] [phone] [facility] 43, [address] [place] [address] [address] 0660 [phone] [address] [place] [address], [facility] 27   [address] [phone] [facility], [address] [facility] [address] [phone] [address] [place]   [address]  ([address] [phone] [facility] 1823 1461 [address]  [address]   [place]  ([address]) 21  [address] [address] [facility] [phone]    [employer]   (portable O2 concentrators) [phone]    [address]  (No sleep equipment) [phone] [facility] 39, 9851 [facility]   [address] [place] [address] [phone]
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
## 2019-07-29 ENCOUNTER — HOSPITAL ENCOUNTER (OUTPATIENT)
Dept: SLEEP CENTER | Age: 44
Discharge: HOME | End: 2019-07-31
Payer: MEDICARE

## 2019-07-29 DIAGNOSIS — G47.30 OBSERVED SLEEP APNEA: ICD-10-CM

## 2019-07-29 PROCEDURE — 95810 POLYSOM 6/> YRS 4/> PARAM: CPT

## 2019-08-28 ENCOUNTER — TELEPHONE (OUTPATIENT)
Dept: PULMONOLOGY | Age: 44
End: 2019-08-28

## 2019-08-28 NOTE — TELEPHONE ENCOUNTER
Patient did not show for PSG/PFT Follow Up appointment  with Dr. Kyra Matias on 8/28/19    Same Day Cancellation: No    Patient rescheduled:  No    New appointment:     Patient was also no show on: N/A    LOV 7/23/19

## 2019-08-29 ENCOUNTER — HOSPITAL ENCOUNTER (OUTPATIENT)
Dept: PULMONOLOGY | Age: 44
Discharge: HOME | End: 2019-08-29
Payer: MEDICARE

## 2019-08-29 ENCOUNTER — OFFICE VISIT (OUTPATIENT)
Dept: PULMONOLOGY | Age: 44
End: 2019-08-29
Payer: MEDICARE

## 2019-08-29 VITALS
SYSTOLIC BLOOD PRESSURE: 108 MMHG | DIASTOLIC BLOOD PRESSURE: 72 MMHG | BODY MASS INDEX: 18.37 KG/M2 | HEART RATE: 102 BPM | WEIGHT: 124 LBS | HEIGHT: 69 IN | OXYGEN SATURATION: 98 %

## 2019-08-29 DIAGNOSIS — J41.0 SIMPLE CHRONIC BRONCHITIS (HCC): Primary | ICD-10-CM

## 2019-08-29 DIAGNOSIS — J31.0 CHRONIC RHINITIS: ICD-10-CM

## 2019-08-29 DIAGNOSIS — J41.0 SIMPLE CHRONIC BRONCHITIS (HCC): ICD-10-CM

## 2019-08-29 DIAGNOSIS — F17.200 CURRENT SMOKER: ICD-10-CM

## 2019-08-29 LAB
DLCO %PRED: 30 %
DLCO PRED: NORMAL ML/MIN/MMHG
DLCO/VA %PRED: NORMAL %
DLCO/VA PRED: NORMAL ML/MIN/MMHG
DLCO/VA: NORMAL ML/MIN/MMHG
DLCO: NORMAL ML/MIN/MMHG
EXPIRATORY TIME-POST: NORMAL SEC
EXPIRATORY TIME: NORMAL SEC
FEF 25-75% %CHNG: NORMAL
FEF 25-75% %PRED-POST: NORMAL %
FEF 25-75% %PRED-PRE: NORMAL L/SEC
FEF 25-75% PRED: NORMAL L/SEC
FEF 25-75%-POST: NORMAL L/SEC
FEF 25-75%-PRE: NORMAL L/SEC
FEV1 %PRED-POST: 34 %
FEV1 %PRED-PRE: 35 %
FEV1 PRED: NORMAL L
FEV1-POST: NORMAL L
FEV1-PRE: NORMAL L
FEV1/FVC %PRED-POST: NORMAL %
FEV1/FVC %PRED-PRE: NORMAL %
FEV1/FVC PRED: NORMAL %
FEV1/FVC-POST: 44 %
FEV1/FVC-PRE: 46 %
FVC %PRED-POST: NORMAL L
FVC %PRED-PRE: NORMAL %
FVC PRED: NORMAL L
FVC-POST: NORMAL L
FVC-PRE: NORMAL L
GAW %PRED: NORMAL %
GAW PRED: NORMAL L/S/CMH2O
GAW: NORMAL L/S/CMH2O
IC %PRED: NORMAL %
IC PRED: NORMAL L
IC: NORMAL L
MEP: NORMAL
MIP: NORMAL
MVV %PRED-PRE: NORMAL %
MVV PRED: NORMAL L/MIN
MVV-PRE: NORMAL L/MIN
PEF %PRED-POST: NORMAL %
PEF %PRED-PRE: NORMAL L/SEC
PEF PRED: NORMAL L/SEC
PEF%CHNG: NORMAL
PEF-POST: NORMAL L/SEC
PEF-PRE: NORMAL L/SEC
RAW %PRED: NORMAL %
RAW PRED: NORMAL CMH2O/L/S
RAW: NORMAL CMH2O/L/S
RV %PRED: NORMAL %
RV PRED: NORMAL L
RV: NORMAL L
SVC %PRED: NORMAL %
SVC PRED: NORMAL L
SVC: NORMAL L
TLC %PRED: 71 %
TLC PRED: NORMAL L
TLC: NORMAL L
VA %PRED: NORMAL %
VA PRED: NORMAL L
VA: NORMAL L
VTG %PRED: NORMAL %
VTG PRED: NORMAL L
VTG: NORMAL L

## 2019-08-29 PROCEDURE — 4004F PT TOBACCO SCREEN RCVD TLK: CPT | Performed by: INTERNAL MEDICINE

## 2019-08-29 PROCEDURE — 3023F SPIROM DOC REV: CPT | Performed by: INTERNAL MEDICINE

## 2019-08-29 PROCEDURE — 99214 OFFICE O/P EST MOD 30 MIN: CPT | Performed by: INTERNAL MEDICINE

## 2019-08-29 PROCEDURE — 6370000000 HC RX 637 (ALT 250 FOR IP): Performed by: INTERNAL MEDICINE

## 2019-08-29 PROCEDURE — 94618 PULMONARY STRESS TESTING: CPT

## 2019-08-29 PROCEDURE — G8427 DOCREV CUR MEDS BY ELIG CLIN: HCPCS | Performed by: INTERNAL MEDICINE

## 2019-08-29 PROCEDURE — 94060 EVALUATION OF WHEEZING: CPT

## 2019-08-29 PROCEDURE — 94726 PLETHYSMOGRAPHY LUNG VOLUMES: CPT

## 2019-08-29 PROCEDURE — G8925 SPIR FEV1/FVC>=60% & NO COPD: HCPCS | Performed by: INTERNAL MEDICINE

## 2019-08-29 PROCEDURE — G8419 CALC BMI OUT NRM PARAM NOF/U: HCPCS | Performed by: INTERNAL MEDICINE

## 2019-08-29 PROCEDURE — 94729 DIFFUSING CAPACITY: CPT

## 2019-08-29 RX ORDER — ALBUTEROL SULFATE 90 UG/1
4 AEROSOL, METERED RESPIRATORY (INHALATION) ONCE
Status: COMPLETED | OUTPATIENT
Start: 2019-08-29 | End: 2019-08-29

## 2019-08-29 RX ADMIN — Medication 4 PUFF: at 09:51

## 2019-08-29 ASSESSMENT — SLEEP AND FATIGUE QUESTIONNAIRES
HOW LIKELY ARE YOU TO NOD OFF OR FALL ASLEEP WHILE SITTING AND TALKING TO SOMEONE: 0
HOW LIKELY ARE YOU TO NOD OFF OR FALL ASLEEP WHILE WATCHING TV: 1
HOW LIKELY ARE YOU TO NOD OFF OR FALL ASLEEP WHEN YOU ARE A PASSENGER IN A CAR FOR AN HOUR WITHOUT A BREAK: 0
HOW LIKELY ARE YOU TO NOD OFF OR FALL ASLEEP WHILE LYING DOWN TO REST IN THE AFTERNOON WHEN CIRCUMSTANCES PERMIT: 3
HOW LIKELY ARE YOU TO NOD OFF OR FALL ASLEEP WHILE SITTING AND READING: 2
ESS TOTAL SCORE: 11
HOW LIKELY ARE YOU TO NOD OFF OR FALL ASLEEP WHILE SITTING INACTIVE IN A PUBLIC PLACE: 3
NECK CIRCUMFERENCE (INCHES): 14.5
HOW LIKELY ARE YOU TO NOD OFF OR FALL ASLEEP IN A CAR, WHILE STOPPED FOR A FEW MINUTES IN TRAFFIC: 0
HOW LIKELY ARE YOU TO NOD OFF OR FALL ASLEEP WHILE SITTING QUIETLY AFTER LUNCH WITHOUT ALCOHOL: 2

## 2019-08-29 ASSESSMENT — PULMONARY FUNCTION TESTS
FEV1_PERCENT_PREDICTED_POST: 34
FEV1_PERCENT_PREDICTED_PRE: 35
FEV1/FVC_POST: 44
FEV1/FVC_PRE: 46

## 2019-08-29 NOTE — PROGRESS NOTES
Medical History:   Diagnosis Date    Arthritis     Back disorder     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    Back pain     missing disc    COPD [facility]ch[facility] obstructive pulmonary disease) ([facility])     Hypoglycemia, unspecified     Knee instability     left knee unstable    Memory loss     Panic attacks     Psychiatric problem     Unspecified cerebral artery occlusion with cerebral infarction 2014    no weakness or speech problems; pt has dementia       Past Surgical History:   Procedure Laterality Date    HUMERUS FRACTURE SURGERY Right 4/1/2019    OPEN 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       CHECKPOINT; BIOMET performed by Lito Garcia MD at [address] [facility]   Allergen Reactions    Mustard Seed Swelling     Throat closes up       Medication list was reviewed and updated as needed in [facility]    Social History     Socioeconomic History    Marital status:       Spouse name: Eduin Puckett Number of children: 0    Years of education: 15    Highest education level: Not on file   Occupational History    Not on file   Social Needs    Financial resource strain: Not on file    Food insecurity:     Worry: Not on file     Inability: Not on file    Transportation needs:     Medical: Not on file     Non-medical: Not on file   Tobacco Use    Smoking status: Current Every Day Smoker     Packs/day: 1.00     Years: 35.00     Pack years: 35.00     Types: Cigarettes    Smokeless tobacco: Never Used    Tobacco comment: 7/23/19 - smokes 1-1.5 ppd   Substance and Sexual Activity    Alcohol use: Yes     Comment: socially; 1-2 drinks per month    Drug use: Yes     Types: Marijuana     Comment: Quit using heroin 4 months ago    Sexual activity: Yes     Partners: Female   Lifestyle    Physical activity:     Days per week: Not on file     Minutes per session: Not on file    Stress: Not on file   Relationships    Social connections:     Talks on phone: Not on file     Gets together: Not on file

## 2019-09-11 DIAGNOSIS — G89.29 CHRONIC BILATERAL THORACIC BACK PAIN: ICD-10-CM

## 2019-09-11 DIAGNOSIS — M54.6 CHRONIC BILATERAL THORACIC BACK PAIN: ICD-10-CM

## 2019-09-12 RX ORDER — GABAPENTIN 600 MG/1
TABLET ORAL
Qty: 110 TABLET | OUTPATIENT
Start: 2019-09-12

## 2019-09-16 ENCOUNTER — HOSPITAL ENCOUNTER (EMERGENCY)
Age: 44
Discharge: HOME | End: 2019-09-17
Attending: EMERGENCY MEDICINE
Payer: MEDICARE

## 2019-09-16 VITALS
RESPIRATION RATE: 18 BRPM | BODY MASS INDEX: 18.51 KG/M2 | DIASTOLIC BLOOD PRESSURE: 94 MMHG | TEMPERATURE: 98.3 F | WEIGHT: 125 LBS | HEIGHT: 69 IN | OXYGEN SATURATION: 100 % | SYSTOLIC BLOOD PRESSURE: 128 MMHG | HEART RATE: 88 BPM

## 2019-09-16 DIAGNOSIS — L03.311 CELLULITIS OF ABDOMINAL WALL: Primary | ICD-10-CM

## 2019-09-16 PROCEDURE — 99282 EMERGENCY DEPT VISIT SF MDM: CPT

## 2019-09-16 ASSESSMENT — PAIN SCALES - GENERAL: PAINLEVEL_OUTOF10: 8

## 2019-09-17 PROCEDURE — 6370000000 HC RX 637 (ALT 250 FOR IP): Performed by: EMERGENCY MEDICINE

## 2019-09-17 RX ORDER — CEPHALEXIN 500 MG/1
500 CAPSULE ORAL 3 TIMES DAILY
Qty: 21 CAPSULE | Refills: 0 | Status: SHIPPED | OUTPATIENT
Start: 2019-09-17 | End: 2019-09-24

## 2019-09-17 RX ORDER — CEPHALEXIN 500 MG/1
500 CAPSULE ORAL ONCE
Status: COMPLETED | OUTPATIENT
Start: 2019-09-17 | End: 2019-09-17

## 2019-09-17 RX ORDER — SULFAMETHOXAZOLE AND TRIMETHOPRIM 800; 160 MG/1; MG/1
1 TABLET ORAL 2 TIMES DAILY
Qty: 14 TABLET | Refills: 0 | Status: SHIPPED | OUTPATIENT
Start: 2019-09-17 | End: 2019-09-24

## 2019-09-17 RX ORDER — SULFAMETHOXAZOLE AND TRIMETHOPRIM 800; 160 MG/1; MG/1
1 TABLET ORAL ONCE
Status: COMPLETED | OUTPATIENT
Start: 2019-09-17 | End: 2019-09-17

## 2019-09-17 RX ADMIN — CEPHALEXIN 500 MG: 500 CAPSULE ORAL at 00:25

## 2019-09-17 RX ADMIN — SULFAMETHOXAZOLE AND TRIMETHOPRIM 1 TABLET: 800; 160 TABLET ORAL at 00:25

## 2019-09-17 NOTE — ED NOTES
Report given to [facility].       [facility]  09/16/19 [id number]

## 2019-09-25 ENCOUNTER — OFFICE VISIT (OUTPATIENT)
Dept: ORTHOPEDIC SURGERY | Age: 44
End: 2019-09-25
Payer: MEDICARE

## 2019-09-25 VITALS — HEIGHT: 69 IN | BODY MASS INDEX: 18.51 KG/M2 | WEIGHT: 125 LBS

## 2019-09-25 DIAGNOSIS — M79.601 RIGHT ARM PAIN: Primary | ICD-10-CM

## 2019-09-25 DIAGNOSIS — S42.351K CLOSED DISPLACED COMMINUTED FRACTURE OF SHAFT OF RIGHT HUMERUS WITH NONUNION: ICD-10-CM

## 2019-09-25 PROCEDURE — G8419 CALC BMI OUT NRM PARAM NOF/U: HCPCS | Performed by: ORTHOPAEDIC SURGERY

## 2019-09-25 PROCEDURE — 4004F PT TOBACCO SCREEN RCVD TLK: CPT | Performed by: ORTHOPAEDIC SURGERY

## 2019-09-25 PROCEDURE — 99213 OFFICE O/P EST LOW 20 MIN: CPT | Performed by: ORTHOPAEDIC SURGERY

## 2019-09-25 PROCEDURE — G8427 DOCREV CUR MEDS BY ELIG CLIN: HCPCS | Performed by: ORTHOPAEDIC SURGERY

## 2019-10-07 ENCOUNTER — OFFICE VISIT (OUTPATIENT)
Dept: FAMILY MEDICINE CLINIC | Age: 44
End: 2019-10-07
Payer: MEDICARE

## 2019-10-07 VITALS — DIASTOLIC BLOOD PRESSURE: 62 MMHG | HEART RATE: 100 BPM | SYSTOLIC BLOOD PRESSURE: 116 MMHG | OXYGEN SATURATION: 98 %

## 2019-10-07 DIAGNOSIS — G89.29 CHRONIC BILATERAL THORACIC BACK PAIN: Primary | ICD-10-CM

## 2019-10-07 DIAGNOSIS — M54.6 CHRONIC BILATERAL THORACIC BACK PAIN: Primary | ICD-10-CM

## 2019-10-07 PROCEDURE — G8484 FLU IMMUNIZE NO ADMIN: HCPCS | Performed by: FAMILY MEDICINE

## 2019-10-07 PROCEDURE — 4004F PT TOBACCO SCREEN RCVD TLK: CPT | Performed by: FAMILY MEDICINE

## 2019-10-07 PROCEDURE — G8427 DOCREV CUR MEDS BY ELIG CLIN: HCPCS | Performed by: FAMILY MEDICINE

## 2019-10-07 PROCEDURE — G8419 CALC BMI OUT NRM PARAM NOF/U: HCPCS | Performed by: FAMILY MEDICINE

## 2019-10-07 PROCEDURE — 99213 OFFICE O/P EST LOW 20 MIN: CPT | Performed by: FAMILY MEDICINE

## 2019-10-07 RX ORDER — GABAPENTIN 600 MG/1
1200 TABLET ORAL 3 TIMES DAILY PRN
Qty: 180 TABLET | Refills: 2 | Status: SHIPPED | OUTPATIENT
Start: 2019-10-07 | End: 2019-12-31

## 2019-10-07 ASSESSMENT — ENCOUNTER SYMPTOMS: BACK PAIN: 1

## 2019-12-03 ENCOUNTER — OFFICE VISIT (OUTPATIENT)
Dept: FAMILY MEDICINE CLINIC | Age: 44
End: 2019-12-03
Payer: MEDICARE

## 2019-12-03 VITALS
OXYGEN SATURATION: 94 % | DIASTOLIC BLOOD PRESSURE: 82 MMHG | WEIGHT: 131 LBS | HEIGHT: 68 IN | HEART RATE: 100 BPM | BODY MASS INDEX: 19.85 KG/M2 | SYSTOLIC BLOOD PRESSURE: 130 MMHG

## 2019-12-03 DIAGNOSIS — Z00.00 ROUTINE GENERAL MEDICAL EXAMINATION AT A HEALTH CARE FACILITY: Primary | ICD-10-CM

## 2019-12-03 DIAGNOSIS — F33.3 SEVERE RECURRENT MAJOR DEPRESSIVE DISORDER WITH PSYCHOTIC FEATURES (HCC): ICD-10-CM

## 2019-12-03 DIAGNOSIS — J44.9 CHRONIC OBSTRUCTIVE PULMONARY DISEASE, UNSPECIFIED COPD TYPE (HCC): ICD-10-CM

## 2019-12-03 DIAGNOSIS — G89.29 CHRONIC BILATERAL THORACIC BACK PAIN: ICD-10-CM

## 2019-12-03 DIAGNOSIS — Z23 NEED FOR TDAP VACCINATION: ICD-10-CM

## 2019-12-03 DIAGNOSIS — F17.210 CIGARETTE NICOTINE DEPENDENCE WITHOUT COMPLICATION: ICD-10-CM

## 2019-12-03 DIAGNOSIS — M54.6 CHRONIC BILATERAL THORACIC BACK PAIN: ICD-10-CM

## 2019-12-03 PROCEDURE — 90471 IMMUNIZATION ADMIN: CPT | Performed by: FAMILY MEDICINE

## 2019-12-03 PROCEDURE — 4004F PT TOBACCO SCREEN RCVD TLK: CPT | Performed by: FAMILY MEDICINE

## 2019-12-03 PROCEDURE — G8484 FLU IMMUNIZE NO ADMIN: HCPCS | Performed by: FAMILY MEDICINE

## 2019-12-03 PROCEDURE — G8420 CALC BMI NORM PARAMETERS: HCPCS | Performed by: FAMILY MEDICINE

## 2019-12-03 PROCEDURE — 90715 TDAP VACCINE 7 YRS/> IM: CPT | Performed by: FAMILY MEDICINE

## 2019-12-03 PROCEDURE — G8926 SPIRO NO PERF OR DOC: HCPCS | Performed by: FAMILY MEDICINE

## 2019-12-03 PROCEDURE — 99214 OFFICE O/P EST MOD 30 MIN: CPT | Performed by: FAMILY MEDICINE

## 2019-12-03 PROCEDURE — 99406 BEHAV CHNG SMOKING 3-10 MIN: CPT | Performed by: FAMILY MEDICINE

## 2019-12-03 PROCEDURE — G8427 DOCREV CUR MEDS BY ELIG CLIN: HCPCS | Performed by: FAMILY MEDICINE

## 2019-12-03 PROCEDURE — 3023F SPIROM DOC REV: CPT | Performed by: FAMILY MEDICINE

## 2019-12-03 ASSESSMENT — ENCOUNTER SYMPTOMS: BACK PAIN: 1

## 2019-12-03 ASSESSMENT — PATIENT HEALTH QUESTIONNAIRE - PHQ9: SUM OF ALL RESPONSES TO PHQ QUESTIONS 1-9: 4

## 2019-12-03 ASSESSMENT — LIFESTYLE VARIABLES: HOW OFTEN DO YOU HAVE A DRINK CONTAINING ALCOHOL: 0

## 2019-12-03 ASSESSMENT — COPD QUESTIONNAIRES: COPD: 1

## 2019-12-13 ENCOUNTER — OFFICE VISIT (OUTPATIENT)
Dept: PULMONOLOGY | Age: 44
End: 2019-12-13
Payer: MEDICARE

## 2019-12-13 VITALS
SYSTOLIC BLOOD PRESSURE: 129 MMHG | OXYGEN SATURATION: 97 % | HEIGHT: 68 IN | TEMPERATURE: 97.7 F | BODY MASS INDEX: 21.49 KG/M2 | HEART RATE: 100 BPM | WEIGHT: 141.8 LBS | DIASTOLIC BLOOD PRESSURE: 88 MMHG

## 2019-12-13 DIAGNOSIS — J41.0 SIMPLE CHRONIC BRONCHITIS (HCC): ICD-10-CM

## 2019-12-13 DIAGNOSIS — J44.1 CHRONIC OBSTRUCTIVE PULMONARY DISEASE WITH ACUTE EXACERBATION (HCC): ICD-10-CM

## 2019-12-13 DIAGNOSIS — F17.200 CURRENT SMOKER: ICD-10-CM

## 2019-12-13 DIAGNOSIS — G47.30 OBSERVED SLEEP APNEA: ICD-10-CM

## 2019-12-13 DIAGNOSIS — J31.0 CHRONIC RHINITIS: Primary | ICD-10-CM

## 2019-12-13 PROCEDURE — G8926 SPIRO NO PERF OR DOC: HCPCS | Performed by: INTERNAL MEDICINE

## 2019-12-13 PROCEDURE — 99214 OFFICE O/P EST MOD 30 MIN: CPT | Performed by: INTERNAL MEDICINE

## 2019-12-13 PROCEDURE — G8420 CALC BMI NORM PARAMETERS: HCPCS | Performed by: INTERNAL MEDICINE

## 2019-12-13 PROCEDURE — 4004F PT TOBACCO SCREEN RCVD TLK: CPT | Performed by: INTERNAL MEDICINE

## 2019-12-13 PROCEDURE — G8484 FLU IMMUNIZE NO ADMIN: HCPCS | Performed by: INTERNAL MEDICINE

## 2019-12-13 PROCEDURE — 3023F SPIROM DOC REV: CPT | Performed by: INTERNAL MEDICINE

## 2019-12-13 PROCEDURE — G8427 DOCREV CUR MEDS BY ELIG CLIN: HCPCS | Performed by: INTERNAL MEDICINE

## 2019-12-13 RX ORDER — RISPERIDONE 3 MG/1
TABLET, FILM COATED ORAL
COMMUNITY
Start: 2019-11-26

## 2019-12-13 RX ORDER — PREDNISONE 20 MG/1
20 TABLET ORAL DAILY
Qty: 10 TABLET | Refills: 0 | Status: SHIPPED | OUTPATIENT
Start: 2019-12-13 | End: 2019-12-23

## 2019-12-13 RX ORDER — DOXYCYCLINE HYCLATE 100 MG/1
100 CAPSULE ORAL 2 TIMES DAILY
Qty: 20 CAPSULE | Refills: 0 | Status: SHIPPED | OUTPATIENT
Start: 2019-12-13 | End: 2019-12-23

## 2019-12-13 RX ORDER — IPRATROPIUM BROMIDE AND ALBUTEROL SULFATE 2.5; .5 MG/3ML; MG/3ML
1 SOLUTION RESPIRATORY (INHALATION) EVERY 4 HOURS
Qty: 1 VIAL | Refills: 0
Start: 2019-12-13 | End: 2022-05-25 | Stop reason: SDUPTHER

## 2019-12-13 RX ORDER — TRIAMCINOLONE ACETONIDE 40 MG/ML
80 INJECTION, SUSPENSION INTRA-ARTICULAR; INTRAMUSCULAR ONCE
Qty: 2 ML | Refills: 0
Start: 2019-12-13 | End: 2019-12-13

## 2019-12-20 RX ORDER — NEBULIZER ACCESSORIES
1 KIT MISCELLANEOUS DAILY PRN
Qty: 1 KIT | Refills: 0 | Status: SHIPPED | OUTPATIENT
Start: 2019-12-20

## 2019-12-26 ENCOUNTER — TELEPHONE (OUTPATIENT)
Dept: PULMONOLOGY | Age: 44
End: 2019-12-26

## 2019-12-26 DIAGNOSIS — J44.1 CHRONIC OBSTRUCTIVE PULMONARY DISEASE WITH ACUTE EXACERBATION (HCC): Primary | ICD-10-CM

## 2019-12-26 RX ORDER — PREDNISONE 10 MG/1
TABLET ORAL
Qty: 35 TABLET | Refills: 0 | Status: SHIPPED | OUTPATIENT
Start: 2019-12-26 | End: 2020-01-10

## 2019-12-31 DIAGNOSIS — M54.6 CHRONIC BILATERAL THORACIC BACK PAIN: ICD-10-CM

## 2019-12-31 DIAGNOSIS — G89.29 CHRONIC BILATERAL THORACIC BACK PAIN: ICD-10-CM

## 2019-12-31 RX ORDER — GABAPENTIN 600 MG/1
TABLET ORAL
Qty: 180 TABLET | Refills: 2 | Status: SHIPPED | OUTPATIENT
Start: 2019-12-31 | End: 2020-04-23

## 2020-01-15 ENCOUNTER — OFFICE VISIT (OUTPATIENT)
Dept: PULMONOLOGY | Age: 45
End: 2020-01-15
Payer: MEDICARE

## 2020-01-15 VITALS
SYSTOLIC BLOOD PRESSURE: 123 MMHG | BODY MASS INDEX: 20.31 KG/M2 | WEIGHT: 134 LBS | OXYGEN SATURATION: 98 % | TEMPERATURE: 97 F | HEART RATE: 96 BPM | RESPIRATION RATE: 17 BRPM | HEIGHT: 68 IN | DIASTOLIC BLOOD PRESSURE: 77 MMHG

## 2020-01-15 PROCEDURE — 99213 OFFICE O/P EST LOW 20 MIN: CPT | Performed by: INTERNAL MEDICINE

## 2020-01-15 PROCEDURE — 4004F PT TOBACCO SCREEN RCVD TLK: CPT | Performed by: INTERNAL MEDICINE

## 2020-01-15 PROCEDURE — G8420 CALC BMI NORM PARAMETERS: HCPCS | Performed by: INTERNAL MEDICINE

## 2020-01-15 PROCEDURE — G8484 FLU IMMUNIZE NO ADMIN: HCPCS | Performed by: INTERNAL MEDICINE

## 2020-01-15 PROCEDURE — G8926 SPIRO NO PERF OR DOC: HCPCS | Performed by: INTERNAL MEDICINE

## 2020-01-15 PROCEDURE — 3023F SPIROM DOC REV: CPT | Performed by: INTERNAL MEDICINE

## 2020-01-15 PROCEDURE — G8427 DOCREV CUR MEDS BY ELIG CLIN: HCPCS | Performed by: INTERNAL MEDICINE

## 2020-01-15 RX ORDER — PREDNISONE 1 MG/1
TABLET ORAL
Qty: 20 TABLET | Refills: 0 | Status: SHIPPED | OUTPATIENT
Start: 2020-01-15 | End: 2021-04-12

## 2020-01-15 NOTE — PROGRESS NOTES
Chief Complaint/Referring Provider:  Patient has come to the office for pulmonary follow up     Patient apparently had called the office because of his symptoms, patient also wanted new tubing for his nebulizer and patient was also given some prednisone by Dr. Mony Jacobs, patient states that the prednisone is doing the trick, patient is not having that much of cough or expectoration or shortness of breath, patient still has nasal congestion, patient states especially in the morning time and he has to blow his nose and he takes his nasal spray after that, patient does not have any chest pain, patient does not have any difficulty in swallowing, no coughing or choking while eating, no odynophagia or dysphagia, patient does not have any abdominal symptoms of concern, patient does not have any increasing leg edema, patient continues to be a smoker in spite of being told not to do so, patient does not have any change in the ambient environment, patient has electric heat as a source of eating at the home, patient does not have any confusion or lethargy, no other pertinent review of system of concern         Previous  HPI: Patient is a 40-year-old male who has been referred to the office for a pulmonary consult  Patient says that he has shortness of breath all the time and patient has limited exercise tolerance, patient also says that he has cough along with that patient has phlegm especially in the morning time which is normally clear, along with a cough and expectoration and shortness of breath patient also has wheezing but does not have any significant chest pain, patient on questioning further states that he has been having some increased rhinorrhea and nasal congestion along with postnasal drainage, patient also has occasional otalgia without any ear discharge, patient is not having tinnitus, patient does not have any significant pleuritic chest pain, patient denies any palpitations or diaphoresis, patient does not have any Medication: Benzonatate 200 mg. She states she only has 2 tablets left    Provider: Dante Dos Santos MD  Preferred Contact Number: [phone] (mobile)      [facility]    Patient instructed to call pharmacy directly for future refills.      Advised patient that the nurse will call if there are questions or concerns, otherwise refill processing may take 48-72 hours.     Edwige is aware that  is out of the office today but asking if a provider on call can please help with this today if possible.   other pertinent review of system of concern    Past Medical History:   Diagnosis Date    Arthritis     Back disorder     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    Back pain     missing disc    COPD (chronic obstructive pulmonary disease) (HCC)     Hypoglycemia, unspecified     Knee instability     left knee unstable    Memory loss     Panic attacks     Psychiatric problem     Unspecified cerebral artery occlusion with cerebral infarction 2014    no weakness or speech problems; pt has dementia       Past Surgical History:   Procedure Laterality Date    HUMERUS FRACTURE SURGERY Right 4/1/2019    OPEN 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       CHECKPOINT; BIOMET performed by Lui Granado MD at [facility]   Allergen Reactions    Mustard Seed Swelling     Throat closes up       Medication list was reviewed and updated as needed in [facility]    Social History     Socioeconomic History    Marital status:       Spouse name: Felicitas Vargas Number of children: 0    Years of education: 15    Highest education level: Not on file   Occupational History    Not on file   Social Needs    Financial resource strain: Not on file    Food insecurity:     Worry: Not on file     Inability: Not on file    Transportation needs:     Medical: Not on file     Non-medical: Not on file   Tobacco Use    Smoking status: Current Every Day Smoker     Packs/day: 1.00     Years: 35.00     Pack years: 35.00     Types: Cigarettes    Smokeless tobacco: Never Used    Tobacco comment: 7/23/19 - smokes 1-1.5 ppd   Substance and Sexual Activity    Alcohol use: Not Currently     Comment: socially; 1-2 drinks per month    Drug use: Not Currently     Types: Marijuana     Comment: Quit using heroin 4 months ago    Sexual activity: Yes     Partners: Female   Lifestyle    Physical activity:     Days per week: Not on file     Minutes per session: Not on file    Stress: Not on file   Relationships    Social connections:     Talks on phone: Not on file     Gets together: Not on file     Attends Voodoo service: Not on file     Active member of club or organization: Not on file     Attends meetings of clubs or organizations: Not on file     Relationship status: Not on file    Intimate partner violence:     Fear of current or ex partner: Not on file     Emotionally abused: Not on file     Physically abused: Not on file     Forced sexual activity: Not on file   Other Topics Concern    Not on file   Social History Narrative    Not on file       Family History   Problem Relation Age of Onset    Diabetes Mother     Cancer Mother     Cancer Father             Review of Systems same as above    Physical Exam:  Blood pressure 123/77, pulse 96, temperature 97 °F (36.1 °C), temperature source Oral, resp. rate 17, height 5' 8\" (1.727 m), weight 134 lb (60.8 kg), SpO2 98 %.'  Constitutional:  No acute distress. HENT:  Oropharynx is clear and moist. No thyromegaly. Nasal mucosal hyperemia and congestion along with that patient has posterior pharyngeal cobbling along with that patient has Mallampati 2 decreased breath sound intensity  Eyes:  Conjunctivae arenormal. Pupils equal, round, and reactive to light. No scleral icterus. Neck: . No tracheal deviation present. No obvious thyroid mass. Cardiovascular:Normal rate, regular rhythm, normal heart sounds. No right ventricular heave. Nolower extremity edema. Pulmonary/Chest: No wheezes. No rales. Chest wall is not dull to percussion. Noaccessory muscle usage or stridor. Prolonged expiration with decreased breath sound density  Abdominal: Soft. Bowel sounds present. No distension or hernia. Notenderness. Musculoskeletal: No cyanosis. No clubbing. No obvious joint deformity. Lymphadenopathy: No cervical or supraclavicular adenopathy. Skin: Skin is warm and dry.   Has areas of vitiligo in the right hand area along with that patient has dry skin along with that patient has a tattoo  Psychiatric: Normal mood and affect. Behavior is normal.  No anxiety. Neurologic: Alert, awake and oriented. PERRL. Speech fluent,         Data:     Imaging:  I have reviewed radiology images personally. No orders to display     Xr Humerus Right (min 2 Views)    Result Date: 6/26/2019  Radiology exam is complete. No Radiologist dictation. Please follow up with ordering provider. ECHO-Conclusions      Summary   Normal left ventricle systolic function with an estimated ejection fraction   of 55%.   No regional wall motion abnormalities are seen.   Normal left ventricular diastolic filling pressure.   No valvular abnormalities present.   The mitral valve anterior leaflet is thickened at 0.6cm. It opens normally.   Trace mitral regurgitation. CT -Rt humerus-  1. Comminuted, displaced distal right humeral diaphyseal fracture with   exuberant periosteal reaction and developing hypertrophic callus formation   and heterotopic ossification at the margins of the fracture.  There is marked   anterior and lateral displacement of the distal humeral diaphyseal fracture   component with anterior apex angulation.  No significant central osseous   bridging or union is identified.  Findings are concerning for developing   malunion. 2. Mild degenerative changes of the right AC and glenohumeral joint. 3. Respiratory motion, parenchymal banding and atelectasis in the visualized   left lung. PFT shows that patient has very severe COPD also patient diffusion capacity when adjusted for volume was reduced also patient has minimal restrictive lung disease    Patient's sleep study shows patient does not have any significant SHRUTHI but patient does have some periodic leg movements    Patient did not do the x-ray chest    6-minute walk test does not show patient to have any exertional hypoxemia       Assessment:    1. Simple chronic bronchitis ([address]      2. Current smoker    3. COPD       4.  Chronic allergic rhinitis for which he was told to take some saline nasal spray 2-3 times a day patient was also given a prescription for Flonase and was told to take 2 puffs each nostril once a day and was told how to take it properly not avoid epistaxis  · Patient was told about the pathophysiology and sequelae of SHRUTHI  · Patient has been taking Symbicort inhaler along with that patient is supposed to taking albuterol inhaler 2 puffs every 6 on PRN basis  · Patient to continue with Spiriva Respimat along with the Symbicort as before  · Albuterol inhaler or DuoNeb nebulizer at home to be taken on a as needed basis only and not on a regular basis along with Symbicort and Spiriva otherwise patient will have a increased side effects  · If patient continues to lose weight and will consider CT chest without contrast to make sure that patient does not have any pathology of concern which may be contributing to the patient's symptomatology  · Smoking cessation reinforced  · Patient got Pneumovax in September 2018  · Patient does not take flu shots  · Patient's noncompliance will cause him to have increasing patient to morbidity /which will make clear to the patient mortality  · Patient to avoid extremes of temperature pressure and humidity and also to avoid sick contacts  · Compliance may be the biggest issue which can be challenging  · Patient was again reinforced that he needs to be careful about his diet lifestyle modifications smoking habit and medications in a regular fashion otherwise he will have symptoms  · Patient to follow-up in 3 months time otherwise or on PRN basis

## 2020-01-17 ENCOUNTER — TELEPHONE (OUTPATIENT)
Dept: FAMILY MEDICINE CLINIC | Age: 45
End: 2020-01-17

## 2020-03-04 ENCOUNTER — TELEPHONE (OUTPATIENT)
Dept: FAMILY MEDICINE CLINIC | Age: 45
End: 2020-03-04

## 2020-03-04 DIAGNOSIS — G89.29 CHRONIC BILATERAL THORACIC BACK PAIN: Primary | ICD-10-CM

## 2020-03-04 DIAGNOSIS — M54.6 CHRONIC BILATERAL THORACIC BACK PAIN: Primary | ICD-10-CM

## 2020-04-15 ENCOUNTER — TELEPHONE (OUTPATIENT)
Dept: PULMONOLOGY | Age: 45
End: 2020-04-15

## 2020-04-15 NOTE — TELEPHONE ENCOUNTER
Patient did not show for 3 month COPD VV appointment  with Raul Escamilla on 4/15/20    Same Day Cancellation: No    Patient rescheduled:  No    New appointment: n/a    Patient was also no show on: 8/28/19

## 2020-04-21 NOTE — TELEPHONE ENCOUNTER
.  Last office visit 12/3/2019     Last written 12/31/19 #180 2 refills    Next office visit scheduled 6/5/2020    Requested Prescriptions     Pending Prescriptions Disp Refills    gabapentin (NEURONTIN) 600 MG tablet [Pharmacy Med Name: GABAPENTIN 600MG TAB] 180 tablet 2     Sig: TAKE 2 TABLETS BY MOUTH THREE TIMES A DAY AS NEEDED FOR BACK PAIN

## 2020-04-23 RX ORDER — GABAPENTIN 600 MG/1
TABLET ORAL
Qty: 180 TABLET | Refills: 2 | Status: SHIPPED | OUTPATIENT
Start: 2020-04-23 | End: 2020-07-14

## 2020-05-05 ENCOUNTER — OFFICE VISIT (OUTPATIENT)
Dept: ORTHOPEDIC SURGERY | Age: 45
End: 2020-05-05
Payer: MEDICARE

## 2020-05-05 VITALS — WEIGHT: 134.04 LBS | HEIGHT: 68 IN | BODY MASS INDEX: 20.31 KG/M2

## 2020-05-05 PROCEDURE — 99213 OFFICE O/P EST LOW 20 MIN: CPT | Performed by: ORTHOPAEDIC SURGERY

## 2020-05-05 PROCEDURE — G8427 DOCREV CUR MEDS BY ELIG CLIN: HCPCS | Performed by: ORTHOPAEDIC SURGERY

## 2020-05-05 PROCEDURE — G8420 CALC BMI NORM PARAMETERS: HCPCS | Performed by: ORTHOPAEDIC SURGERY

## 2020-05-05 PROCEDURE — 4004F PT TOBACCO SCREEN RCVD TLK: CPT | Performed by: ORTHOPAEDIC SURGERY

## 2020-06-05 ENCOUNTER — VIRTUAL VISIT (OUTPATIENT)
Dept: FAMILY MEDICINE CLINIC | Age: 45
End: 2020-06-05
Payer: MEDICARE

## 2020-06-05 VITALS — SYSTOLIC BLOOD PRESSURE: 130 MMHG | OXYGEN SATURATION: 98 % | DIASTOLIC BLOOD PRESSURE: 64 MMHG | HEART RATE: 84 BPM

## 2020-06-05 LAB
A/G RATIO: 1.9 (ref 1.1–2.2)
ALBUMIN SERPL-MCNC: 4.3 G/DL (ref 3.4–5)
ALP BLD-CCNC: 106 U/L (ref 40–129)
ALT SERPL-CCNC: 10 U/L (ref 10–40)
ANION GAP SERPL CALCULATED.3IONS-SCNC: 11 MMOL/L (ref 3–16)
AST SERPL-CCNC: 18 U/L (ref 15–37)
BASOPHILS ABSOLUTE: 0.1 K/UL (ref 0–0.2)
BASOPHILS RELATIVE PERCENT: 0.7 %
BILIRUB SERPL-MCNC: 0.4 MG/DL (ref 0–1)
BUN BLDV-MCNC: 7 MG/DL (ref 7–20)
CALCIUM SERPL-MCNC: 9.2 MG/DL (ref 8.3–10.6)
CHLORIDE BLD-SCNC: 101 MMOL/L (ref 99–110)
CO2: 27 MMOL/L (ref 21–32)
CREAT SERPL-MCNC: 0.8 MG/DL (ref 0.9–1.3)
EOSINOPHILS ABSOLUTE: 0.1 K/UL (ref 0–0.6)
EOSINOPHILS RELATIVE PERCENT: 1.4 %
GFR AFRICAN AMERICAN: >60
GFR NON-AFRICAN AMERICAN: >60
GLOBULIN: 2.3 G/DL
GLUCOSE BLD-MCNC: 77 MG/DL (ref 70–99)
HCT VFR BLD CALC: 47.4 % (ref 40.5–52.5)
HEMOGLOBIN: 15.6 G/DL (ref 13.5–17.5)
LYMPHOCYTES ABSOLUTE: 1.6 K/UL (ref 1–5.1)
LYMPHOCYTES RELATIVE PERCENT: 23 %
MCH RBC QN AUTO: 31.3 PG (ref 26–34)
MCHC RBC AUTO-ENTMCNC: 32.9 G/DL (ref 31–36)
MCV RBC AUTO: 95 FL (ref 80–100)
MONOCYTES ABSOLUTE: 0.8 K/UL (ref 0–1.3)
MONOCYTES RELATIVE PERCENT: 11.3 %
NEUTROPHILS ABSOLUTE: 4.5 K/UL (ref 1.7–7.7)
NEUTROPHILS RELATIVE PERCENT: 63.6 %
PDW BLD-RTO: 14.7 % (ref 12.4–15.4)
PLATELET # BLD: 200 K/UL (ref 135–450)
PMV BLD AUTO: 9.7 FL (ref 5–10.5)
POTASSIUM SERPL-SCNC: 4.4 MMOL/L (ref 3.5–5.1)
RBC # BLD: 4.99 M/UL (ref 4.2–5.9)
SODIUM BLD-SCNC: 139 MMOL/L (ref 136–145)
TOTAL PROTEIN: 6.6 G/DL (ref 6.4–8.2)
WBC # BLD: 7.1 K/UL (ref 4–11)

## 2020-06-05 PROCEDURE — G8420 CALC BMI NORM PARAMETERS: HCPCS | Performed by: FAMILY MEDICINE

## 2020-06-05 PROCEDURE — 99406 BEHAV CHNG SMOKING 3-10 MIN: CPT | Performed by: FAMILY MEDICINE

## 2020-06-05 PROCEDURE — 99214 OFFICE O/P EST MOD 30 MIN: CPT | Performed by: FAMILY MEDICINE

## 2020-06-05 PROCEDURE — G8427 DOCREV CUR MEDS BY ELIG CLIN: HCPCS | Performed by: FAMILY MEDICINE

## 2020-06-05 PROCEDURE — 3023F SPIROM DOC REV: CPT | Performed by: FAMILY MEDICINE

## 2020-06-05 PROCEDURE — G8926 SPIRO NO PERF OR DOC: HCPCS | Performed by: FAMILY MEDICINE

## 2020-06-05 PROCEDURE — 4004F PT TOBACCO SCREEN RCVD TLK: CPT | Performed by: FAMILY MEDICINE

## 2020-06-05 RX ORDER — IPRATROPIUM BROMIDE AND ALBUTEROL SULFATE 2.5; .5 MG/3ML; MG/3ML
1 SOLUTION RESPIRATORY (INHALATION) EVERY 6 HOURS PRN
Qty: 360 ML | Refills: 11 | Status: SHIPPED | OUTPATIENT
Start: 2020-06-05 | End: 2021-05-25 | Stop reason: CLARIF

## 2020-06-05 ASSESSMENT — ENCOUNTER SYMPTOMS: BACK PAIN: 1

## 2020-06-05 NOTE — PROGRESS NOTES
Aisha Mobley is a 53 y.o. male    Chief Complaint   Patient presents with    COPD    Back Pain    Depression    Insomnia    Nicotine Dependence       HPI:    HPI    COPD   This is a chronic problem. The current episode started more than 1 year ago. The problem occurs daily. The problem has been gradually worsening. His symptoms are aggravated by any activity. His symptoms are alleviated by beta-agonist. He reports moderate improvement on treatment. His past medical history is significant for COPD.      Major depression with psychotic features. This is a chronic condition. Depression is stable. He takes Prozac daily. He also takes Risperdal at night. Helps calm racing thoughts. Used to hear voices and have visual hallucinations but none since being on Risperdal.      Chronic back pain. This is a chronic problem. The current episode started more than 1 year ago. The problem occurs constantly. The problem is unchanged. The pain is present in the lumbar spine. Associated symptoms include tingling. Risk factors include sedentary lifestyle. Treatments tried: gabapentin. The treatment provided significant relief.      Insomnia, primary. This is a chronic condition. Initially, Trazodone did not help but it has lately been helping. Also taking Risperidone as well.     Nicotine dependence. Smokes 1 PPD for 40 years. No desire to quit smoking. He does have underlying mental illness as noted above, making Chantix a difficult option. ROS:    Review of Systems   Musculoskeletal: Positive for back pain. Psychiatric/Behavioral: Positive for dysphoric mood. /64   Pulse 84   SpO2 98%     Physical Exam:    Physical Exam  Constitutional:       General: He is not in acute distress. Appearance: Normal appearance. He is normal weight. He is not ill-appearing or toxic-appearing. HENT:      Head: Normocephalic. Neurological:      Mental Status: He is alert.    Psychiatric:         Mood and Affect: Mood

## 2020-06-06 LAB
HIV AG/AB: NORMAL
HIV ANTIGEN: NORMAL
HIV-1 ANTIBODY: NORMAL
HIV-2 AB: NORMAL
TOTAL SYPHILLIS IGG/IGM: NORMAL

## 2020-06-07 LAB
C. TRACHOMATIS DNA ,URINE: NEGATIVE
N. GONORRHOEAE DNA, URINE: NEGATIVE

## 2020-06-19 RX ORDER — ALBUTEROL SULFATE 90 UG/1
AEROSOL, METERED RESPIRATORY (INHALATION)
Qty: 8.5 G | Refills: 0 | Status: SHIPPED | OUTPATIENT
Start: 2020-06-19 | End: 2020-08-04

## 2020-06-19 NOTE — TELEPHONE ENCOUNTER
.  Last office visit 12/3/2019     Last written 6-20-19 1 with 11      Next office visit scheduled 12/18/2020    Requested Prescriptions     Pending Prescriptions Disp Refills    albuterol sulfate  (90 Base) MCG/ACT inhaler [Pharmacy Med Name: ALBUTEROL HFA AER] 8.5 g 0     Sig: INHALE 2 PUFFS EVERY 6 HOURS AS NEEDED WHEEZING

## 2020-07-14 RX ORDER — GABAPENTIN 600 MG/1
TABLET ORAL
Qty: 180 TABLET | Refills: 2 | Status: SHIPPED | OUTPATIENT
Start: 2020-07-14 | End: 2020-10-09

## 2020-07-14 NOTE — TELEPHONE ENCOUNTER
Refill Request GABAPENTIN 600MG TAB     Last Seen: 12/3/2019    Last Written: 4/23/20 180 tablets with 2 refills    Next Appointment:   Future Appointments   Date Time Provider Varsha Solitario   7/21/2020  2:20 PM Sekou Hunter MD [facility]   12/18/2020  2:30 PM DO ADONAY Sosa  JEFFERY             Requested Prescriptions     Pending Prescriptions Disp Refills    gabapentin (NEURONTIN) 600 MG tablet [Pharmacy Med Name: GABAPENTIN 600MG TAB] 180 tablet 2     Sig: TAKE 2 TABLETS BY MOUTH THREE TIMES A DAY AS NEEDED FOR BACK PAIN

## 2020-07-21 ENCOUNTER — TELEPHONE (OUTPATIENT)
Dept: PULMONOLOGY | Age: 45
End: 2020-07-21

## 2020-08-04 RX ORDER — ALBUTEROL SULFATE 90 UG/1
AEROSOL, METERED RESPIRATORY (INHALATION)
Qty: 8.5 G | Refills: 0 | Status: SHIPPED | OUTPATIENT
Start: 2020-08-04 | End: 2020-08-25

## 2020-08-04 NOTE — TELEPHONE ENCOUNTER
Refill Request     Last Seen: 12/3/2019    Last Written: 8.5g  0rf  6/19/2020    Next Appointment:   Future Appointments   Date Time Provider Varsha Solitario   9/1/2020  3:00 PM Ashutosh Frederick MD [facility]   12/18/2020  2:30 PM DO ADONAY Sosa             Requested Prescriptions     Pending Prescriptions Disp Refills    albuterol sulfate  (90 Base) MCG/ACT inhaler [Pharmacy Med Name: ALBUTEROL SULFATE 90MCG INH] 8.5 g 0     Sig: INHALE 2 PUFFS EVERY 6 HOURS AS NEEDED WHEEZING

## 2020-08-31 ENCOUNTER — TELEPHONE (OUTPATIENT)
Dept: PULMONOLOGY | Age: 45
End: 2020-08-31

## 2020-08-31 NOTE — TELEPHONE ENCOUNTER
Patient cancelled appointment on 9/1/2020 with Dr. Martin Moy for COPD Follow up. Reason: cancelled via reminder calls    Patient did not reschedule appointment.      Last OV 1/15/2020

## 2020-10-07 ENCOUNTER — TELEPHONE (OUTPATIENT)
Dept: FAMILY MEDICINE CLINIC | Age: 45
End: 2020-10-07

## 2020-10-07 NOTE — TELEPHONE ENCOUNTER
----- Message from Malaika Whittaker sent at 10/7/2020  3:44 PM EDT -----  Subject: Message to Provider    QUESTIONS  Information for Provider? Pt requesting to have a 5-year handicap placard   sent to [facility] fax at [phone].   ---------------------------------------------------------------------------  --------------  Dilcia Fieldchild DON  What is the best way for the office to contact you? OK to leave message on   voicemail  Preferred Call Back Phone Number? [phone]  ---------------------------------------------------------------------------  --------------  SCRIPT ANSWERS  Relationship to Patient?  Self

## 2020-10-09 RX ORDER — GABAPENTIN 600 MG/1
TABLET ORAL
Qty: 180 TABLET | Refills: 2 | Status: SHIPPED | OUTPATIENT
Start: 2020-10-09 | End: 2021-01-05

## 2020-10-09 NOTE — TELEPHONE ENCOUNTER
.  Last office visit 6/5/2020     Last written 7- 180 with 2      Next office visit scheduled 12/18/2020    Requested Prescriptions     Pending Prescriptions Disp Refills    gabapentin (NEURONTIN) 600 MG tablet [Pharmacy Med Name: GABAPENTIN 600MG TAB] 180 tablet 2     Sig: TAKE 2 TABLETS BY MOUTH THREE TIMES A DAY AS NEEDED FOR BACK PAIN

## 2020-10-15 ENCOUNTER — TELEPHONE (OUTPATIENT)
Dept: FAMILY MEDICINE CLINIC | Age: 45
End: 2020-10-15

## 2020-10-15 NOTE — TELEPHONE ENCOUNTER
Patient calling asking for us to fax his handicap cy to [phone]. I gaced this around 1:15 today, Page Bob faxed this at 1:30 today, and Denisse faxed this over on 10/7/2020. Fax # was verified correctly. Received fax confirmations for today x2. Patient will have to come  placard. He will come tomorrow.

## 2021-01-05 DIAGNOSIS — G89.29 CHRONIC BILATERAL THORACIC BACK PAIN: ICD-10-CM

## 2021-01-05 DIAGNOSIS — M54.6 CHRONIC BILATERAL THORACIC BACK PAIN: ICD-10-CM

## 2021-01-05 RX ORDER — GABAPENTIN 600 MG/1
TABLET ORAL
Qty: 180 TABLET | Refills: 2 | Status: SHIPPED | OUTPATIENT
Start: 2021-01-05 | End: 2021-04-01

## 2021-03-30 DIAGNOSIS — M54.6 CHRONIC BILATERAL THORACIC BACK PAIN: ICD-10-CM

## 2021-03-30 DIAGNOSIS — G89.29 CHRONIC BILATERAL THORACIC BACK PAIN: ICD-10-CM

## 2021-03-30 NOTE — TELEPHONE ENCOUNTER
Refill Request - Controlled Substance    Last Seen: 6/5/2020    Last Written: 1/5/21    Last UDS: 1/17/19    Med Agreement Signed On: None     Next Appointment: NO Future appointments     Message to  to schedule appointment.        Requested Prescriptions     Pending Prescriptions Disp Refills    gabapentin (NEURONTIN) 600 MG tablet [Pharmacy Med Name: Gabapentin 600MG TABS] 180 tablet 2     Sig: TAKE 2 TABLETS BY MOUTH THREE TIMES A DAY AS NEEDED FOR BACK PAIN

## 2021-04-01 RX ORDER — GABAPENTIN 600 MG/1
TABLET ORAL
Qty: 180 TABLET | Refills: 2 | Status: SHIPPED | OUTPATIENT
Start: 2021-04-01 | End: 2021-06-28

## 2021-04-12 ENCOUNTER — OFFICE VISIT (OUTPATIENT)
Dept: FAMILY MEDICINE CLINIC | Age: 46
End: 2021-04-12
Payer: MEDICARE

## 2021-04-12 ENCOUNTER — TELEPHONE (OUTPATIENT)
Dept: FAMILY MEDICINE CLINIC | Age: 46
End: 2021-04-12

## 2021-04-12 VITALS
DIASTOLIC BLOOD PRESSURE: 60 MMHG | OXYGEN SATURATION: 97 % | BODY MASS INDEX: 21.75 KG/M2 | HEART RATE: 92 BPM | WEIGHT: 143 LBS | SYSTOLIC BLOOD PRESSURE: 132 MMHG

## 2021-04-12 DIAGNOSIS — F33.3 SEVERE RECURRENT MAJOR DEPRESSIVE DISORDER WITH PSYCHOTIC FEATURES (HCC): ICD-10-CM

## 2021-04-12 DIAGNOSIS — F03.90 DEMENTIA WITHOUT BEHAVIORAL DISTURBANCE, UNSPECIFIED DEMENTIA TYPE: ICD-10-CM

## 2021-04-12 DIAGNOSIS — J44.9 CHRONIC OBSTRUCTIVE PULMONARY DISEASE, UNSPECIFIED COPD TYPE (HCC): ICD-10-CM

## 2021-04-12 DIAGNOSIS — M54.6 CHRONIC BILATERAL THORACIC BACK PAIN: ICD-10-CM

## 2021-04-12 DIAGNOSIS — Z13.220 SCREENING FOR LIPID DISORDERS: ICD-10-CM

## 2021-04-12 DIAGNOSIS — F17.210 CIGARETTE NICOTINE DEPENDENCE WITHOUT COMPLICATION: ICD-10-CM

## 2021-04-12 DIAGNOSIS — G89.29 CHRONIC BILATERAL THORACIC BACK PAIN: ICD-10-CM

## 2021-04-12 DIAGNOSIS — Z00.00 ROUTINE GENERAL MEDICAL EXAMINATION AT A HEALTH CARE FACILITY: Primary | ICD-10-CM

## 2021-04-12 PROCEDURE — G8420 CALC BMI NORM PARAMETERS: HCPCS | Performed by: FAMILY MEDICINE

## 2021-04-12 PROCEDURE — G8926 SPIRO NO PERF OR DOC: HCPCS | Performed by: FAMILY MEDICINE

## 2021-04-12 PROCEDURE — G0439 PPPS, SUBSEQ VISIT: HCPCS | Performed by: FAMILY MEDICINE

## 2021-04-12 PROCEDURE — 99214 OFFICE O/P EST MOD 30 MIN: CPT | Performed by: FAMILY MEDICINE

## 2021-04-12 PROCEDURE — 3023F SPIROM DOC REV: CPT | Performed by: FAMILY MEDICINE

## 2021-04-12 PROCEDURE — 4004F PT TOBACCO SCREEN RCVD TLK: CPT | Performed by: FAMILY MEDICINE

## 2021-04-12 PROCEDURE — G8428 CUR MEDS NOT DOCUMENT: HCPCS | Performed by: FAMILY MEDICINE

## 2021-04-12 RX ORDER — TIOTROPIUM BROMIDE INHALATION SPRAY 1.56 UG/1
2 SPRAY, METERED RESPIRATORY (INHALATION) DAILY
Qty: 2 INHALER | Refills: 5 | Status: SHIPPED | OUTPATIENT
Start: 2021-04-12 | End: 2021-05-25 | Stop reason: CLARIF

## 2021-04-12 RX ORDER — BUDESONIDE AND FORMOTEROL FUMARATE DIHYDRATE 160; 4.5 UG/1; UG/1
AEROSOL RESPIRATORY (INHALATION)
Qty: 10.2 G | Refills: 5 | Status: SHIPPED | OUTPATIENT
Start: 2021-04-12 | End: 2021-06-03 | Stop reason: SDUPTHER

## 2021-04-12 SDOH — ECONOMIC STABILITY: INCOME INSECURITY: HOW HARD IS IT FOR YOU TO PAY FOR THE VERY BASICS LIKE FOOD, HOUSING, MEDICAL CARE, AND HEATING?: NOT HARD AT ALL

## 2021-04-12 SDOH — ECONOMIC STABILITY: TRANSPORTATION INSECURITY
IN THE PAST 12 MONTHS, HAS LACK OF TRANSPORTATION KEPT YOU FROM MEETINGS, WORK, OR FROM GETTING THINGS NEEDED FOR DAILY LIVING?: NO

## 2021-04-12 SDOH — ECONOMIC STABILITY: FOOD INSECURITY: WITHIN THE PAST 12 MONTHS, YOU WORRIED THAT YOUR FOOD WOULD RUN OUT BEFORE YOU GOT MONEY TO BUY MORE.: NEVER TRUE

## 2021-04-12 ASSESSMENT — LIFESTYLE VARIABLES
HAVE YOU OR SOMEONE ELSE BEEN INJURED AS A RESULT OF YOUR DRINKING: 0
AUDIT TOTAL SCORE: 1
HOW OFTEN DURING THE LAST YEAR HAVE YOU NEEDED AN ALCOHOLIC DRINK FIRST THING IN THE MORNING TO GET YOURSELF GOING AFTER A NIGHT OF HEAVY DRINKING: 0
HOW OFTEN DURING THE LAST YEAR HAVE YOU HAD A FEELING OF GUILT OR REMORSE AFTER DRINKING: 0
AUDIT-C TOTAL SCORE: 1
HOW OFTEN DURING THE LAST YEAR HAVE YOU BEEN UNABLE TO REMEMBER WHAT HAPPENED THE NIGHT BEFORE BECAUSE YOU HAD BEEN DRINKING: 0

## 2021-04-12 ASSESSMENT — PATIENT HEALTH QUESTIONNAIRE - PHQ9
SUM OF ALL RESPONSES TO PHQ QUESTIONS 1-9: 0
SUM OF ALL RESPONSES TO PHQ9 QUESTIONS 1 & 2: 0
SUM OF ALL RESPONSES TO PHQ QUESTIONS 1-9: 0

## 2021-04-12 ASSESSMENT — ENCOUNTER SYMPTOMS: BACK PAIN: 1

## 2021-04-12 NOTE — TELEPHONE ENCOUNTER
PA needed for Spiriva  DX:  COPD  KEY:  [facility] Phone Number Called: [phone] (home)        Message: pt called and is asking if his heart monitor can be ordered today? Since he was advised at ER he needed it ASAP. He is also wondering if he can exercise at his gym and attend his PT appointment.    Left Message for patient to call back: N\A

## 2021-04-12 NOTE — PROGRESS NOTES
Demetris Carias is a 50 y.o. male    Chief Complaint   Patient presents with    Other     COPD    Medicare AWV    Back Pain    Depression       HPI:    HPI    COPD   This is a chronic problem. The current episode started more than 1 year ago. The problem occurs daily. The problem has been gradually worsening. His symptoms are aggravated by any activity. His symptoms are alleviated by beta-agonist. He reports moderate improvement on treatment. His past medical history is significant for COPD.      Major depression with psychotic features. This is a chronic condition. Depression is stable. He takes Prozac daily. He also takes Risperdal at night. Helps calm racing thoughts. Used to hear voices and have visual hallucinations but none since being on Risperdal.      Chronic back pain. This is a chronic problem. The current episode started more than 1 year ago. The problem occurs constantly. The problem is unchanged. The pain is present in the lumbar spine. Associated symptoms include tingling. Risk factors include sedentary lifestyle. Treatments tried: gabapentin. The treatment provided significant relief.      Insomnia, primary. This is a chronic condition. Initially, Trazodone did not help but it has lately been helping. Also taking Risperidone as well.     Nicotine dependence. Smokes 1 PPD for 40 years. No desire to quit smoking. He does have underlying mental illness as noted above, making Chantix a difficult option. ROS:    Review of Systems   Musculoskeletal: Positive for back pain. Psychiatric/Behavioral: Positive for dysphoric mood. /60 (Site: Left Upper Arm, Position: Sitting, Cuff Size: Large Adult)   Pulse 92   Wt 143 lb (64.9 kg)   SpO2 97%   BMI 21.75 kg/m²     Physical Exam:    Physical Exam  Constitutional:       General: He is not in acute distress. Appearance: Normal appearance. He is normal weight. He is not ill-appearing or toxic-appearing. HENT:      Head: Normocephalic. (TOPAMAX) 50 MG tablet Take 50 mg by mouth daily       donepezil (ARICEPT) 5 MG tablet Take 1 tablet by mouth nightly (Patient taking differently: Take 10 mg by mouth nightly ) 12 tablet 0     No current facility-administered medications for this visit. Assessment:    1. Routine general medical examination at a health care facility    2. Chronic obstructive pulmonary disease, unspecified COPD type ([address]    3. Chronic bilateral thoracic back pain    4. Cigarette nicotine dependence without complication    5. Severe recurrent major depressive disorder with psychotic features ([address]    6. Dementia without behavioral disturbance, unspecified dementia type ([address]    7. Screening for lipid disorders        Plan:    1. Chronic obstructive pulmonary disease, unspecified COPD type ([address]  Stable. Continue current medications. - CBC Auto Differential  - Comprehensive Metabolic Panel    2. Chronic bilateral thoracic back pain  Stable. Continue current medications. 3. Cigarette nicotine dependence without complication  No desire to quit smoking. 4. Severe recurrent major depressive disorder with psychotic features ([address]  Stable. Continue current medications. 5. Dementia without behavioral disturbance  Stable. Continue current medications. Return in about 6 months (around 10/12/2021) for COPD, Low Back Pain, Tobacco abuse, Mood disorder.

## 2021-04-12 NOTE — PROGRESS NOTES
Medicare Annual Wellness Visit  Name: Keegan Mauricio Date: 2021   MRN: <[id number]> Sex: Male   Age: 50 y.o. Ethnicity: Non-/Non    : 1975 Race: Caleb Kuhn is here for Other (COPD) and Medicare AWV    Screenings for behavioral, psychosocial and functional/safety risks, and cognitive dysfunction are all negative except as indicated below. These results, as well as other patient data from the [address] form, are documented in Flowsheets linked to this Encounter. Allergies   Allergen Reactions    Mustard Seed Swelling     Throat closes up       Prior to Visit Medications    Medication Sig Taking?  Authorizing Provider   tiotropium (SPIRIVA RESPIMAT) 1.25 MCG/ACT AERS inhaler Inhale 2 puffs into the lungs daily Yes Sotircrispin Hammondsos, DO   budesonide-formoterol (SYMBICORT) 160-4.5 MCG/ACT AERO INHALE 2 PUFFS INTO THE LUNGS TWICE A DAY Yes Sotirios Koros, DO   gabapentin (NEURONTIN) 600 MG tablet TAKE 2 TABLETS BY MOUTH THREE TIMES A DAY AS NEEDED FOR BACK PAIN  Sotircrispin Alonso, DO   albuterol sulfate  (90 Base) MCG/ACT inhaler INHALE 2 PUFFS EVERY 6 HOURS AS NEEDED WHEEZING  Sotircrispin Hammondsos, DO   ipratropium-albuterol (DUONEB) 0.5-2.5 (3) MG/3ML SOLN nebulizer solution Inhale 3 mLs into the lungs every 6 hours as needed for Shortness of Breath  Sotircrispin Koros, DO   ibuprofen (ADVIL;MOTRIN) 800 MG tablet TAKE 1 TABLET BY MOUTH EVERY 8 HOURS AS NEEDED PAIN WITH FOOD  LING Poe - CNP   Respiratory Therapy Supplies (NEBULIZER/TUBING/MOUTHPIECE) KIT 1 kit by Does not apply route daily as needed (Patient is needing new equipment)  Cristela Burgess MD   risperiDONE (RISPERDAL) 3 MG tablet   Historical Provider, MD   ipratropium-albuterol (DUONEB) 0.5-2.5 (3) MG/3ML SOLN nebulizer solution Take 3 mLs by nebulization every 4 hours  Alycia Lopez MD   aspirin 325 MG tablet Take 325 mg by mouth daily  Historical Provider, MD   fluticasone (FLONASE) 50 MCG/ACT nasal spray 1 spray by Each Nostril route daily  Gerald Power MD   vitamin D (CHOLECALCIFEROL) 1000 UNIT TABS tablet Take 1 tablet by mouth daily  Durward Bumpers, DO   traZODone (DESYREL) 100 MG tablet Take 0.5-1 tablets by mouth nightly as needed for Sleep  Sogregory Alonso, DO   FLUoxetine (PROZAC) 40 MG capsule Take 1 capsule by mouth daily  Sogregory Alonso DO   Garlic 10 MG CAPS Take by mouth  Historical Provider, MD   buprenorphine (SUBUTEX) 2 MG SUBL SL tablet Place 16 mg under the tongue daily.    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    Back pain     missing disc    COPD (chronic obstructive pulmonary disease) ([facility] [address]     Hypoglycemia, unspecified     Knee instability     left knee unstable    Memory loss     Panic attacks     Psychiatric problem     Unspecified cerebral artery occlusion with cerebral infarction 2014    no weakness or speech problems; pt has dementia       Past Surgical History:   Procedure Laterality Date    HUMERUS FRACTURE SURGERY Right 4/1/2019    OPEN 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       CHECKPOINT; BIOMET performed by Rebeca Pa MD at [address] [place] [address] History   Problem Relation Age of Onset    Diabetes Mother     Cancer Mother     Cancer Father        CareTeam (Including outside providers/suppliers regularly involved in providing care):   Patient Care Team:  Durward Bumpers, DO as PCP - General (Family Medicine)  Durward Bumpers, DO as PCP - [address] [place] [address]  [facility]  Rebeca Pa MD as Surgeon (Orthopedic Surgery)  Jefe De La Cruz MD as Consulting Physician (Pulmonology)    Wt Readings from Last 3 Encounters:   04/12/21 143 lb (64.9 kg)   05/05/20 134 lb 0.6 oz (60.8 kg)   01/15/20 134 lb (60.8 kg)     Vitals:    04/12/21 1036   BP: 132/60   Site: Left Upper Arm   Position: Sitting   Cuff Size: Large Adult   Pulse: 92   SpO2: 97%   Weight: 143 lb (64.9 kg)     Body mass index is 21.75 kg/m². Based upon direct observation of the patient, evaluation of cognition reveals recent and remote memory intact. Patient's complete Health Risk Assessment and screening values have been reviewed and are found in Flowsheets. The following problems were reviewed today and where indicated follow up appointments were made and/or referrals ordered. Positive Risk Factor Screenings with Interventions:         Substance History:  Social History     Tobacco History     Smoking Status  Current Every Day Smoker Smoking Frequency  1 pack/day for 35 years (35 pk yrs) Smoking Tobacco Type  Cigarettes    Smokeless Tobacco Use  Never Used    Tobacco Comment  7/23/19 - smokes 1-1.5 ppd          Alcohol History     Alcohol Use Status  Not Currently Comment  socially; 1-2 drinks per month          Drug Use     Drug Use Status  Not Currently Types  Marijuana Comment  Quit using heroin 4 months ago          Sexual Activity     Sexually Active  Yes Partners  Female               Alcohol Screening: Audit-C Score: 1  Total Score: 1    A score of 8 or more is associated with harmful or hazardous drinking. A score of 13 or more in women, and 15 or more in men, is likely to indicate alcohol dependence. Substance Abuse Interventions:  · Alcohol misuse/dependence:  educational materials provided, drinks rarely    General Health and ACP:  General  In general, how would you say your health is?: Fair  In the past 7 days, have you experienced any of the following?  New or Increased Pain, New or Increased Fatigue, Loneliness, Social Isolation, Stress or Anger?: None of These  Do you get the social and emotional support that you need?: Yes  Do you have a Living Will?: (!) No  Advance Directives     Power of Mirna Peña Will ACP-Advance Directive ACP-Power of     Not on File Not on File Not on File Not on File      General Health Risk Interventions:  · No Living Will: Patient declines ACP discussion/assistance    Health Habits/Nutrition:  Health Habits/Nutrition  Do you exercise for at least 20 minutes 2-3 times per week?: (!) No  Have you lost any weight without trying in the past 3 months?: No  Do you eat only one meal per day?: No  Have you seen the dentist within the past year?: (!) No     Health Habits/Nutrition Interventions:  · Inadequate physical activity:  patient is not ready to increase his/her physical activity level at this time  · Dental exam overdue:  patient declines dental evaluation    Hearing/Vision:  No exam data present  Hearing/Vision  Do you or your family notice any trouble with your hearing that hasn't been managed with hearing aids?: No  Do you have difficulty driving, watching TV, or doing any of your daily activities because of your eyesight?: No  Have you had an eye exam within the past year?: (!) No  Hearing/Vision Interventions:  · Vision concerns:  patient encouraged to make appointment with his/her eye specialist      Personalized Preventive Plan   Current Health Maintenance Status  Immunization History   Administered Date(s) Administered    Pneumococcal Polysaccharide ([id number]) 09/21/2018    Tdap (Boostrix, Adacel) 12/03/2019        Health Maintenance   Topic Date Due    COVID-19 Vaccine (1) Never done   [employer] Visit (AWV)  Never done    Flu vaccine (Season Ended) 09/01/2021    Lipid screen  12/27/2023    DTaP/Tdap/Td vaccine (2 - Td) 12/03/2029    Pneumococcal 0-64 years Vaccine  Completed    Hepatitis C screen  Completed    HIV screen  Completed    Hepatitis A vaccine  Aged Out    Hepatitis B vaccine  Aged Out    Hib vaccine  Aged Out    Meningococcal (ACWY) vaccine  Aged Out     Recommendations for [employer] Due: see orders and patient instructions/AVS.  . Recommended screening schedule for the next 5-10 years is provided to the patient in written form: see Patient Instructions/AVS.    Martha KAPLAN LPN, [birth year], performed the documented evaluation under the direct supervision of the attending physician. This encounter was performed under [facility] DOs, direct supervision, 4/12/2021.

## 2021-04-12 NOTE — PATIENT INSTRUCTIONS
cannot be cured. But if your health is getting worse, you may want to make decisions about end-of-life care. Planning for the end of your life does not mean that you are giving up. It is a way to make sure that your wishes are met. Clearly stating your wishes can make it easier for your loved ones. Making plans while you are still able may also ease your mind and make your final days less stressful and more meaningful. Follow-up care is a key part of your treatment and safety. Be sure to make and go to all appointments, and call your doctor if you are having problems. It's also a good idea to know your test results and keep a list of the medicines you take. What can you do to plan for the end of life? You can bring these issues up with your doctor. You do not need to wait until your doctor starts the conversation. You might start with, \"What makes life worth living for me is. Ml Rued Ml Rued \" And then follow it with, \"I would not be willing to live with . Ml Rued Ml Rued Ml Rued \" When you complete this sentence it helps your doctor understand your wishes. Talk openly and honestly with your doctor. This is the best way to understand the decisions you will need to make as your health changes. Know that you can always change your mind. Ask your doctor about commonly used life-support measures. These include tube feedings, breathing machines, and fluids given through a vein (IV). Understanding these treatments will help you decide whether you want them. You may choose to have these life-supporting treatments for a limited time. This allows a trial period to see whether they will help you. You may also decide that you want your doctor to take only certain measures to keep you alive. It may help to think about the big picture, like what makes life worth living for you or what your values and goals are. Talk to your doctor about how long you are likely to live.  Your doctor may be able to give you an idea of what usually happens with your specific illness. Think about preparing papers that state your wishes. These papers are called advance directives. If you do this early and review them often, there will not be any confusion about what you want. You can change your instructions at any time. Which papers should you prepare? Advance directives are legal papers that tell doctors how you want to be cared for at the end of your life. You do not need a  to write these papers. Ask your doctor or your state health department for information on how to write your advance directives. They may have the forms for each of these types of papers. Make sure your doctor has a copy of these on file, and give a copy to a family member or close friend. Consider a do-not-resuscitate order (DNR). This order asks that no extra treatments be done if your heart stops or you stop breathing. Extra treatments may include cardiopulmonary resuscitation (CPR), electrical shock to restart your heart, or a machine to breathe for you. If you decide to have a DNR order, ask your doctor to explain and write it. Place the order in your home where everyone can easily see it. Consider a living will. A living will explains your wishes about life support and other treatments at the end of your life if you become unable to speak for yourself. Living foy tell doctors to use or not use treatments that would keep you alive. You must have one or two witnesses or a notary present when you sign this form. A living will may be called something else in your state. Consider a medical power of . This form allows you to name a person to make decisions about your care if you are not able to. Most people ask a close friend or family member. Talk to this person about the kinds of treatments you want and those that you do not want. Make sure this person understands your wishes. A medical power of  may be called something else in your state. These legal papers are simple to change. Tell your doctor what you want to change, and ask him or her to make a note in your medical file. Give your family updated copies of the papers. Where can you learn more? Go to https://chpekelly.[employer]. org and sign in to your [employer]ible (formerly [employer]savers) account. Enter P184 in the [facility] box to learn more about \"Advance Care Planning: Care Instructions. \"     If you do not have an account, please click on the \"Sign Up Now\" link. Current as of: July 17, 2020               Content Version: 12.8  © 3924-1995 Healthwise, [employer]. Care instructions adapted under license by [facility] ([facility]). If you have questions about a medical condition or this instruction, always ask your healthcare professional. [address] any warranty or liability for your use of this information. ·        Learning About Living Yossi Ordoñez  What is a living will? A living will, also called a declaration, is a legal form. It tells your family and your doctor your wishes when you can't speak for yourself. It's used by the health professionals who will treat you as you near the end of your life or if you get seriously hurt or ill. If you put your wishes in writing, your loved ones and others will know what kind of care you want. They won't need to guess. This can ease your mind and be helpful to others. And you can change or cancel your living will at any time. A living will is not the same as an estate or property will. An estate will explains what you want to happen with your money and property after you die. How do you use it? A living will is used to describe the kinds of treatment or life support you want as you near the end of your life or if you get seriously hurt or ill. Keep these facts in mind about living foy. Your living will is used only if you can't speak or make decisions for yourself.  Most often, one or more doctors must certify that you can't speak or decide for yourself before your living will:  Do you know enough about life support methods that might be used? If not, talk to your doctor so you know what might be done if you can't breathe on your own, your heart stops, or you can't swallow. What things would you still want to be able to do after you receive life-support methods? Would you want to be able to walk? To speak? To eat on your own? To live without the help of machines? Do you want certain Hinduism practices performed if you become very ill? If you have a choice, where do you want to be cared for? In your home? At a hospital or nursing home? If you have a choice at the end of your life, where would you prefer to die? At home? In a hospital or nursing home? Somewhere else? Would you prefer to be buried or cremated? Do you want your organs to be donated after you die? What should you do with your living will? Make sure that your family members and your health care agent have copies of your living will (also called a declaration). Give your doctor a copy of your living will. Ask him or her to keep it as part of your medical record. If you have more than one doctor, make sure that each one has a copy. Put a copy of your living will where it can be easily found. For example, some people may put a copy on their refrigerator door. If you are using a digital copy, be sure your doctor, family members, and health care agent know how to find and access it. Where can you learn more? Go to https://Anti-M[employer]Libra Entertainment.Stratavia. org and sign in to your EARTHNET account. Enter O335 in the [facility] box to learn more about \"Learning About Living Ranjit Oneil. \"     If you do not have an account, please click on the \"Sign Up Now\" link. Current as of: July 17, 2020               Content Version: 12.8  © 9341-3624 Healthwise, Incorporated. Care instructions adapted under license by [facility] ([facility]).  If you have questions about a medical condition or this instruction, always ask your healthcare professional. [address] any warranty or liability for your use of this information. ·        Learning About Medical Power of   What is a medical power of ? A medical power of , also called a durable power of  for health care, is one type of the legal forms called advance directives. It lets you name the person you want to make treatment decisions for you if you can't speak or decide for yourself. The person you choose is called your health care agent. This person is also called a health care proxy or health care surrogate. A medical power of  may be called something else in your state. How do you choose a health care agent? Choose your health care agent carefully. This person may or may not be a family member. Talk to the person before you make your final decision. Make sure he or she is comfortable with this responsibility. It's a good idea to choose someone who:  Is at least 25years old. Knows you well and understands what makes life meaningful for you. Understands your Presybeterian and moral values. Will do what you want, not what he or she wants. Will be able to make difficult choices at a stressful time. Will be able to refuse or stop treatment, if that is what you would want, even if you could die. Will be firm and confident with health professionals if needed. Will ask questions to get needed information. Lives near you or agrees to travel to you if needed. Your family may help you make medical decisions while you can still be part of that process. But it's important to choose one person to be your health care agent in case you aren't able to make decisions for yourself. If you don't fill out the legal form and name a health care agent, the decisions your family can make may be limited. A health care agent may be called something else in your state. Who will make decisions for you if you don't have a health care agent?   If your medical history including lifestyle, illnesses that may run in your family, and various assessments and screenings as appropriate. After reviewing your medical record and screening and assessments performed today your provider may have ordered immunizations, labs, imaging, and/or referrals for you. A list of these orders (if applicable) as well as your Preventive Care list are included within your After Visit Summary for your review. Other Preventive Recommendations:    A preventive eye exam performed by an eye specialist is recommended every 1-2 years to screen for glaucoma; cataracts, macular degeneration, and other eye disorders. A preventive dental visit is recommended every 6 months. Try to get at least 150 minutes of exercise per week or 10,000 steps per day on a pedometer . Order or download the FREE \"Exercise & Physical Activity: Your Everyday Guide\" from [employer]. Call 7-516.166.1263 or search [employer] online. You need 6426-5478 mg of calcium and 0459-2681 IU of vitamin D per day. It is possible to meet your calcium requirement with diet alone, but a vitamin D supplement is usually necessary to meet this goal.  When exposed to the sun, use a sunscreen that protects against both UVA and UVB radiation with an SPF of 30 or greater. Reapply every 2 to 3 hours or after sweating, drying off with a towel, or swimming. Always wear a seat belt when traveling in a car. Always wear a helmet when riding a bicycle or motorcycle.

## 2021-04-13 ENCOUNTER — TELEPHONE (OUTPATIENT)
Dept: PULMONOLOGY | Age: 46
End: 2021-04-13

## 2021-04-13 NOTE — TELEPHONE ENCOUNTER
Submitted PA for Spiriva Respimat 1.25MCG/ACT aerosol, Key: [id number]. Medication has been Approved. This drug has been approved under the Member's Medicare Part D benefit. Approved quantity: 2 units per 30 day(s). You may fill up to a 90 day supply except for those on Specialty Tier 5, which can be filled up to a 30 day supply. Please call the pharmacy to process the prescription claim. Please notify patient. Thank you.

## 2021-04-13 NOTE — TELEPHONE ENCOUNTER
Within this Telehealth Consent, the terms you and yours refer to the person using the Telehealth Service (Service), or in the case of a use of the Service by or on behalf of a minor, you and yours refer to and include (i) the parent or legal guardian who provides consent to the use of the Service by such minor or uses the Service on behalf of such minor, and (ii) the minor for whom consent is being provided or on whose behalf the Service is being utilized. When using Service, you will be consulting with your health care providers via the use of Telehealth.   Telehealth involves the delivery of healthcare services using electronic communications, information technology or other means between a healthcare provider and a patient who are not in the same physical location. Telehealth may be used for diagnosis, treatment, follow-up and/or patient education, and may include, but is not limited to, one or more of the following:    Electronic transmission of medical records, photo images, personal health information or other data between a patient and a healthcare provider    Interactions between a patient and healthcare provider via audio, video and/or data communications    Use of output data from medical devices, sound and video files    Anticipated Benefits   The use of Telehealth by your Provider(s) through the Service may have the following possible benefits:    Making it easier and more efficient for you to access medical care and treatment for the conditions treated by such Provider(s) utilizing the Service    Allowing you to obtain medical care and treatment by Provider(s) at times that are convenient for you    Enabling you to interact with Provider(s) without the necessity of an in-office appointment     Possible Risks   While the use of Telehealth can provide potential benefits for you, there are also potential risks associated with the use of Telehealth.  These risks include, but may not be limited to the following:    Your Provider(s) may not able to provide medical treatment for your particular condition and you may be required to seek alternative healthcare or emergency care services.  The electronic systems or other security protocols or safeguards used in the Service could fail, causing a breach of privacy of your medical or other information.  Given regulatory requirements in certain jurisdictions, your Provider(s) diagnosis and/or treatment options, especially pertaining to certain prescriptions, may be limited. Acceptance   1. You understand that Services will be provided via Telehealth. This process involves the use of HIPAA compliant and secure, real-time audio-visual interfacing with a qualified and appropriately trained provider located at [facility]. 2. You understand that, under no circumstances, will this session be recorded. 3. You understand that the Provider(s) at [facility] and other clinical participants will be party to the information obtained during the Telehealth session in accordance with best medical practices. 4. You understand that the information obtained during the Telehealth session will be used to help determine the most appropriate treatment options. 5. You understand that You have the right to revoke this consent at any point in time. 6. You understand that Telehealth is voluntary, and that continued treatment is not dependent upon consent. 7. You understand that, in the event of non-consent to Telehealth services and/or technical difficulties, you will obtain services as typically provided in the absence of Telehealth technology. 8. You understand that this consent will be kept in Your medical record. 9. No potential benefits from the use of Telehealth or specific results can be guaranteed. Your condition may not be cured or improved and, in some cases, may get worse.    10. There are limitations in the provision of medical care and treatment via Telehealth and the Service and you may not be able to receive diagnosis and/or treatment through the Service for every condition for which you seek diagnosis and/or treatment. 11. There are potential risks to the use of Telehealth, including but not limited to the risks described in this Telehealth Consent. 12. Your Provider(s) have discussed the use of Telehealth and the Service with you, including the benefits and risks of such and you have provided oral consent to your Provider(s) for the use of Telehealth and the Service. 15. You understand that it is your duty to provide your Provider(s) truthful, accurate and complete information, including all relevant information regarding care that you may have received or may be receiving from other healthcare providers outside of the Service. 14. You understand that each of your Provider(s) may determine in his or sole discretion that your condition is not suitable for diagnosis and/or treatment using the Service, and that you may need to seek medical care and treatment a specialist or other healthcare provider, outside of the Service. 15. You understand that you are fully responsible for payment for all services provided by Provider(s) or through use of the Service and that you may not be able to use third-party insurance. 16. You represent that (a) you have read this Telehealth Consent carefully, (b) you understand the risks and benefits of the Service and the use of Telehealth in the medical care and treatment provided to you by Provider(s) using the Service, and (c) you have the legal capacity and authority to provide this consent for yourself and/or the minor for which you are consenting under applicable federal and state laws, including laws relating to the age of [de-identified] and/or parental/guardian consent.    17. You give your informed consent to the use of Telehealth by Provider(s) using the Service under the terms described in the Terms of Service and this Telehealth Consent. The patient was read the following statement and has consented to the visit as of 4/13/21. The patient has been scheduled for their first telehealth visit on 04/15/2021 with Dr. Oc Deluna.

## 2021-04-14 ENCOUNTER — IMMUNIZATION (OUTPATIENT)
Dept: PRIMARY CARE CLINIC | Age: 46
End: 2021-04-14
Payer: MEDICARE

## 2021-04-14 PROCEDURE — 91301 COVID-19, MODERNA VACCINE 100MCG/0.5ML DOSE: CPT

## 2021-04-14 PROCEDURE — 0011A COVID-19, MODERNA VACCINE 100MCG/0.5ML DOSE: CPT

## 2021-04-15 ENCOUNTER — VIRTUAL VISIT (OUTPATIENT)
Dept: PULMONOLOGY | Age: 46
End: 2021-04-15
Payer: MEDICARE

## 2021-04-15 DIAGNOSIS — J44.1 CHRONIC OBSTRUCTIVE PULMONARY DISEASE WITH ACUTE EXACERBATION (HCC): ICD-10-CM

## 2021-04-15 DIAGNOSIS — J41.0 SIMPLE CHRONIC BRONCHITIS (HCC): Primary | ICD-10-CM

## 2021-04-15 DIAGNOSIS — F17.200 CURRENT SMOKER: ICD-10-CM

## 2021-04-15 DIAGNOSIS — J44.9 CHRONIC OBSTRUCTIVE PULMONARY DISEASE, UNSPECIFIED COPD TYPE (HCC): ICD-10-CM

## 2021-04-15 DIAGNOSIS — Z91.199 MEDICAL NON-COMPLIANCE: ICD-10-CM

## 2021-04-15 PROCEDURE — G8926 SPIRO NO PERF OR DOC: HCPCS | Performed by: INTERNAL MEDICINE

## 2021-04-15 PROCEDURE — G8420 CALC BMI NORM PARAMETERS: HCPCS | Performed by: INTERNAL MEDICINE

## 2021-04-15 PROCEDURE — 99214 OFFICE O/P EST MOD 30 MIN: CPT | Performed by: INTERNAL MEDICINE

## 2021-04-15 PROCEDURE — 4004F PT TOBACCO SCREEN RCVD TLK: CPT | Performed by: INTERNAL MEDICINE

## 2021-04-15 PROCEDURE — G8427 DOCREV CUR MEDS BY ELIG CLIN: HCPCS | Performed by: INTERNAL MEDICINE

## 2021-04-15 PROCEDURE — 3023F SPIROM DOC REV: CPT | Performed by: INTERNAL MEDICINE

## 2021-04-15 RX ORDER — PREDNISONE 20 MG/1
20 TABLET ORAL DAILY
Qty: 10 TABLET | Refills: 0 | Status: SHIPPED | OUTPATIENT
Start: 2021-04-15 | End: 2021-04-25

## 2021-04-15 NOTE — PROGRESS NOTES
Chief Complaint/Referring Provider:  Pulmonary follow up       Virtual pulmonary visit was done for the patient to discuss the clinical status and options, patient has been using his rescue inhaler more often and was told by the PCP to follow-up in this office, apparently patient was on Symbicort and Spiriva but patient stopped taking the medications on his own and has not taken the long-acting inhalers for last 1 to 2 months time as per the patient, patient has cough with mucoid expectoration along with that patient has been having increasing shortness of breath and patient also has some wheezing, patient does not have any increasing nasal congestion now, patient does not have any epistaxis or hemoptysis, patient does not have any sore throat or odynophagia, patient does not have any dysphagia, patient does not have any fever or chills, patient does not have any palpitations or diaphoresis, patient does not have any abdominal symptoms of concern, patient continues to be a smoker in spite of being told several times not to do so, patient does not have any altered bowel habits, no hematochezia no melena, patient does not have any increased leg edema, patient does not have any other pertinent review of system of concern      Previous  HPIPatient apparently had called the office because of his symptoms, patient also wanted new tubing for his nebulizer and patient was also given some prednisone by Dr. Rosita Kolb, patient states that the prednisone is doing the trick, patient is not having that much of cough or expectoration or shortness of breath, patient still has nasal congestion, patient states especially in the morning time and he has to blow his nose and he takes his nasal spray after that, patient does not have any chest pain, patient does not have any difficulty in swallowing, no coughing or choking while eating, no odynophagia or dysphagia, patient does not have any abdominal symptoms of concern, patient does not that patient's brother-in-law who lives with him also has 5 cats, patient says that he feels tired and having no energy in the daytime, patient also states that his significant other states that he snores and also stops breathing at nighttime and makes funny noises and gas at nighttime, patient states that he has increased daytime sleepiness, patient states that he smokes 1 to 1/2 packs a day which he has been smoking for last 40 years, patient says that he rarely drinks alcohol, patient is a recovering heroin addict, patient says that he has chronic pain in the back and patient has been taking chronic pain medications, patient has Symbicort, patient also has nebulizer treatment at home and was also given albuterol inhaler by his PCP but patient does not have any rescue inhaler at this time, patient also does not rinse his mouth with water after using the Symbicort as patient says that nobody told him, patient also says that he was hospitalized earlier this year and at that time he was told that he may require supplemental oxygen at the time of discharge but since he was smoker he was not given that has been told by the patient, no other pertinent review of system of concern    Past Medical History:   Diagnosis Date    Arthritis     Back disorder     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    Back pain     missing disc    COPD (chronic obstructive pulmonary disease) ([facility] [address]     Hypoglycemia, unspecified     Knee instability     left knee unstable    Memory loss     Panic attacks     Psychiatric problem     Unspecified cerebral artery occlusion with cerebral infarction 2014    no weakness or speech problems; pt has dementia       Past Surgical History:   Procedure Laterality Date    HUMERUS FRACTURE SURGERY Right 4/1/2019    OPEN 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       CHECKPOINT; BIOMET performed by Stephane Monroy MD at [facility]       Allergies   Allergen Reactions    Mustard Seed Swelling Throat closes up       Medication list was reviewed and updated as needed in [facility]    Social History     Socioeconomic History    Marital status:      Spouse name: Madi Mcmahan Number of children: 0    Years of education: 15    Highest education level: Not on file   Occupational History    Not on file   Social Needs    Financial resource strain: Not hard at all   Shawn-Magdalena insecurity     Worry: Never true     Inability: Never true   [employer] needs     Medical: No     Non-medical: No   Tobacco Use    Smoking status: Current Every Day Smoker     Packs/day: 1.00     Years: 35.00     Pack years: 35.00     Types: Cigarettes    Smokeless tobacco: Never Used    Tobacco comment: 04/15/2021 - smokes 1-1.5 ppd   Substance and Sexual Activity    Alcohol use: Not Currently     Comment: socially; 1-2 drinks per month    Drug use: Not Currently     Types: Marijuana     Comment: Quit using heroin 4 months ago    Sexual activity: Yes     Partners: Female   Lifestyle    Physical activity     Days per week: Not on file     Minutes per session: Not on file    Stress: Not on file   Relationships    Social connections     Talks on phone: Not on file     Gets together: Not on file     Attends [employer] service: Not on file     Active member of club or organization: Not on file     Attends meetings of clubs or organizations: Not on file     Relationship status: Not on file    Intimate partner violence     Fear of current or ex partner: Not on file     Emotionally abused: Not on file     Physically abused: Not on file     Forced sexual activity: Not on file   Other Topics Concern    Not on file   Social History Narrative    Not on file       Family History   Problem Relation Age of Onset    Diabetes Mother     Cancer Mother     Cancer Father             Review of Systems same as above    Physical Exam:  There were no vitals taken for this visit.'  Constitutional:  No acute distress.   HENT:  Oropharynx is clear and moist. No thyromegaly. Nasal mucosal hyperemia and congestion along with that patient has posterior pharyngeal cobbling along with that patient has Mallampati 2   Eyes:  Conjunctivae arenormal. Pupils equal, round, and reactive to light. No scleral icterus. Neck: . No tracheal deviation present. No obvious thyroid mass. Cardiovascular:Normal rate, regular rhythm, normal heart sounds. No right ventricular heave. Nolower extremity edema. Pulmonary/Chest: No wheezes. No rales. Chest wall is not dull to percussion. Noaccessory muscle usage or stridor. Prolonged expiration with decreased breath sound density;decreased breath sound intensity  Abdominal: Soft. Bowel sounds present. No distension or hernia. Notenderness. Musculoskeletal: No cyanosis. No clubbing. No obvious joint deformity. Lymphadenopathy: No cervical or supraclavicular adenopathy. Skin: Skin is warm and dry. Has areas of vitiligo in the right hand area along with that patient has dry skin along with that patient has a tattoo  Psychiatric: Normal mood and affect. Behavior is normal.  No anxiety. Neurologic: Alert, awake and oriented. PERRL. Speech fluent,   (deferred)      Data:     Imaging:  I have reviewed radiology images personally. No orders to display     Xr Humerus Right (min 2 Views)    Result Date: 6/26/2019  Radiology exam is complete. No Radiologist dictation. Please follow up with ordering provider. ECHO-Conclusions      Summary   Normal left ventricle systolic function with an estimated ejection fraction   of 55%.   No regional wall motion abnormalities are seen.   Normal left ventricular diastolic filling pressure.   No valvular abnormalities present.   The mitral valve anterior leaflet is thickened at 0.6cm. It opens normally.   Trace mitral regurgitation. CT -Rt humerus-  1.  Comminuted, displaced distal right humeral diaphyseal fracture with   exuberant periosteal reaction and developing hypertrophic oxygen at home in the daytime   · Patient also has history of COPD prematurely and the family and patient was told that we can do an alpha-1 antitrypsin levels but patient is not interested  · Patient was also offered a pulmonary rehab on the base of the PFT and echo symptoms but patient does not want to do it   · Patient has stopped taking his long-acting inhalers which may be causing him to have increasing symptomatology  · Patient was told about the pathophysiology of COPD and how the medications that and patient was told that it is prudent for the patient to take the medications  · Symbicort and Spiriva may not be covered by his insurance and for that reason Judy Kirby was called into his pharmacy and was told to take 1 puff daily and to rinse mouth with water after use otherwise he can have hoarseness of voice oral thrush  · Patient does not need any antibiotics from pulmonary standpoint of view  · Prednisone 20 mg once a day to be taken in the morning for 10 days  · Patient cannot be subjected to CT chest at this time on the base of clinical grounds and age  · Patient was once again told about his extent of the disease process and it can cause him to have worsening quality of life and quality of life  · Patient was explained once again that he needs to be compliant with recommendations and medications and if he has any concerns to call  · patient has some allergic rhinitis for which he was told to take some saline nasal spray 2-3 times a day patient was also given a prescription for Flonase and was told to take 2 puffs each nostril once a day and was told how to take it properly not avoid epistaxis  · Patient was told about the pathophysiology and sequelae of SHRUTHI  · Albuterol inhaler or DuoNeb nebulizer at home to be taken on a as needed basis only and not on a regular basis along with Symbicort and Spiriva otherwise patient will have a increased side effects  · If patient continues to lose weight and will consider CT chest without contrast to make sure that patient does not have any pathology of concern which may be contributing to the patient's symptomatology  · Smoking cessation reinforced  · Patient got Pneumovax in September 2018  · Patient does not take flu shots  · Patient's noncompliance will cause him to have increasing patient to morbidity /which will make clear to the patient mortality  · Patient to avoid extremes of temperature pressure and humidity and also to avoid sick contacts  · Compliance may be the biggest issue which can be challenging  · Patient was again reinforced that he needs to be careful about his diet lifestyle modifications smoking habit and medications in a regular fashion otherwise he will have symptoms  · Patient to follow-up in 4 weeks time otherwise or on PRN basis    Leopoldo Clause, was evaluated through a synchronous (real-time) audio-video encounter. The patient (or guardian if applicable) is aware that this is a billable service. Verbal consent to proceed has been obtained within the past 12 months. The visit was conducted pursuant to the emergency declaration under the [address] [place] [address] authority and the [employer] General Act. Patient identification was verified, and a caregiver was present when appropriate. The patient was located in a state where the provider was credentialed to provide care. Total time spent for this encounter: Not billed by time    --Rory Pacheco MD on 4/15/2021 at 1:41 PM    An electronic signature was used to authenticate this note.

## 2021-05-25 ENCOUNTER — IMMUNIZATION (OUTPATIENT)
Dept: PRIMARY CARE CLINIC | Age: 46
End: 2021-05-25
Payer: MEDICARE

## 2021-05-25 ENCOUNTER — TELEPHONE (OUTPATIENT)
Dept: PRIMARY CARE CLINIC | Age: 46
End: 2021-05-25

## 2021-05-25 ENCOUNTER — VIRTUAL VISIT (OUTPATIENT)
Dept: PULMONOLOGY | Age: 46
End: 2021-05-25
Payer: MEDICARE

## 2021-05-25 DIAGNOSIS — F17.200 CURRENT SMOKER: ICD-10-CM

## 2021-05-25 DIAGNOSIS — J31.0 CHRONIC RHINITIS: ICD-10-CM

## 2021-05-25 DIAGNOSIS — Z91.199 MEDICAL NON-COMPLIANCE: ICD-10-CM

## 2021-05-25 DIAGNOSIS — J41.0 SIMPLE CHRONIC BRONCHITIS (HCC): Primary | ICD-10-CM

## 2021-05-25 PROCEDURE — 91301 COVID-19, MODERNA VACCINE 100MCG/0.5ML DOSE: CPT

## 2021-05-25 PROCEDURE — G8427 DOCREV CUR MEDS BY ELIG CLIN: HCPCS | Performed by: INTERNAL MEDICINE

## 2021-05-25 PROCEDURE — 99213 OFFICE O/P EST LOW 20 MIN: CPT | Performed by: INTERNAL MEDICINE

## 2021-05-25 PROCEDURE — 3023F SPIROM DOC REV: CPT | Performed by: INTERNAL MEDICINE

## 2021-05-25 PROCEDURE — 0012A COVID-19, MODERNA VACCINE 100MCG/0.5ML DOSE: CPT

## 2021-05-25 PROCEDURE — G8420 CALC BMI NORM PARAMETERS: HCPCS | Performed by: INTERNAL MEDICINE

## 2021-05-25 PROCEDURE — 4004F PT TOBACCO SCREEN RCVD TLK: CPT | Performed by: INTERNAL MEDICINE

## 2021-05-25 PROCEDURE — G8926 SPIRO NO PERF OR DOC: HCPCS | Performed by: INTERNAL MEDICINE

## 2021-05-25 NOTE — PROGRESS NOTES
Chief Complaint/Referring Provider:  Pulmonary follow up     A virtual pulmonary visit was done for the patient to discuss the clinical status and options, patient states that the Fredie Dee Dee which was given to him does not work as well as Symbicort and Spiriva, patient does have sinus congestion along with that patient also has had some head cold, patient states that he brings up some phlegm which is whitish in color with no hemoptysis, patient does not have any otalgia no ear discharge, patient has occasional headaches, patient also has had vomiting and patient ate some steak which was medium rare, patient also has had some diarrhea few days back, patient does not have any chest pain or palpitations, patient still has some occasional wheezing, patient does not have any significant confusion lethargy, patient states that no matter what he is not going to leave smoking cigarettes, patient does not have any confusion lethargy, patient does not have any change in the ambient environment any sick contacts, no other pertinent review of systems of concern      Previous  HPIVirtual pulmonary visit was done for the patient to discuss the clinical status and options, patient has been using his rescue inhaler more often and was told by the PCP to follow-up in this office, apparently patient was on Symbicort and Spiriva but patient stopped taking the medications on his own and has not taken the long-acting inhalers for last 1 to 2 months time as per the patient, patient has cough with mucoid expectoration along with that patient has been having increasing shortness of breath and patient also has some wheezing, patient does not have any increasing nasal congestion now, patient does not have any epistaxis or hemoptysis, patient does not have any sore throat or odynophagia, patient does not have any dysphagia, patient does not have any fever or chills, patient does not have any palpitations or diaphoresis, patient does not have any abdominal symptoms of concern, patient continues to be a smoker in spite of being told several times not to do so, patient does not have any altered bowel habits, no hematochezia no melena, patient does not have any increased leg edema, patient does not have any other pertinent review of system of concern      Patient apparently had called the office because of his symptoms, patient also wanted new tubing for his nebulizer and patient was also given some prednisone by Dr. Esteban Hernandez, patient states that the prednisone is doing the trick, patient is not having that much of cough or expectoration or shortness of breath, patient still has nasal congestion, patient states especially in the morning time and he has to blow his nose and he takes his nasal spray after that, patient does not have any chest pain, patient does not have any difficulty in swallowing, no coughing or choking while eating, no odynophagia or dysphagia, patient does not have any abdominal symptoms of concern, patient does not have any increasing leg edema, patient continues to be a smoker in spite of being told not to do so, patient does not have any change in the ambient environment, patient has electric heat as a source of eating at the home, patient does not have any confusion or lethargy, no other pertinent review of system of concern          Patient is a 59-year-old male who has been referred to the office for a pulmonary consult  Patient says that he has shortness of breath all the time and patient has limited exercise tolerance, patient also says that he has cough along with that patient has phlegm especially in the morning time which is normally clear, along with a cough and expectoration and shortness of breath patient also has wheezing but does not have any significant chest pain, patient on questioning further states that he has been having some increased rhinorrhea and nasal congestion along with postnasal drainage, patient also has that he was hospitalized earlier this year and at that time he was told that he may require supplemental oxygen at the time of discharge but since he was smoker he was not given that has been told by the patient, no other pertinent review of system of concern    Past Medical History:   Diagnosis Date    Arthritis     Back disorder     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    Back pain     missing disc    COPD (chronic obstructive pulmonary disease) [facility] [address]     Hypoglycemia, unspecified     Knee instability     left knee unstable    Memory loss     Panic attacks     Psychiatric problem     Unspecified cerebral artery occlusion with cerebral infarction 2014    no weakness or speech problems; pt has dementia       Past Surgical History:   Procedure Laterality Date    HUMERUS FRACTURE SURGERY Right 4/1/2019    OPEN 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       CHECKPOINT; BIOMET performed by Amanda Baldwin MD at [address]   Allergen Reactions    Mustard Seed Swelling     Throat closes up       Medication list was reviewed and updated as needed in [facility]    Social History     Socioeconomic History    Marital status:       Spouse name: Fuentes Smith Number of children: 0    Years of education: 15    Highest education level: Not on file   Occupational History    Not on file   Tobacco Use    Smoking status: Current Every Day Smoker     Packs/day: 1.00     Years: 35.00     Pack years: 35.00     Types: Cigarettes    Smokeless tobacco: Never Used    Tobacco comment: 5/25/21 - smokes 1 ppd/dlk   Vaping Use    Vaping Use: Some days    Substances: Nicotine    Devices: Pre-filled or refillable cartridge   Substance and Sexual Activity    Alcohol use: Yes     Comment: socially; 1-2 drinks per month    Drug use: Not Currently     Types: Marijuana     Comment: Quit using heroin 4 months ago    Sexual activity: Yes     Partners: Female   Other Topics Concern    Not on file   Social History Narrative    Not on file     Social Determinants of Health     Financial Resource Strain: Low Risk     Difficulty of Paying Living Expenses: Not hard at all   Food Insecurity: No Food Insecurity    Worried About Running Out of Food in the Last Year: Never true    [address] in the Last Year: Never true   Transportation Needs: No Transportation Needs    Lack of Transportation (Medical): No    Lack of Transportation (Non-Medical): No   Physical Activity:     Days of Exercise per Week:     Minutes of Exercise per Session:    Stress:     Feeling of Stress :    Social Connections:     Frequency of Communication with Friends and Family:     Frequency of Social Gatherings with Friends and Family:     Attends Yarsanism Services:     Active Member of Clubs or Organizations:     Attends Club or Organization Meetings:     Marital Status:    Intimate Partner Violence:     Fear of Current or Ex-Partner:     Emotionally Abused:     Physically Abused:     Sexually Abused:        Family History   Problem Relation Age of Onset    Diabetes Mother     Cancer Mother     Cancer Father             Review of Systems same as above    Physical Exam:  There were no vitals taken for this visit.'  Constitutional:  No acute distress. HENT:  Oropharynx is clear and moist. No thyromegaly. Nasal mucosal hyperemia and congestion along with that patient has posterior pharyngeal cobbling along with that patient has Mallampati 2   Eyes:  Conjunctivae arenormal. Pupils equal, round, and reactive to light. No scleral icterus. Neck: . No tracheal deviation present. No obvious thyroid mass. Cardiovascular:Normal rate, regular rhythm, normal heart sounds. No right ventricular heave. Nolower extremity edema. Pulmonary/Chest: No wheezes. No rales. Chest wall is not dull to percussion. Noaccessory muscle usage or stridor.   Prolonged expiration with decreased breath sound density;decreased breath sound intensity Abdominal: Soft. Bowel sounds present. No distension or hernia. Notenderness. Musculoskeletal: No cyanosis. No clubbing. No obvious joint deformity. Lymphadenopathy: No cervical or supraclavicular adenopathy. Skin: Skin is warm and dry. Has areas of vitiligo in the right hand area along with that patient has dry skin along with that patient has a tattoo  Psychiatric: Normal mood and affect. Behavior is normal.  No anxiety. Neurologic: Alert, awake and oriented. PERRL. Speech fluent,   (deferred)      Data:     Imaging:  I have reviewed radiology images personally. No orders to display     Xr Humerus Right (min 2 Views)    Result Date: 6/26/2019  Radiology exam is complete. No Radiologist dictation. Please follow up with ordering provider. ECHO-Conclusions      Summary   Normal left ventricle systolic function with an estimated ejection fraction   of 55%.   No regional wall motion abnormalities are seen.   Normal left ventricular diastolic filling pressure.   No valvular abnormalities present.   The mitral valve anterior leaflet is thickened at 0.6cm. It opens normally.   Trace mitral regurgitation. CT -Rt humerus-  1. Comminuted, displaced distal right humeral diaphyseal fracture with   exuberant periosteal reaction and developing hypertrophic callus formation   and heterotopic ossification at the margins of the fracture.  There is marked   anterior and lateral displacement of the distal humeral diaphyseal fracture   component with anterior apex angulation.  No significant central osseous   bridging or union is identified.  Findings are concerning for developing   malunion. 2. Mild degenerative changes of the right AC and glenohumeral joint. 3. Respiratory motion, parenchymal banding and atelectasis in the visualized   left lung.      PFT shows that patient has very severe COPD also patient diffusion capacity when adjusted for volume was reduced also patient has minimal restrictive lung disease Patient's sleep study shows patient does not have any significant SHRUTHI but patient does have some periodic leg movements    Patient did not do the x-ray chest    6-minute walk test does not show patient to have any exertional hypoxemia       Assessment:    1. Simple chronic bronchitis ([address]      2. Current smoker    3. 4. Chronic rhinitis    5.  Medical non compliance          Plan:   · Patient's review of system were discussed   · Patient was told about the pathophysiology of the disease process and its modifying factors  · Patient was told that it is imperative for him to stop smoking otherwise he will have increasing morbidity and mortality and can be respiratory cripple-patient is not ready for any commitment and states that he is not in a position to think about smoking cessation at this time and is not going to stop smoking until he dies as per the patient-patient was set again with the patient  · Patient was told about the PFT results along with interpretation and implications discussed once again  · Patient does not qualify for any supplemental oxygen at home in the daytime   · Patient also has history of COPD prematurely and the family and patient was told that we can do an alpha-1 antitrypsin levels but patient is not interested  · Patient was also offered a pulmonary rehab on the base of the PFT and echo symptoms but patient does not want to do it   · Patient states that Srinivasan Mosinee is not as well as Symbicort and Spiriva but patient was told that as per the epic they are not covered by his insurance but patient can follow-up with his pharmacy and call back if they are covered by his insurance then will change the Trelegy Ellipta to Symbicort and Spiriva as they were more helpful as compared to Trelegy as per the patient  · Patient does not need any antibiotics from pulmonary standpoint of view  · No need for any steroids from pulmonary standpoint of view  · Patient can be subjected to a CT chest to see the extent of emphysema but patient wants to defer  · Patient was once again told about his extent of the disease process and it can cause him to have worsening quality of life and quality of life  · Patient was explained once again that he needs to be compliant with recommendations and medications and if he has any concerns to call  · patient has some allergic rhinitis for which he was told to take some saline nasal spray 2-3 times a day patient was also given a prescription for Flonase and was told to take 2 puffs each nostril once a day and was told how to take it properly not avoid epistaxis  · Patient was told about the pathophysiology and sequelae of SHRUTHI  · Albuterol inhaler or DuoNeb nebulizer at home to be taken on a as needed basis only and not on a regular basis which may be contributing to the patient's symptomatology  · Smoking cessation reinforced  · Patient got Pneumovax in September 2018  · Patient does not take flu shots  · Patient's noncompliance will cause him to have increasing patient to morbidity /which will make clear to the patient mortality  · Patient to avoid extremes of temperature pressure and humidity and also to avoid sick contacts  · Compliance may be the biggest issue which can be challenging  · Patient was again reinforced that he needs to be careful about his diet lifestyle modifications smoking habit and medications in a regular fashion otherwise he will have symptoms  · Patient to follow-up in 3 months time otherwise or earlier if required    Francisca Speaks, was evaluated through a synchronous (real-time) audio-video encounter. The patient (or guardian if applicable) is aware that this is a billable service. Verbal consent to proceed has been obtained within the past 12 months.  The visit was conducted pursuant to the emergency declaration under the [facility] waiver authority and the [employer] and [employer] Appropriations Act. Patient identification was verified, and a caregiver was present when appropriate. The patient was located in a state where the provider was credentialed to provide care. Total time spent for this encounter: Not billed by time    --Danette Alvarado MD on 5/25/2021 at 1:06 PM    An electronic signature was used to authenticate this note.

## 2021-05-25 NOTE — PROGRESS NOTES
MA Communication: The following orders are received by verbal communication from Kimberlee Molina MD    Orders include:  3 mo fu scheduled 8/24/21.

## 2021-05-25 NOTE — PATIENT INSTRUCTIONS
Remember to bring a list of pulmonary medications and any CPAP or BiPAP machines to your next appointment with the office. Please keep all of your future appointments scheduled by [address], [facility] office. Out of respect for other patients and providers, you may be asked to reschedule your appointment if you arrive later than your scheduled appointment time. Appointments cancelled less than 24hrs in advance will be considered a no show. Patients with three missed appointments within 1 year or four missed appointments within 2 years can be dismissed from the practice. Please be aware that our physicians are required to work in the Intensive Care Unit at [facility].  Your appointment may need to be rescheduled if they are designated to work during your appointment time. You may receive a survey regarding the care you received during your visit. Your input is valuable to us. We encourage you to complete and return your survey. We hope you will choose us in the future for your healthcare needs. Pt instructed of all future appointment dates & times, including radiology, labs, procedures & referrals. If procedures were scheduled preparation instructions provided. Instructions on future appointments with [facility] were given.

## 2021-05-25 NOTE — TELEPHONE ENCOUNTER
I tried reaching Kim Rust but he hung up the phone on me. A message was left for Kim Rust in regards to his second Covid 23 Vaccine. He missed hIs appointment.

## 2021-06-03 ENCOUNTER — TELEPHONE (OUTPATIENT)
Dept: PULMONOLOGY | Age: 46
End: 2021-06-03

## 2021-06-03 DIAGNOSIS — J44.9 CHRONIC OBSTRUCTIVE PULMONARY DISEASE, UNSPECIFIED COPD TYPE (HCC): ICD-10-CM

## 2021-06-03 RX ORDER — BUDESONIDE AND FORMOTEROL FUMARATE DIHYDRATE 160; 4.5 UG/1; UG/1
AEROSOL RESPIRATORY (INHALATION)
Qty: 10.2 G | Refills: 5 | Status: SHIPPED | OUTPATIENT
Start: 2021-06-03 | End: 2021-12-27

## 2021-06-03 RX ORDER — TIOTROPIUM BROMIDE 18 UG/1
18 CAPSULE ORAL; RESPIRATORY (INHALATION) DAILY
Qty: 90 CAPSULE | Refills: 1 | Status: SHIPPED | OUTPATIENT
Start: 2021-06-03

## 2021-06-28 DIAGNOSIS — M54.6 CHRONIC BILATERAL THORACIC BACK PAIN: ICD-10-CM

## 2021-06-28 DIAGNOSIS — G89.29 CHRONIC BILATERAL THORACIC BACK PAIN: ICD-10-CM

## 2021-06-28 RX ORDER — GABAPENTIN 600 MG/1
TABLET ORAL
Qty: 180 TABLET | Refills: 2 | Status: SHIPPED | OUTPATIENT
Start: 2021-06-28 | End: 2021-10-04

## 2021-06-28 NOTE — TELEPHONE ENCOUNTER
Refill Request     Last Seen: Last Seen Department: 4/12/2021  Last Seen by PCP: 4/12/2021    Last Written: 4/1/2021    Next Appointment:   Future Appointments   Date Time Provider Varsha Solitario   8/24/2021  1:40 PM Linda Castañeda MD [facility]   10/14/2021 11:00 AM DO ADONAY Sosa  [place] [facility]       Future appointment scheduled      Requested Prescriptions     Pending Prescriptions Disp Refills    gabapentin (NEURONTIN) 600 MG tablet [Pharmacy Med Name: Gabapentin 600MG TABS] 180 tablet 2     Sig: TAKE 2 TABLETS BY MOUTH THREE TIMES A DAY AS NEEDED FOR BACK PAIN

## 2021-07-29 ENCOUNTER — TELEPHONE (OUTPATIENT)
Dept: FAMILY MEDICINE CLINIC | Age: 46
End: 2021-07-29

## 2021-07-29 NOTE — TELEPHONE ENCOUNTER
----- Message from Tiffani Kiran sent at 7/29/2021  8:30 AM EDT -----  Subject: Message to Provider    QUESTIONS  Information for Provider? Mr. Lina Us called and wants to know if Dr. Sherlyn Mejia is taking new pt. He is calling for his 28 yr old daughter. ---------------------------------------------------------------------------  --------------  Aretta Kill INFO  What is the best way for the office to contact you? OK to leave message on   voicemail  Preferred Call Back Phone Number?  [phone]  ---------------------------------------------------------------------------  --------------  SCRIPT ANSWERS  undefined

## 2021-09-13 DIAGNOSIS — F17.210 CIGARETTE NICOTINE DEPENDENCE WITHOUT COMPLICATION: ICD-10-CM

## 2021-09-13 RX ORDER — ALBUTEROL SULFATE 90 UG/1
AEROSOL, METERED RESPIRATORY (INHALATION)
Qty: 8.5 G | Refills: 11 | Status: SHIPPED | OUTPATIENT
Start: 2021-09-13 | End: 2022-09-26 | Stop reason: SDUPTHER

## 2021-09-13 NOTE — TELEPHONE ENCOUNTER
----- Message from Maranda Brown sent at 9/13/2021  9:29 AM EDT -----  Subject: Refill Request    QUESTIONS  Name of Medication? albuterol sulfate  (90 Base) MCG/ACT inhaler  Patient-reported dosage and instructions? as needed  How many days do you have left? 0  Preferred Pharmacy? [id number] phone number (if available)? [phone]  ---------------------------------------------------------------------------  --------------  Qi OCHOA  What is the best way for the office to contact you? OK to leave message on   voicemail  Preferred Call Back Phone Number?  [phone]

## 2021-09-13 NOTE — TELEPHONE ENCOUNTER
.  Last office visit 4/12/2021     Last written 8/25/2020 8.5 g with 11      Next office visit scheduled 10/14/2021    Requested Prescriptions     Pending Prescriptions Disp Refills    albuterol sulfate  (90 Base) MCG/ACT inhaler 8.5 g 11     Sig: INHALE 2 PUFFS EVERY 6 HOURS AS NEEDED WHEEZING

## 2021-10-04 DIAGNOSIS — G89.29 CHRONIC BILATERAL THORACIC BACK PAIN: ICD-10-CM

## 2021-10-04 DIAGNOSIS — M54.6 CHRONIC BILATERAL THORACIC BACK PAIN: ICD-10-CM

## 2021-10-04 RX ORDER — GABAPENTIN 600 MG/1
TABLET ORAL
Qty: 180 TABLET | Refills: 2 | Status: SHIPPED | OUTPATIENT
Start: 2021-10-04 | End: 2021-12-27

## 2021-10-04 NOTE — TELEPHONE ENCOUNTER
Refill Request     Last Seen: Last Seen Department: 4/12/2021  Last Seen by PCP: 4/12/2021    Last Written: 6/28/21 180 tablet 2 refill     Next Appointment: 10/5/21     Future Appointments   Date Time Provider Varsha Kassi   10/5/2021 11:00 AM Yanelis Martin MD [facility]   10/14/2021 11:00 AM DO ADONAY Sosa   10/28/2021 10:30 AM DO ADONAY Sosa  [place] [facility]       Future appointment scheduled      Requested Prescriptions     Pending Prescriptions Disp Refills    gabapentin (NEURONTIN) 600 MG tablet [Pharmacy Med Name: Gabapentin 600MG TABS] 180 tablet 2     Sig: TAKE 2 TABLETS BY MOUTH THREE TIMES A DAY AS NEEDED FOR BACK PAIN

## 2021-10-05 ENCOUNTER — TELEPHONE (OUTPATIENT)
Dept: PULMONOLOGY | Age: 46
End: 2021-10-05

## 2021-10-05 NOTE — TELEPHONE ENCOUNTER
Patient same day cancelled appt (no show) for 3 mo fu  with Dr. Tom Alegria on 10/5/21. Reason:  Buel Art to wrong location    This is patient's third no show. Patient was a no show on: /28/19, 4/15/20. Patient did reschedule.   Reschedule date:  10/8/21

## 2021-10-08 ENCOUNTER — TELEPHONE (OUTPATIENT)
Dept: PULMONOLOGY | Age: 46
End: 2021-10-08

## 2021-10-08 NOTE — TELEPHONE ENCOUNTER
Patient did not show for 3 mo  appointment  with Dr. Fara Garrett on 10/8/21    Same Day Cancellation: No    Patient rescheduled:  No        Patient was also no show on: 8/28/19, 4/15/20,10/5/21    This is the 3rd No Show in 1 year , please advise?

## 2021-10-28 ENCOUNTER — OFFICE VISIT (OUTPATIENT)
Dept: FAMILY MEDICINE CLINIC | Age: 46
End: 2021-10-28
Payer: MEDICARE

## 2021-10-28 VITALS
SYSTOLIC BLOOD PRESSURE: 116 MMHG | DIASTOLIC BLOOD PRESSURE: 70 MMHG | OXYGEN SATURATION: 98 % | BODY MASS INDEX: 20.84 KG/M2 | HEART RATE: 72 BPM | WEIGHT: 137 LBS

## 2021-10-28 DIAGNOSIS — Z12.11 SCREENING FOR COLON CANCER: ICD-10-CM

## 2021-10-28 DIAGNOSIS — E78.2 MIXED HYPERLIPIDEMIA: ICD-10-CM

## 2021-10-28 DIAGNOSIS — F33.3 SEVERE RECURRENT MAJOR DEPRESSIVE DISORDER WITH PSYCHOTIC FEATURES (HCC): ICD-10-CM

## 2021-10-28 DIAGNOSIS — F17.210 CIGARETTE NICOTINE DEPENDENCE WITHOUT COMPLICATION: ICD-10-CM

## 2021-10-28 DIAGNOSIS — G89.29 CHRONIC BILATERAL THORACIC BACK PAIN: ICD-10-CM

## 2021-10-28 DIAGNOSIS — J44.9 CHRONIC OBSTRUCTIVE PULMONARY DISEASE, UNSPECIFIED COPD TYPE (HCC): Primary | ICD-10-CM

## 2021-10-28 DIAGNOSIS — M54.6 CHRONIC BILATERAL THORACIC BACK PAIN: ICD-10-CM

## 2021-10-28 DIAGNOSIS — E55.9 VITAMIN D DEFICIENCY: ICD-10-CM

## 2021-10-28 LAB
A/G RATIO: 2.1 (ref 1.1–2.2)
ALBUMIN SERPL-MCNC: 4.5 G/DL (ref 3.4–5)
ALP BLD-CCNC: 90 U/L (ref 40–129)
ALT SERPL-CCNC: 11 U/L (ref 10–40)
ANION GAP SERPL CALCULATED.3IONS-SCNC: 16 MMOL/L (ref 3–16)
AST SERPL-CCNC: 17 U/L (ref 15–37)
BASOPHILS ABSOLUTE: 0.1 K/UL (ref 0–0.2)
BASOPHILS RELATIVE PERCENT: 1.1 %
BILIRUB SERPL-MCNC: 0.4 MG/DL (ref 0–1)
BUN BLDV-MCNC: 10 MG/DL (ref 7–20)
CALCIUM SERPL-MCNC: 9.3 MG/DL (ref 8.3–10.6)
CHLORIDE BLD-SCNC: 97 MMOL/L (ref 99–110)
CHOLESTEROL, TOTAL: 184 MG/DL (ref 0–199)
CO2: 29 MMOL/L (ref 21–32)
CREAT SERPL-MCNC: 0.9 MG/DL (ref 0.9–1.3)
EOSINOPHILS ABSOLUTE: 0.1 K/UL (ref 0–0.6)
EOSINOPHILS RELATIVE PERCENT: 1.4 %
FOLATE: 6.4 NG/ML (ref 4.78–24.2)
GFR AFRICAN AMERICAN: >60
GFR NON-AFRICAN AMERICAN: >60
GLUCOSE BLD-MCNC: 77 MG/DL (ref 70–99)
HCT VFR BLD CALC: 47.8 % (ref 40.5–52.5)
HDLC SERPL-MCNC: 42 MG/DL (ref 40–60)
HEMOGLOBIN: 15.9 G/DL (ref 13.5–17.5)
LDL CHOLESTEROL CALCULATED: 119 MG/DL
LYMPHOCYTES ABSOLUTE: 1.1 K/UL (ref 1–5.1)
LYMPHOCYTES RELATIVE PERCENT: 16.7 %
MCH RBC QN AUTO: 31.8 PG (ref 26–34)
MCHC RBC AUTO-ENTMCNC: 33.3 G/DL (ref 31–36)
MCV RBC AUTO: 95.6 FL (ref 80–100)
MONOCYTES ABSOLUTE: 0.7 K/UL (ref 0–1.3)
MONOCYTES RELATIVE PERCENT: 10.4 %
NEUTROPHILS ABSOLUTE: 4.4 K/UL (ref 1.7–7.7)
NEUTROPHILS RELATIVE PERCENT: 70.4 %
PDW BLD-RTO: 13.9 % (ref 12.4–15.4)
PLATELET # BLD: 195 K/UL (ref 135–450)
PMV BLD AUTO: 9.3 FL (ref 5–10.5)
POTASSIUM SERPL-SCNC: 4.5 MMOL/L (ref 3.5–5.1)
RBC # BLD: 5 M/UL (ref 4.2–5.9)
SODIUM BLD-SCNC: 142 MMOL/L (ref 136–145)
TOTAL PROTEIN: 6.6 G/DL (ref 6.4–8.2)
TRIGL SERPL-MCNC: 115 MG/DL (ref 0–150)
TSH REFLEX: 0.56 UIU/ML (ref 0.27–4.2)
VITAMIN B-12: 241 PG/ML (ref 211–911)
VITAMIN D 25-HYDROXY: 16.8 NG/ML
VLDLC SERPL CALC-MCNC: 23 MG/DL
WBC # BLD: 6.3 K/UL (ref 4–11)

## 2021-10-28 PROCEDURE — G8484 FLU IMMUNIZE NO ADMIN: HCPCS | Performed by: FAMILY MEDICINE

## 2021-10-28 PROCEDURE — 4004F PT TOBACCO SCREEN RCVD TLK: CPT | Performed by: FAMILY MEDICINE

## 2021-10-28 PROCEDURE — G8420 CALC BMI NORM PARAMETERS: HCPCS | Performed by: FAMILY MEDICINE

## 2021-10-28 PROCEDURE — G8926 SPIRO NO PERF OR DOC: HCPCS | Performed by: FAMILY MEDICINE

## 2021-10-28 PROCEDURE — 99214 OFFICE O/P EST MOD 30 MIN: CPT | Performed by: FAMILY MEDICINE

## 2021-10-28 PROCEDURE — G8428 CUR MEDS NOT DOCUMENT: HCPCS | Performed by: FAMILY MEDICINE

## 2021-10-28 PROCEDURE — 3023F SPIROM DOC REV: CPT | Performed by: FAMILY MEDICINE

## 2021-10-28 ASSESSMENT — ENCOUNTER SYMPTOMS: BACK PAIN: 1

## 2021-10-28 NOTE — LETTER
[facility] [address] [place] [address]  [address] [place] [address]  Phone: [phone]  Fax: [phone]    Rosmery Churchill DO        October 28, 2021     Patient: Irwin Fee   YOB: 1975   Date of Visit: 10/28/2021       To Whom It May Concern: It is my medical opinion that Abril Cruz has uncontrolled COPD and develops shortness of breath while wearing a seat belt. He also has anxiety and develops panic attacks while wearing a seat belt. We are working on improving his COPD and anxiety. If you have any questions or concerns, please don't hesitate to call.     Sincerely,    Rosmery Churchill DO

## 2021-10-28 NOTE — PROGRESS NOTES
Jose Lewis is a 50 y.o. male    Chief Complaint   Patient presents with    COPD     needs a note to not wear seatbelt     Depression    Anxiety    Back Pain    Insomnia    Nicotine Dependence       HPI:    HPI    COPD   This is a chronic problem. The current episode started more than 1 year ago. The problem occurs daily. The problem has been gradually worsening. His symptoms are aggravated by any activity. His symptoms are alleviated by beta-agonist. He reports moderate improvement on treatment. His past medical history is significant for COPD.      Major depression with psychotic features. This is a chronic condition. Depression is stable. He takes Prozac daily. He also takes Risperdal at night. Helps calm racing thoughts. Used to hear voices and have visual hallucinations but none since being on Risperdal. Anxiety is present while wearing a seatbelt.      Chronic back pain. This is a chronic problem. The current episode started more than 1 year ago. The problem occurs constantly. The problem is unchanged. The pain is present in the lumbar spine. Associated symptoms include tingling. Risk factors include sedentary lifestyle. Treatments tried: gabapentin. The treatment provided significant relief.      Insomnia, primary. This is a chronic condition. Initially, Trazodone did not help but it has lately been helping. Also taking Risperidone as well.     Nicotine dependence. Smokes 1 PPD for 40 years. No desire to quit smoking. He does have underlying mental illness as noted above, making Chantix a difficult option. ROS:    Review of Systems   Musculoskeletal: Positive for back pain. Psychiatric/Behavioral: Positive for dysphoric mood. /70   Pulse 72   Wt 137 lb (62.1 kg)   SpO2 98%   BMI 20.84 kg/m²     Physical Exam:    Physical Exam  Constitutional:       General: He is not in acute distress. Appearance: Normal appearance. He is normal weight.  He is not ill-appearing or toxic-appearing. HENT:      Head: Normocephalic. Neurological:      Mental Status: He is alert. Psychiatric:         Mood and Affect: Mood normal.         Behavior: Behavior normal.         Thought Content: Thought content normal.         Current Outpatient Medications   Medication Sig Dispense Refill    gabapentin (NEURONTIN) 600 MG tablet TAKE 2 TABLETS BY MOUTH THREE TIMES A DAY AS NEEDED FOR BACK PAIN 180 tablet 2    albuterol sulfate  (90 Base) MCG/ACT inhaler INHALE 2 PUFFS EVERY 6 HOURS AS NEEDED WHEEZING 8.5 g 11    budesonide-formoterol (SYMBICORT) 160-4.5 MCG/ACT AERO INHALE 2 PUFFS INTO THE LUNGS TWICE A DAY 10.2 g 5    tiotropium (SPIRIVA HANDIHALER) 18 MCG inhalation capsule Inhale 1 capsule into the lungs daily 90 capsule 1    Fluticasone-Umeclidin-Vilant 200-62.5-25 MCG/INH AEPB Inhale 1 puff into the lungs daily 1 each 5    ibuprofen (ADVIL;MOTRIN) 800 MG tablet TAKE 1 TABLET BY MOUTH EVERY 8 HOURS AS NEEDED PAIN WITH FOOD 60 tablet 0    Respiratory Therapy Supplies (NEBULIZER/TUBING/MOUTHPIECE) KIT 1 kit by Does not apply route daily as needed (Patient is needing new equipment) 1 kit 0    risperiDONE (RISPERDAL) 3 MG tablet       ipratropium-albuterol (DUONEB) 0.5-2.5 (3) MG/3ML SOLN nebulizer solution Take 3 mLs by nebulization every 4 hours 1 vial 0    aspirin 325 MG tablet Take 325 mg by mouth daily      fluticasone (FLONASE) 50 MCG/ACT nasal spray 1 spray by Each Nostril route daily 2 Bottle 1    traZODone (DESYREL) 100 MG tablet Take 0.5-1 tablets by mouth nightly as needed for Sleep 90 tablet 1    FLUoxetine (PROZAC) 40 MG capsule Take 1 capsule by mouth daily 30 capsule 5    buprenorphine (SUBUTEX) 2 MG SUBL SL tablet Place 16 mg under the tongue daily.        Nebulizer MISC by Does not apply route daily      topiramate (TOPAMAX) 50 MG tablet Take 50 mg by mouth daily       donepezil (ARICEPT) 5 MG tablet Take 1 tablet by mouth nightly (Patient taking differently: Take 10 mg by mouth nightly ) 12 tablet 0     No current facility-administered medications for this visit. Assessment:    1. Chronic obstructive pulmonary disease, unspecified COPD type ([address]    2. Chronic bilateral thoracic back pain    3. Cigarette nicotine dependence without complication    4. Severe recurrent major depressive disorder with psychotic features ([facility]    5. Mixed hyperlipidemia    6. Screening for colon cancer    7. Vitamin D deficiency        Plan:    1. Chronic obstructive pulmonary disease, unspecified COPD type ([facility]  Stable. Continue current medications. Given a letter that described the shortness of breath and anxiety he has while wearing a seatbelt but discussed how the seatbelt is the state law and ideally it should be worn. - CBC Auto Differential  - Comprehensive Metabolic Panel    2. Chronic bilateral thoracic back pain  Stable. Continue current medications. 3. Cigarette nicotine dependence without complication  No desire to quit smoking. Encouraged tobacco cessation nevertheless. 4. Severe recurrent major depressive disorder with psychotic features ([facility]  Stable. Continue current medications. 5. Mixed hyperlipidemia  The 10-year ASCVD risk score (Kimberly Alejandro., et al., 2013) is: 5.6%    Values used to calculate the score:      Age: 50 years      Sex: Male      Is Non- : No      Diabetic: No      Tobacco smoker: Yes      Systolic Blood Pressure: 806 mmHg      Is BP treated: No      HDL Cholesterol: 39 mg/dL      Total Cholesterol: 173 mg/dL    Flu shot declined. Return in about 6 months (around 4/28/2022) for COPD, Low Back Pain, Tobacco abuse, Mood disorder.

## 2021-12-27 DIAGNOSIS — J44.9 CHRONIC OBSTRUCTIVE PULMONARY DISEASE, UNSPECIFIED COPD TYPE (HCC): ICD-10-CM

## 2021-12-27 RX ORDER — BUDESONIDE AND FORMOTEROL FUMARATE DIHYDRATE 160; 4.5 UG/1; UG/1
AEROSOL RESPIRATORY (INHALATION)
Qty: 1 EACH | Refills: 5 | Status: SHIPPED | OUTPATIENT
Start: 2021-12-27 | End: 2022-07-27 | Stop reason: SDUPTHER

## 2021-12-30 ENCOUNTER — TELEPHONE (OUTPATIENT)
Dept: FAMILY MEDICINE CLINIC | Age: 46
End: 2021-12-30

## 2022-01-03 DIAGNOSIS — M54.6 CHRONIC BILATERAL THORACIC BACK PAIN: ICD-10-CM

## 2022-01-03 DIAGNOSIS — G89.29 CHRONIC BILATERAL THORACIC BACK PAIN: ICD-10-CM

## 2022-01-03 RX ORDER — GABAPENTIN 600 MG/1
TABLET ORAL
Qty: 180 TABLET | Refills: 2 | Status: SHIPPED | OUTPATIENT
Start: 2022-01-03 | End: 2022-04-25

## 2022-01-03 NOTE — TELEPHONE ENCOUNTER
Refill Request     Last Seen: Last Seen Department: 10/28/2021  Last Seen by PCP: 10/28/2021    Last Written: 12/29/21    Next Appointment:   Future Appointments   Date Time Provider Varsha Solitario   4/28/2022  2:00 PM DO ADONAY Sosa  [place] [facility]       Future appointment scheduled      Requested Prescriptions     Pending Prescriptions Disp Refills    gabapentin (NEURONTIN) 600 MG tablet [Pharmacy Med Name: Gabapentin 600MG TABS] 180 tablet 2     Sig: TAKE 2 TABLETS BY MOUTH THREE TIMES A DAY AS NEEDED FOR BACK PAIN

## 2022-04-04 ENCOUNTER — TELEPHONE (OUTPATIENT)
Dept: FAMILY MEDICINE CLINIC | Age: 47
End: 2022-04-04

## 2022-04-04 NOTE — TELEPHONE ENCOUNTER
----- Message from Josh Kawn sent at 4/4/2022 12:41 PM EDT -----  Subject: Message to Provider    QUESTIONS  Information for Provider? Pt is requesting a fax to be sent to his fax   number at [phone] regarding paperwork for a service dog. Pt stated he   already has paperwork in regards to the specifics on what the service dog   would be for, but he just needs a simple form written stating he needs a   service dog without specifics. Any questions please call pt.   ---------------------------------------------------------------------------  --------------  CALL BACK INFO  What is the best way for the office to contact you? OK to leave message on   voicemail  Preferred Call Back Phone Number? [phone]  ---------------------------------------------------------------------------  --------------  SCRIPT ANSWERS  Relationship to Patient?  Self

## 2022-04-04 NOTE — LETTER
[address]  [facility] [address]  Phone: [phone]  Fax: [phone]    Refugio De La Paz DO        April 5, 2022     Patient: Daniella Santa   YOB: 1975   Date of Visit: 4/4/2022       To Whom It May Concern: It is my medical opinion that Duke Brady is in need of a service dog. He takes the dog everywhere with him. If you have any questions or concerns, please don't hesitate to call.     Sincerely,        Refugio De La Paz DO

## 2022-04-06 ENCOUNTER — TELEPHONE (OUTPATIENT)
Dept: FAMILY MEDICINE CLINIC | Age: 47
End: 2022-04-06

## 2022-04-06 NOTE — TELEPHONE ENCOUNTER
----- Message from Stoney Kiera sent at 4/6/2022  9:00 AM EDT -----  Subject: Message to Provider    QUESTIONS  Information for Provider? Patient wanting letter for service dog to be   faxed to [phone],  ---------------------------------------------------------------------------  --------------  CALL BACK INFO  What is the best way for the office to contact you? OK to leave message on   voicemail  Preferred Call Back Phone Number? [phone]  ---------------------------------------------------------------------------  --------------  SCRIPT ANSWERS  Relationship to Patient?  Self

## 2022-04-12 ENCOUNTER — HOSPITAL ENCOUNTER (INPATIENT)
Age: 47
LOS: 2 days | Discharge: HOME | DRG: 871 | End: 2022-04-14
Attending: STUDENT IN AN ORGANIZED HEALTH CARE EDUCATION/TRAINING PROGRAM | Admitting: INTERNAL MEDICINE
Payer: MEDICARE

## 2022-04-12 ENCOUNTER — APPOINTMENT (OUTPATIENT)
Dept: GENERAL RADIOLOGY | Age: 47
DRG: 871 | End: 2022-04-12
Payer: MEDICARE

## 2022-04-12 DIAGNOSIS — J44.1 COPD EXACERBATION (HCC): ICD-10-CM

## 2022-04-12 DIAGNOSIS — J18.9 PNEUMONIA OF RIGHT LOWER LOBE DUE TO INFECTIOUS ORGANISM: ICD-10-CM

## 2022-04-12 DIAGNOSIS — J96.01 ACUTE RESPIRATORY FAILURE WITH HYPOXIA (HCC): Primary | ICD-10-CM

## 2022-04-12 DIAGNOSIS — A41.9 SEPTICEMIA (HCC): ICD-10-CM

## 2022-04-12 DIAGNOSIS — D72.825 BANDEMIA: ICD-10-CM

## 2022-04-12 LAB
ANION GAP SERPL CALCULATED.3IONS-SCNC: 15 MMOL/L (ref 3–16)
ANISOCYTOSIS: ABNORMAL
BANDED NEUTROPHILS RELATIVE PERCENT: 10 % (ref 0–7)
BASE EXCESS VENOUS: 5.9 MMOL/L (ref -3–3)
BASOPHILS ABSOLUTE: 0 K/UL (ref 0–0.2)
BASOPHILS RELATIVE PERCENT: 0 %
BUN BLDV-MCNC: 18 MG/DL (ref 7–20)
CALCIUM SERPL-MCNC: 9 MG/DL (ref 8.3–10.6)
CARBOXYHEMOGLOBIN: 5.1 % (ref 0–1.5)
CHLORIDE BLD-SCNC: 91 MMOL/L (ref 99–110)
CO2: 31 MMOL/L (ref 21–32)
CREAT SERPL-MCNC: 1.3 MG/DL (ref 0.9–1.3)
D DIMER: 433 NG/ML DDU (ref 0–229)
EOSINOPHILS ABSOLUTE: 0 K/UL (ref 0–0.6)
EOSINOPHILS RELATIVE PERCENT: 0 %
GFR AFRICAN AMERICAN: >60
GFR NON-AFRICAN AMERICAN: 59
GLUCOSE BLD-MCNC: 188 MG/DL (ref 70–99)
HCO3 VENOUS: 30.7 MMOL/L (ref 23–29)
HCT VFR BLD CALC: 46.4 % (ref 40.5–52.5)
HEMATOLOGY PATH CONSULT: YES
HEMOGLOBIN: 15.3 G/DL (ref 13.5–17.5)
LACTIC ACID, SEPSIS: 1.2 MMOL/L (ref 0.4–1.9)
LACTIC ACID, SEPSIS: 1.4 MMOL/L (ref 0.4–1.9)
LYMPHOCYTES ABSOLUTE: 1.8 K/UL (ref 1–5.1)
LYMPHOCYTES RELATIVE PERCENT: 12 %
MACROCYTES: ABNORMAL
MCH RBC QN AUTO: 31.6 PG (ref 26–34)
MCHC RBC AUTO-ENTMCNC: 33.1 G/DL (ref 31–36)
MCV RBC AUTO: 95.3 FL (ref 80–100)
METHEMOGLOBIN VENOUS: 0.7 %
MONOCYTES ABSOLUTE: 2.2 K/UL (ref 0–1.3)
MONOCYTES RELATIVE PERCENT: 15 %
NEUTROPHILS ABSOLUTE: 10.9 K/UL (ref 1.7–7.7)
NEUTROPHILS RELATIVE PERCENT: 63 %
O2 SAT, VEN: 99 %
O2 THERAPY: ABNORMAL
OVALOCYTES: ABNORMAL
PCO2, VEN: 44.7 MMHG (ref 40–50)
PDW BLD-RTO: 13.7 % (ref 12.4–15.4)
PH VENOUS: 7.45 (ref 7.35–7.45)
PLATELET # BLD: 266 K/UL (ref 135–450)
PLATELET SLIDE REVIEW: ADEQUATE
PMV BLD AUTO: 8.2 FL (ref 5–10.5)
PO2, VEN: 140.2 MMHG (ref 25–40)
POIKILOCYTES: ABNORMAL
POLYCHROMASIA: ABNORMAL
POTASSIUM SERPL-SCNC: 4.7 MMOL/L (ref 3.5–5.1)
PRO-BNP: 646 PG/ML (ref 0–124)
RAPID INFLUENZA  B AGN: NEGATIVE
RAPID INFLUENZA A AGN: NEGATIVE
RBC # BLD: 4.86 M/UL (ref 4.2–5.9)
SARS-COV-2, NAAT: NOT DETECTED
SLIDE REVIEW: ABNORMAL
SODIUM BLD-SCNC: 137 MMOL/L (ref 136–145)
SPECIMEN STATUS: NORMAL
TCO2 CALC VENOUS: 32 MMOL/L
TROPONIN: <0.01 NG/ML
WBC # BLD: 14.9 K/UL (ref 4–11)

## 2022-04-12 PROCEDURE — 2700000000 HC OXYGEN THERAPY PER DAY

## 2022-04-12 PROCEDURE — 84484 ASSAY OF TROPONIN QUANT: CPT

## 2022-04-12 PROCEDURE — 6370000000 HC RX 637 (ALT 250 FOR IP): Performed by: NURSE PRACTITIONER

## 2022-04-12 PROCEDURE — 71045 X-RAY EXAM CHEST 1 VIEW: CPT

## 2022-04-12 PROCEDURE — 85025 COMPLETE CBC W/AUTO DIFF WBC: CPT

## 2022-04-12 PROCEDURE — 87635 SARS-COV-2 COVID-19 AMP PRB: CPT

## 2022-04-12 PROCEDURE — 87804 INFLUENZA ASSAY W/OPTIC: CPT

## 2022-04-12 PROCEDURE — 93005 ELECTROCARDIOGRAM TRACING: CPT | Performed by: STUDENT IN AN ORGANIZED HEALTH CARE EDUCATION/TRAINING PROGRAM

## 2022-04-12 PROCEDURE — 80048 BASIC METABOLIC PNL TOTAL CA: CPT

## 2022-04-12 PROCEDURE — 83605 ASSAY OF LACTIC ACID: CPT

## 2022-04-12 PROCEDURE — 94761 N-INVAS EAR/PLS OXIMETRY MLT: CPT

## 2022-04-12 PROCEDURE — 2580000003 HC RX 258: Performed by: PHYSICIAN ASSISTANT

## 2022-04-12 PROCEDURE — 99285 EMERGENCY DEPT VISIT HI MDM: CPT

## 2022-04-12 PROCEDURE — 82803 BLOOD GASES ANY COMBINATION: CPT

## 2022-04-12 PROCEDURE — 85379 FIBRIN DEGRADATION QUANT: CPT

## 2022-04-12 PROCEDURE — 96365 THER/PROPH/DIAG IV INF INIT: CPT

## 2022-04-12 PROCEDURE — 83880 ASSAY OF NATRIURETIC PEPTIDE: CPT

## 2022-04-12 PROCEDURE — 96375 TX/PRO/DX INJ NEW DRUG ADDON: CPT

## 2022-04-12 PROCEDURE — 36415 COLL VENOUS BLD VENIPUNCTURE: CPT

## 2022-04-12 PROCEDURE — 6360000002 HC RX W HCPCS: Performed by: PHYSICIAN ASSISTANT

## 2022-04-12 PROCEDURE — 87040 BLOOD CULTURE FOR BACTERIA: CPT

## 2022-04-12 PROCEDURE — 1200000000 HC SEMI PRIVATE

## 2022-04-12 PROCEDURE — 6370000000 HC RX 637 (ALT 250 FOR IP): Performed by: PHYSICIAN ASSISTANT

## 2022-04-12 RX ORDER — ONDANSETRON 2 MG/ML
4 INJECTION INTRAMUSCULAR; INTRAVENOUS EVERY 6 HOURS PRN
Status: DISCONTINUED | OUTPATIENT
Start: 2022-04-12 | End: 2022-04-14 | Stop reason: HOSPADM

## 2022-04-12 RX ORDER — SODIUM CHLORIDE 0.9 % (FLUSH) 0.9 %
5-40 SYRINGE (ML) INJECTION EVERY 12 HOURS SCHEDULED
Status: DISCONTINUED | OUTPATIENT
Start: 2022-04-12 | End: 2022-04-14 | Stop reason: HOSPADM

## 2022-04-12 RX ORDER — FLUTICASONE PROPIONATE 50 MCG
1 SPRAY, SUSPENSION (ML) NASAL DAILY
Status: DISCONTINUED | OUTPATIENT
Start: 2022-04-13 | End: 2022-04-14 | Stop reason: HOSPADM

## 2022-04-12 RX ORDER — IPRATROPIUM BROMIDE AND ALBUTEROL SULFATE 2.5; .5 MG/3ML; MG/3ML
1 SOLUTION RESPIRATORY (INHALATION) EVERY 4 HOURS
Status: DISCONTINUED | OUTPATIENT
Start: 2022-04-12 | End: 2022-04-13

## 2022-04-12 RX ORDER — ONDANSETRON 4 MG/1
4 TABLET, ORALLY DISINTEGRATING ORAL EVERY 8 HOURS PRN
Status: DISCONTINUED | OUTPATIENT
Start: 2022-04-12 | End: 2022-04-14 | Stop reason: HOSPADM

## 2022-04-12 RX ORDER — POLYETHYLENE GLYCOL 3350 17 G/17G
17 POWDER, FOR SOLUTION ORAL DAILY PRN
Status: DISCONTINUED | OUTPATIENT
Start: 2022-04-12 | End: 2022-04-14 | Stop reason: HOSPADM

## 2022-04-12 RX ORDER — SODIUM CHLORIDE 9 MG/ML
INJECTION, SOLUTION INTRAVENOUS PRN
Status: DISCONTINUED | OUTPATIENT
Start: 2022-04-12 | End: 2022-04-14 | Stop reason: HOSPADM

## 2022-04-12 RX ORDER — ALBUTEROL SULFATE 90 UG/1
2 AEROSOL, METERED RESPIRATORY (INHALATION) EVERY 4 HOURS PRN
Status: DISCONTINUED | OUTPATIENT
Start: 2022-04-12 | End: 2022-04-14 | Stop reason: HOSPADM

## 2022-04-12 RX ORDER — AZITHROMYCIN 250 MG/1
500 TABLET, FILM COATED ORAL EVERY 24 HOURS
Status: DISCONTINUED | OUTPATIENT
Start: 2022-04-13 | End: 2022-04-14 | Stop reason: HOSPADM

## 2022-04-12 RX ORDER — 0.9 % SODIUM CHLORIDE 0.9 %
30 INTRAVENOUS SOLUTION INTRAVENOUS ONCE
Status: COMPLETED | OUTPATIENT
Start: 2022-04-12 | End: 2022-04-12

## 2022-04-12 RX ORDER — SODIUM CHLORIDE 0.9 % (FLUSH) 0.9 %
5-40 SYRINGE (ML) INJECTION PRN
Status: DISCONTINUED | OUTPATIENT
Start: 2022-04-12 | End: 2022-04-14 | Stop reason: HOSPADM

## 2022-04-12 RX ORDER — ACETAMINOPHEN 650 MG/1
650 SUPPOSITORY RECTAL EVERY 6 HOURS PRN
Status: DISCONTINUED | OUTPATIENT
Start: 2022-04-12 | End: 2022-04-14 | Stop reason: HOSPADM

## 2022-04-12 RX ORDER — DONEPEZIL HYDROCHLORIDE 5 MG/1
10 TABLET, FILM COATED ORAL NIGHTLY
Status: DISCONTINUED | OUTPATIENT
Start: 2022-04-12 | End: 2022-04-14 | Stop reason: HOSPADM

## 2022-04-12 RX ORDER — TOPIRAMATE 25 MG/1
50 TABLET ORAL DAILY
Status: DISCONTINUED | OUTPATIENT
Start: 2022-04-13 | End: 2022-04-14 | Stop reason: HOSPADM

## 2022-04-12 RX ORDER — FLUOXETINE HYDROCHLORIDE 20 MG/1
40 CAPSULE ORAL DAILY
Status: DISCONTINUED | OUTPATIENT
Start: 2022-04-13 | End: 2022-04-14 | Stop reason: HOSPADM

## 2022-04-12 RX ORDER — ACETAMINOPHEN 500 MG
1000 TABLET ORAL ONCE
Status: COMPLETED | OUTPATIENT
Start: 2022-04-12 | End: 2022-04-12

## 2022-04-12 RX ORDER — BUDESONIDE AND FORMOTEROL FUMARATE DIHYDRATE 160; 4.5 UG/1; UG/1
2 AEROSOL RESPIRATORY (INHALATION) 2 TIMES DAILY
Status: DISCONTINUED | OUTPATIENT
Start: 2022-04-12 | End: 2022-04-14 | Stop reason: HOSPADM

## 2022-04-12 RX ORDER — METHYLPREDNISOLONE SODIUM SUCCINATE 125 MG/2ML
60 INJECTION, POWDER, LYOPHILIZED, FOR SOLUTION INTRAMUSCULAR; INTRAVENOUS ONCE
Status: COMPLETED | OUTPATIENT
Start: 2022-04-12 | End: 2022-04-12

## 2022-04-12 RX ORDER — RISPERIDONE 1 MG/1
3 TABLET, FILM COATED ORAL NIGHTLY
Status: DISCONTINUED | OUTPATIENT
Start: 2022-04-12 | End: 2022-04-14 | Stop reason: HOSPADM

## 2022-04-12 RX ORDER — ASPIRIN 325 MG
325 TABLET ORAL DAILY
Status: DISCONTINUED | OUTPATIENT
Start: 2022-04-13 | End: 2022-04-14

## 2022-04-12 RX ORDER — METHYLPREDNISOLONE SODIUM SUCCINATE 40 MG/ML
40 INJECTION, POWDER, LYOPHILIZED, FOR SOLUTION INTRAMUSCULAR; INTRAVENOUS EVERY 12 HOURS
Status: DISCONTINUED | OUTPATIENT
Start: 2022-04-13 | End: 2022-04-13

## 2022-04-12 RX ORDER — IPRATROPIUM BROMIDE AND ALBUTEROL SULFATE 2.5; .5 MG/3ML; MG/3ML
1 SOLUTION RESPIRATORY (INHALATION) ONCE
Status: COMPLETED | OUTPATIENT
Start: 2022-04-12 | End: 2022-04-12

## 2022-04-12 RX ORDER — TRAZODONE HYDROCHLORIDE 50 MG/1
100 TABLET ORAL NIGHTLY PRN
Status: DISCONTINUED | OUTPATIENT
Start: 2022-04-12 | End: 2022-04-14 | Stop reason: HOSPADM

## 2022-04-12 RX ORDER — ACETAMINOPHEN 325 MG/1
650 TABLET ORAL EVERY 6 HOURS PRN
Status: DISCONTINUED | OUTPATIENT
Start: 2022-04-12 | End: 2022-04-14 | Stop reason: HOSPADM

## 2022-04-12 RX ADMIN — IPRATROPIUM BROMIDE AND ALBUTEROL SULFATE 1 AMPULE: .5; 3 SOLUTION RESPIRATORY (INHALATION) at 19:20

## 2022-04-12 RX ADMIN — SODIUM CHLORIDE 1905 ML: 9 INJECTION, SOLUTION INTRAVENOUS at 19:47

## 2022-04-12 RX ADMIN — METHYLPREDNISOLONE SODIUM SUCCINATE 60 MG: 125 INJECTION, POWDER, FOR SOLUTION INTRAMUSCULAR; INTRAVENOUS at 19:19

## 2022-04-12 RX ADMIN — AZITHROMYCIN MONOHYDRATE 500 MG: 500 INJECTION, POWDER, LYOPHILIZED, FOR SOLUTION INTRAVENOUS at 20:58

## 2022-04-12 RX ADMIN — ACETAMINOPHEN 1000 MG: 500 TABLET ORAL at 19:20

## 2022-04-12 RX ADMIN — CEFTRIAXONE SODIUM 1000 MG: 1 INJECTION, POWDER, FOR SOLUTION INTRAMUSCULAR; INTRAVENOUS at 19:51

## 2022-04-12 ASSESSMENT — ENCOUNTER SYMPTOMS
EYES NEGATIVE: 1
ABDOMINAL PAIN: 0
BACK PAIN: 0
COLOR CHANGE: 0
COUGH: 1
VOMITING: 0
SHORTNESS OF BREATH: 1
NAUSEA: 0

## 2022-04-12 ASSESSMENT — PAIN SCALES - GENERAL: PAINLEVEL_OUTOF10: 0

## 2022-04-12 NOTE — ED NOTES
Pt oob after spilling coffee, pt removed O2 and sats decreased to 55%. After placing pt back on 5L O2 saturations only increased to 77%. Pt returned to NRB @ 15 L O2 sat 94%.       Eloy Edwards RN  04/12/22 1910

## 2022-04-12 NOTE — PROGRESS NOTES
Pt came in via walk in with service dog, pt was in the 40's on RA, did not appear to be in respiratory distress, placed on nonrebreather and quickly came to 100%

## 2022-04-12 NOTE — ED NOTES
Pt 100% on NRB. Pt placed on 5L at this time. Pt sitting up on cot, respirations fast but regular. Pt denies CP at this time.        Deidra Cadet RN  04/12/22 0477

## 2022-04-13 ENCOUNTER — APPOINTMENT (OUTPATIENT)
Dept: CT IMAGING | Age: 47
DRG: 871 | End: 2022-04-13
Payer: MEDICARE

## 2022-04-13 LAB
ANION GAP SERPL CALCULATED.3IONS-SCNC: 7 MMOL/L (ref 3–16)
BASOPHILS ABSOLUTE: 0 K/UL (ref 0–0.2)
BASOPHILS RELATIVE PERCENT: 0.1 %
BUN BLDV-MCNC: 18 MG/DL (ref 7–20)
CALCIUM SERPL-MCNC: 8 MG/DL (ref 8.3–10.6)
CHLORIDE BLD-SCNC: 92 MMOL/L (ref 99–110)
CO2: 31 MMOL/L (ref 21–32)
CREAT SERPL-MCNC: 0.9 MG/DL (ref 0.9–1.3)
EKG ATRIAL RATE: 124 BPM
EKG DIAGNOSIS: NORMAL
EKG P AXIS: 81 DEGREES
EKG P-R INTERVAL: 128 MS
EKG Q-T INTERVAL: 302 MS
EKG QRS DURATION: 78 MS
EKG QTC CALCULATION (BAZETT): 433 MS
EKG R AXIS: 101 DEGREES
EKG T AXIS: 73 DEGREES
EKG VENTRICULAR RATE: 124 BPM
EOSINOPHILS ABSOLUTE: 0 K/UL (ref 0–0.6)
EOSINOPHILS RELATIVE PERCENT: 0 %
GFR AFRICAN AMERICAN: >60
GFR NON-AFRICAN AMERICAN: >60
GLUCOSE BLD-MCNC: 169 MG/DL (ref 70–99)
HCT VFR BLD CALC: 39.2 % (ref 40.5–52.5)
HEMATOLOGY PATH CONSULT: NORMAL
HEMOGLOBIN: 12.9 G/DL (ref 13.5–17.5)
LACTIC ACID, SEPSIS: 1.8 MMOL/L (ref 0.4–1.9)
LACTIC ACID, SEPSIS: 2.5 MMOL/L (ref 0.4–1.9)
LYMPHOCYTES ABSOLUTE: 0.4 K/UL (ref 1–5.1)
LYMPHOCYTES RELATIVE PERCENT: 3.6 %
MCH RBC QN AUTO: 31.6 PG (ref 26–34)
MCHC RBC AUTO-ENTMCNC: 33 G/DL (ref 31–36)
MCV RBC AUTO: 95.7 FL (ref 80–100)
MONOCYTES ABSOLUTE: 0.4 K/UL (ref 0–1.3)
MONOCYTES RELATIVE PERCENT: 3.6 %
NEUTROPHILS ABSOLUTE: 9.8 K/UL (ref 1.7–7.7)
NEUTROPHILS RELATIVE PERCENT: 92.7 %
PDW BLD-RTO: 13.7 % (ref 12.4–15.4)
PLATELET # BLD: 201 K/UL (ref 135–450)
PMV BLD AUTO: 8 FL (ref 5–10.5)
POTASSIUM REFLEX MAGNESIUM: 5 MMOL/L (ref 3.5–5.1)
RBC # BLD: 4.1 M/UL (ref 4.2–5.9)
SODIUM BLD-SCNC: 130 MMOL/L (ref 136–145)
WBC # BLD: 10.6 K/UL (ref 4–11)

## 2022-04-13 PROCEDURE — 6370000000 HC RX 637 (ALT 250 FOR IP): Performed by: NURSE PRACTITIONER

## 2022-04-13 PROCEDURE — 6360000002 HC RX W HCPCS: Performed by: NURSE PRACTITIONER

## 2022-04-13 PROCEDURE — 93010 ELECTROCARDIOGRAM REPORT: CPT | Performed by: INTERNAL MEDICINE

## 2022-04-13 PROCEDURE — 2700000000 HC OXYGEN THERAPY PER DAY

## 2022-04-13 PROCEDURE — 71260 CT THORAX DX C+: CPT

## 2022-04-13 PROCEDURE — 6370000000 HC RX 637 (ALT 250 FOR IP): Performed by: INTERNAL MEDICINE

## 2022-04-13 PROCEDURE — 94640 AIRWAY INHALATION TREATMENT: CPT

## 2022-04-13 PROCEDURE — 83605 ASSAY OF LACTIC ACID: CPT

## 2022-04-13 PROCEDURE — 6360000004 HC RX CONTRAST MEDICATION: Performed by: NURSE PRACTITIONER

## 2022-04-13 PROCEDURE — 94761 N-INVAS EAR/PLS OXIMETRY MLT: CPT

## 2022-04-13 PROCEDURE — 36415 COLL VENOUS BLD VENIPUNCTURE: CPT

## 2022-04-13 PROCEDURE — 80048 BASIC METABOLIC PNL TOTAL CA: CPT

## 2022-04-13 PROCEDURE — 2580000003 HC RX 258: Performed by: NURSE PRACTITIONER

## 2022-04-13 PROCEDURE — 1200000000 HC SEMI PRIVATE

## 2022-04-13 PROCEDURE — 85025 COMPLETE CBC W/AUTO DIFF WBC: CPT

## 2022-04-13 RX ORDER — SODIUM CHLORIDE 9 MG/ML
INJECTION, SOLUTION INTRAVENOUS CONTINUOUS
Status: ACTIVE | OUTPATIENT
Start: 2022-04-13 | End: 2022-04-13

## 2022-04-13 RX ORDER — PREDNISONE 20 MG/1
40 TABLET ORAL DAILY
Status: DISCONTINUED | OUTPATIENT
Start: 2022-04-14 | End: 2022-04-14 | Stop reason: HOSPADM

## 2022-04-13 RX ORDER — IPRATROPIUM BROMIDE AND ALBUTEROL SULFATE 2.5; .5 MG/3ML; MG/3ML
1 SOLUTION RESPIRATORY (INHALATION)
Status: DISCONTINUED | OUTPATIENT
Start: 2022-04-13 | End: 2022-04-14 | Stop reason: HOSPADM

## 2022-04-13 RX ADMIN — SODIUM CHLORIDE: 9 INJECTION, SOLUTION INTRAVENOUS at 01:54

## 2022-04-13 RX ADMIN — IPRATROPIUM BROMIDE AND ALBUTEROL SULFATE 3 ML: .5; 2.5 SOLUTION RESPIRATORY (INHALATION) at 00:08

## 2022-04-13 RX ADMIN — ENOXAPARIN SODIUM 40 MG: 40 INJECTION SUBCUTANEOUS at 08:25

## 2022-04-13 RX ADMIN — IPRATROPIUM BROMIDE AND ALBUTEROL SULFATE 3 ML: .5; 3 SOLUTION RESPIRATORY (INHALATION) at 15:41

## 2022-04-13 RX ADMIN — FLUOXETINE 40 MG: 20 CAPSULE ORAL at 08:25

## 2022-04-13 RX ADMIN — AZITHROMYCIN MONOHYDRATE 500 MG: 250 TABLET ORAL at 20:01

## 2022-04-13 RX ADMIN — TOPIRAMATE 50 MG: 25 TABLET, FILM COATED ORAL at 08:24

## 2022-04-13 RX ADMIN — Medication 2 PUFF: at 20:18

## 2022-04-13 RX ADMIN — IOPAMIDOL 75 ML: 755 INJECTION, SOLUTION INTRAVENOUS at 01:22

## 2022-04-13 RX ADMIN — DONEPEZIL HYDROCHLORIDE 10 MG: 5 TABLET, FILM COATED ORAL at 20:01

## 2022-04-13 RX ADMIN — IPRATROPIUM BROMIDE AND ALBUTEROL SULFATE 3 ML: .5; 3 SOLUTION RESPIRATORY (INHALATION) at 20:18

## 2022-04-13 RX ADMIN — SODIUM CHLORIDE: 9 INJECTION, SOLUTION INTRAVENOUS at 18:49

## 2022-04-13 RX ADMIN — FLUTICASONE PROPIONATE 1 SPRAY: 50 SPRAY, METERED NASAL at 08:50

## 2022-04-13 RX ADMIN — ASPIRIN 325 MG: 325 TABLET ORAL at 08:24

## 2022-04-13 RX ADMIN — IPRATROPIUM BROMIDE AND ALBUTEROL SULFATE 3 ML: .5; 3 SOLUTION RESPIRATORY (INHALATION) at 12:14

## 2022-04-13 RX ADMIN — METHYLPREDNISOLONE SODIUM SUCCINATE 40 MG: 40 INJECTION, POWDER, FOR SOLUTION INTRAMUSCULAR; INTRAVENOUS at 08:50

## 2022-04-13 RX ADMIN — RISPERIDONE 3 MG: 1 TABLET, FILM COATED ORAL at 20:01

## 2022-04-13 RX ADMIN — CEFTRIAXONE SODIUM 1000 MG: 1 INJECTION, POWDER, FOR SOLUTION INTRAMUSCULAR; INTRAVENOUS at 18:49

## 2022-04-13 ASSESSMENT — PAIN SCALES - GENERAL
PAINLEVEL_OUTOF10: 0
PAINLEVEL_OUTOF10: 0

## 2022-04-13 NOTE — ED PROVIDER NOTES
[facility]  ED  EMERGENCY DEPARTMENT ENCOUNTER        Pt Name: Shannan Meza  MRN: [id number]  [place] 1975  Date of evaluation: 4/12/2022  Provider: Rey Beltran PA-C  PCP: Clara Schroeder DO  ED Attending: Muriel Stock MD      This patient was seen by the attending provider Muriel Stock MD    History provided by the patient    CHIEF COMPLAINT:     Chief Complaint   Patient presents with    Fatigue     pt states he has felt fatigued last few days, poor apetite, cough, denies fevers. O2 was 40's on RA during triage, placed on nonrebreather    Shortness of Breath       HISTORY OF PRESENT ILLNESS:      Shannan Meza is a 48 y.o. male who arrives to the ED by private vehicle. Patient states he has been sick for approximately 3 days. He reports congestion, coughing and fevers. Patient thought he \"just had a sinus infection\". However, when symptoms persisted he arrives to the ED. On arrival, patient was found to be satting in the 45s on room air. He was placed on nonrebreather and then has been able to be weaned down to 4 L nasal cannula oxygen. He is a smoker, smoking 1 pack of cigarettes per day. Past medical history includes but is not limited to COPD, anxiety, chronic back pain, CVA. Patient cannot identify exacerbating or alleviating factors to symptoms. On arrival, he is accompanied by his service dog, Esa. Nursing Notes were reviewed     REVIEW OF SYSTEMS:     Review of Systems   Constitutional: Positive for fatigue and fever. Negative for appetite change. HENT: Positive for congestion. Eyes: Negative. Respiratory: Positive for cough and shortness of breath. Cardiovascular: Negative for chest pain. Gastrointestinal: Negative for abdominal pain, nausea and vomiting. Genitourinary: Negative. Musculoskeletal: Negative for back pain and neck pain. Skin: Negative for color change. Neurological: Negative for dizziness and headaches.    All other systems reviewed and are negative. Except as noted above in the ROS, all other systems were reviewed and negative. PAST MEDICAL HISTORY:     Past Medical History:   Diagnosis Date    Arthritis     Back disorder     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    Back pain     missing disc    COPD (chronic obstructive pulmonary disease) (HCC)     Hypoglycemia, unspecified     Knee instability     left knee unstable    Memory loss     Panic attacks     Psychiatric problem     Unspecified cerebral artery occlusion with cerebral infarction 2014    no weakness or speech problems; pt has dementia         SURGICAL HISTORY:      Past Surgical History:   Procedure Laterality Date    HUMERUS FRACTURE SURGERY Right 4/1/2019    OPEN 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       CHECKPOINT; BIOMET performed by Cody Torres MD at [address]:       Previous Medications    ALBUTEROL SULFATE  (90 BASE) MCG/ACT INHALER    INHALE 2 PUFFS EVERY 6 HOURS AS NEEDED WHEEZING    ASPIRIN 325 MG TABLET    Take 325 mg by mouth daily    BUDESONIDE-FORMOTEROL (SYMBICORT) 160-4.5 MCG/ACT AERO    INHALE 2 PUFFS TWICE A DAY    BUPRENORPHINE (SUBUTEX) 2 MG SUBL SL TABLET    Place 16 mg under the tongue daily.      DONEPEZIL (ARICEPT) 5 MG TABLET    Take 1 tablet by mouth nightly    FLUOXETINE (PROZAC) 40 MG CAPSULE    Take 1 capsule by mouth daily    FLUTICASONE (FLONASE) 50 MCG/ACT NASAL SPRAY    1 spray by Each Nostril route daily    GABAPENTIN (NEURONTIN) 600 MG TABLET    TAKE 2 TABLETS BY MOUTH THREE TIMES A DAY AS NEEDED FOR BACK PAIN    IBUPROFEN (ADVIL;MOTRIN) 800 MG TABLET    TAKE 1 TABLET BY MOUTH EVERY 8 HOURS AS NEEDED PAIN WITH FOOD    IPRATROPIUM-ALBUTEROL (DUONEB) 0.5-2.5 (3) MG/3ML SOLN NEBULIZER SOLUTION    Take 3 mLs by nebulization every 4 hours    NEBULIZER MISC    by Does not apply route daily    RESPIRATORY THERAPY SUPPLIES (NEBULIZER/TUBING/MOUTHPIECE) KIT    1 kit by Does not apply route daily as needed (Patient is needing new equipment)    RISPERIDONE (RISPERDAL) 3 MG TABLET        TIOTROPIUM (SPIRIVA HANDIHALER) 18 MCG INHALATION CAPSULE    Inhale 1 capsule into the lungs daily    TOPIRAMATE (TOPAMAX) 50 MG TABLET    Take 50 mg by mouth daily     TRAZODONE (DESYREL) 100 MG TABLET    Take 0.5-1 tablets by mouth nightly as needed for Sleep         ALLERGIES:    Mustard seed    FAMILY HISTORY:       Family History   Problem Relation Age of Onset    Diabetes Mother     Cancer Mother     Cancer Father           SOCIAL HISTORY:       Social History     Socioeconomic History    Marital status:      Spouse name: Vandana Sorto Number of children: 0    Years of education: 15    Highest education level: None   Occupational History    None   Tobacco Use    Smoking status: Current Every Day Smoker     Packs/day: 1.00     Years: 35.00     Pack years: 35.00     Types: Cigarettes    Smokeless tobacco: Never Used    Tobacco comment: 5/25/21 - smokes 1 ppd/dlk   Vaping Use    Vaping Use: Some days    Substances: Nicotine    Devices: Pre-filled or refillable cartridge   Substance and Sexual Activity    Alcohol use: Yes     Comment: occ    Drug use: Not Currently     Comment: Quit using heroin 4 months ago    Sexual activity: Yes     Partners: Female   Other Topics Concern    None   Social History Narrative    None     Social Determinants of Health     Financial Resource Strain: Low Risk     Difficulty of Paying Living Expenses: Not hard at all   Food Insecurity: No Food Insecurity    Worried About Running Out of Food in the Last Year: Never true    Simba of Food in the Last Year: Never true   Transportation Needs: No Transportation Needs    Lack of Transportation (Medical): No    Lack of Transportation (Non-Medical):  No   Physical Activity:     Days of Exercise per Week: Not on file    Minutes of Exercise per Session: Not on file   Stress:     Feeling of Stress : Not on file   Social Connections:     Frequency of Communication with Friends and Family: Not on file    Frequency of Social Gatherings with Friends and Family: Not on file    Attends Hoahaoism Services: Not on file    Active Member of Clubs or Organizations: Not on file    Attends Club or Organization Meetings: Not on file    Marital Status: Not on file   Intimate Partner Violence:     Fear of Current or Ex-Partner: Not on file    Emotionally Abused: Not on file    Physically Abused: Not on file    Sexually Abused: Not on file   Housing Stability:     Unable to Pay for Housing in the Last Year: Not on file    Number of [place] in the Last Year: Not on file    Unstable Housing in the Last Year: Not on file       SCREENINGS:    Mike Coma Scale  Eye Opening: Spontaneous  Best Verbal Response: Oriented  Best Motor Response: Obeys commands  Mike Coma Scale Score: 15        PHYSICAL EXAM:       ED Triage Vitals   BP Temp Temp Source Pulse Resp SpO2 Height Weight   04/12/22 1823 04/12/22 1823 04/12/22 1823 04/12/22 1823 04/12/22 1823 04/12/22 1815 04/12/22 1823 04/12/22 1823   (!) 136/94 99.9 °F (37.7 °C) Oral 127 24 (!) 45 % 5' 9\" (1.753 m) 140 lb (63.5 kg)       Physical Exam    CONSTITUTIONAL: Awake and alert. Cooperative. Well-developed. Well-nourished. Non-toxic. No acute distress. HENT: Normocephalic. Atraumatic. External ears normal, without discharge. No nasal discharge. Oropharynx clear. Mucous membranes moist.  EYES: Conjunctiva non-injected. No scleral icterus. PERRL. EOM's grossly intact. NECK: Supple. Normal ROM. CARDIOVASCULAR: RRR. No Murmer. Intact distal pulses. PULMONARY/CHEST WALL: Effort normal. No tachypnea. Lungs with diffuse wheezing and rhonchi to ausculation. ABDOMEN: Normal BS. Soft. Nondistended. No tenderness to palpate. No guarding. /ANORECTAL: Not assessed  MUSKULOSKELETAL: Normal ROM. No acute deformities. No edema.  No tenderness to Occasional (A)     Polychromasia Occasional (A)     Poikilocytes Occasional (A)     Ovalocytes Occasional (A)    Troponin   Result Value Ref Range    Troponin <0.01 <0.01 ng/mL   Sample possible blood bank testing   Result Value Ref Range    Specimen Status ADY    Lactate, Sepsis   Result Value Ref Range    Lactic Acid, Sepsis 1.2 0.4 - 1.9 mmol/L   EKG 12 Lead   Result Value Ref Range    Ventricular Rate 124 BPM    Atrial Rate 124 BPM    P-R Interval 128 ms    QRS Duration 78 ms    Q-T Interval 302 ms    QTc Calculation (Bazett) 433 ms    P Axis 81 degrees    R Axis 101 degrees    T Axis 73 degrees    Diagnosis       ** Poor data quality, interpretation may be adversely affectedSinus tachycardiaRightward axisPulmonary disease patternAbnormal ECGWhen compared with ECG of 16-JAN-2019 10:42,Vent. rate has increased BY  47 BPM         RADIOLOGY:  All x-ray studies areviewed/reviewed by me. Formal interpretations per the radiologist are as follows:      XR CHEST PORTABLE    Result Date: 4/12/2022  EXAMINATION: ONE XRAY VIEW OF THE CHEST 4/12/2022 3:36 pm COMPARISON: 01/17/2019 HISTORY: ORDERING SYSTEM PROVIDED HISTORY: sob TECHNOLOGIST PROVIDED HISTORY: Reason for exam:->sob Reason for Exam: sob FINDINGS: There is new airspace disease seen within the right lower lung. That is found on a background of chronic interstitial opacity seen bilaterally. Emphysematous changes in the upper lungs again noted. New airspace disease the right lung base, concerning for pneumonia or aspiration. EKG: The Ekg interpreted by me in the absence of a cardiologist shows. sinus tachycardia, [id number]     See also interpretation by Melissa Chauhan MD.      PROCEDURES:   N/A    CRITICAL CARE TIME:       Due to the immediate potential for life-threatening deterioration due to acute respiratory failure with hypoxia as well as sepsis from pneumonia, I spent 39 minutes providing critical care.   This time is excluding time spent performing procedures. CONSULTS:  IP CONSULT TO HOSPITALIST      EMERGENCY DEPARTMENT COURSE and DIFFERENTIAL DIAGNOSIS/MDM:   Vitals:    Vitals:    04/12/22 1823 04/12/22 1839 04/12/22 1928 04/12/22 1939   BP: (!) 136/94 128/86  108/77   Pulse: 127 127  107   Resp: 24 23 21   Temp: 99.9 °F (37.7 °C)  100.5 °F (38.1 °C)    TempSrc: Oral  Oral    SpO2: 100% 99%  92%   Weight: 140 lb (63.5 kg)      Height: 5' 9\" (1.753 m)          Patient was given the following medications:  Medications   0.9 % sodium chloride bolus (1,905 mLs IntraVENous New Bag 4/12/22 1947)   azithromycin (ZITHROMAX) 500 mg in D5W 250ml addavial (has no administration in time range)   acetaminophen (TYLENOL) tablet 1,000 mg (1,000 mg Oral Given 4/12/22 1920)   methylPREDNISolone sodium (SOLU-MEDROL) injection 60 mg (60 mg IntraVENous Given 4/12/22 1919)   ipratropium-albuterol (DUONEB) nebulizer solution 1 ampule (1 ampule Inhalation Given 4/12/22 1920)   cefTRIAXone (ROCEPHIN) 1000 mg IVPB in 50 mL D5W minibag (1,000 mg IntraVENous New Bag 4/12/22 1951)         Patient was evaluated by both myself and Dagoberto Edwards MD.   Old records were reviewed. Patient arrived to the ED reporting 3 days of illness. He reported feeling like he might have a sinus infection. However along with congestion has been coughing and short of breath. He arrived to the ED satting in the 40s and was placed on nonrebreather. Ultimately, he has been weaned down to 4 L nasal cannula oxygen and is doing better. He is tachycardic and has a low-grade fever on arrival as well. Sepsis orders initiated with Tylenol 1 g orally, normal saline 30 mils per kilogram IV. He is ordered Solu-Medrol and a DuoNeb treatment. EKG shows sinus tachycardia with rate in the 120s. Rapid Covid and rapid flu swabs negative  CBC white count 14.9 with 10% bands. H&H 15.3 and 46.4  Lactate 1.2  BMP glucose 188.   BUN is 18 creatinine 1.3  Troponin negative  proBNP 646  Portable chest x-ray shows no airspace disease in the right lung base, concerning for pneumonia or aspiration. Blood cultures x2 drawn. It is ordered Rocephin and Zithromax IV for community-acquired pneumonia. The patient is doing better on 4 L nasal cannula oxygen. Sats are in the 90s. He is breathing easier. He certainly warrants hospitalization at this time for acute respiratory failure as well as sepsis from pneumonia. I spoke with Dr. Mayela Franklin. We thoroughly discussed the history, physical exam, laboratory and imaging studies, as well as, emergency department course. Based upon that discussion, we've decided to admit Orquidea Amor for further observation and evaluation of Evaristo Ziegler's acute respiratory failure with hypoxia, sepsis, pneumonia. As I have deemed necessary from their history, physical, and studies, I have considered and evaluated Orquidea Amor for the following diagnoses:  ACUTE CORONARY SYNDROME, PERICARDIAL TAMPONADE, CHF, SEPSIS, COPD EXACERBATION, PNEUMONIA, PNEUMOTHORAX, PULMONARY EMBOLISM, and THORACIC DISSECTION. FINAL IMPRESSION:      1. Acute respiratory failure with hypoxia ([address]    2. Septicemia ([address]    3. Pneumonia of right lower lobe due to infectious organism    4. Bandemia    5.  COPD exacerbation ([address]          DISPOSITION/PLAN:   DISPOSITION Decision To Admit               (Please note thatportions of this note were completed with a voice recognition program.  Efforts were made to edit the dictations, but occasionally words are mis-transcribed.)    Anushka Francois PA-C (electronicallysigned)              MIGUEL Jasmine  04/12/22 2046

## 2022-04-13 NOTE — PROGRESS NOTES
4 Eyes Skin Assessment     The patient is being assess for  Admission    I agree that 2 RN's have performed a thorough Head to Toe Skin Assessment on the patient. ALL assessment sites listed below have been assessed. Areas assessed by both nurses: Dayana/Hannah  [x]   Head, Face, and Ears   [x]   Shoulders, Back, and Chest  [x]   Arms, Elbows, and Hands   [x]   Coccyx, Sacrum, and Ischum  [x]   Legs, Feet, and Heels        Does the Patient have Skin Breakdown?   No         Srinivas Prevention initiated:  No   Wound Care Orders initiated:  No      [facility] nurse consulted for Pressure Injury (Stage 3,4, Unstageable, DTI, NWPT, and Complex wounds):  No      Nurse 1 eSignature: Electronically signed by Hal Aviles RN on 4/13/22 at 12:58 AM EDT    **SHARE this note so that the co-signing nurse is able to place an eSignature**    Nurse 2 eSignature: {Esignature:[id number]}

## 2022-04-13 NOTE — ED NOTES
Unable to call report at this time due to pt emotional service dog needs to be retrieved from bedside by anum. Will call with report when dog is on the way home.        Marvin Jones, RN  04/12/22 2100

## 2022-04-13 NOTE — CARE COORDINATION
CASE MANAGEMENT INITIAL ASSESSMENT      Reviewed chart and completed assessment with patient:at bedside  Family present: no  Explained Case Management role/services. yes    Primary contact information:see below    Health Care Decision Maker :   Primary Decision Maker: NANCY GAMEZ - Brother/Sister - [phone]          Can this person be reached and be able to respond quickly, such as within a few minutes or hours? Yes    Admit date/status:04/12/2022  Diagnosis:acute respiratory failure w/hypoxia   Is this a Readmission?:  No      Insurance:Medicare   Precert required for SNF: No       3 night stay required: waived    Living arrangements, Adls, care needs, prior to admission:Pt lives in a camper; god daughter stays part time (7yrs old). IPTA, drives, able to care for ADL's    Durable Medical Equipment at home:  none  Services in the home and/or outpatient, prior to admission:none    Current PCP:Eric [address], DO    Transportation needs: none, pt drove     PT/OT recs:n/a    Hospital Exemption Notification (HEN):n/a    Barriers to discharge:none    Plan/comments:Pt plans to d/c home when he is stable. Per pt, he was diagnosed w/dementia when he was 16 as part of a \"rapid aging disease\" he cannot remember the name of. Pt states he has not had any memory issues in several years. Pt is a/o able to make his own decisions. Pt states his god daughter is with family and only stays with him part time. Pt is currently on O2, wears 0 at home. Per pt, he lives full time at his camper and it has electricity should he need home O2. CM will continue to follow for possible home O2 needs.  Adolfo Thomas RN      ECOC on chart for MD signature

## 2022-04-13 NOTE — H&P
Hospital Medicine History & Physical      PCP: Mohit Whalen DO    Date of Admission: 4/12/2022    Date of Service: Pt seen/examined on 4/12/2022 and Admitted to Inpatient with expected LOS greater than two midnights due to medical therapy. Chief Complaint:  Shortness of breath    History Of Present Illness:      48 y.o. male, with PMH COPD, tobacco use and dementia, who presented to [facility] with shortness of breath. History obtained from the patient and review of EMR. The patient stated over the last 3 -4 days he has been experiencing shortness of breath and congestion. He stated he initially thought he had a sinus infection, but his symptoms have been getting progressively worse. The patient stated over the last 2 days he has felt fatigued, had a poor appetite and coughing up yellow thick phlegm. Per EMR, the patient drove himself to the ED and in triage his SPO2 was 45% on RA. He was initially placed on a NRB mask, but has since been weaned to 5 LNC. The patient stated he has COPD and follows with pulmonology (cant remember his name), but does not use any supplemental oxygen at home. He stated he does do inhalers and nebulizers though. The patient also still continues to smoke 1 PPD. In the ED a CXR was obtained that revealed new airspace disease right lung base, concerning for pneumonia or aspiration. His rapid COVID and influenza A and B were all negative. The patient does meet sepsis protocol with temp of 100.5,  and WBC 14. 9. his initial lactic was negative at 1.2. the patient was started on sepsis protocol and received 30 ml/kg IVF bolus. He was started on ceftriaxone and azithromycin and given solumedrol and a duoneb. The patient will be admitted for further evaluation and treatment. He denied any other associated symptoms as well as any aggravating and/or alleviating factors. At the time of this assessment, the patient was resting comfortably in bed.  He currently denies any chest pain, back pain, abdominal pain, shortness of breath, numbness, tingling, N/V/C/D, fever and/or chills. 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    Back pain     missing disc    COPD (chronic obstructive pulmonary disease) (HCC)     Hypoglycemia, unspecified     Knee instability     left knee unstable    Memory loss     Panic attacks     Psychiatric problem     Unspecified cerebral artery occlusion with cerebral infarction 2014    no weakness or speech problems; pt has dementia     Past Surgical History:          Procedure Laterality Date    HUMERUS FRACTURE SURGERY Right 4/1/2019    OPEN 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       CHECKPOINT; BIOMET performed by Becki Badillo MD at [place] [address]     Medications Prior to Admission:      Prior to Admission medications    Medication Sig Start Date End Date Taking?  Authorizing Provider   gabapentin (NEURONTIN) 600 MG tablet TAKE 2 TABLETS BY MOUTH THREE TIMES A DAY AS NEEDED FOR BACK PAIN 1/3/22 4/3/22  Eric Alonso DO   budesonide-formoterol (SYMBICORT) 160-4.5 MCG/ACT AERO INHALE 2 PUFFS TWICE A DAY 12/27/21   Luna Smith MD   albuterol sulfate  (90 Base) MCG/ACT inhaler INHALE 2 PUFFS EVERY 6 HOURS AS NEEDED WHEEZING 9/13/21   Eric Alonso DO   tiotropium (SPIRIVA HANDIHALER) 18 MCG inhalation capsule Inhale 1 capsule into the lungs daily 6/3/21   Luna Smith MD   ibuprofen (ADVIL;MOTRIN) 800 MG tablet TAKE 1 TABLET BY MOUTH EVERY 8 HOURS AS NEEDED PAIN WITH FOOD 12/26/19   Yolis Mendez, APRN - CNP   Respiratory Therapy Supplies (NEBULIZER/TUBING/MOUTHPIECE) KIT 1 kit by Does not apply route daily as needed (Patient is needing new equipment) 12/20/19   Jas Sarah MD   risperiDONE (RISPERDAL) 3 MG tablet  11/26/19   Historical Provider, MD   ipratropium-albuterol (DUONEB) 0.5-2.5 (3) MG/3ML SOLN nebulizer solution Take 3 mLs by nebulization every 4 hours 12/13/19   Leonarda Carmona MD   aspirin 325 MG tablet Take 325 mg by mouth daily    Historical Provider, MD   fluticasone (FLONASE) 50 MCG/ACT nasal spray 1 spray by Each Nostril route daily 7/23/19   Leonarda Carmona MD   traZODone (DESYREL) 100 MG tablet Take 0.5-1 tablets by mouth nightly as needed for Sleep 5/16/19   Eric Alonso DO   FLUoxetine (PROZAC) 40 MG capsule Take 1 capsule by mouth daily 12/27/18   Sogregory Alonso DO   buprenorphine (SUBUTEX) 2 MG SUBL SL tablet Place 16 mg under the tongue daily. Historical Provider, MD   Nebulizer MISC by Does not apply route daily    Historical Provider, MD   topiramate (TOPAMAX) 50 MG tablet Take 50 mg by mouth daily     Historical Provider, MD   donepezil (ARICEPT) 5 MG tablet Take 1 tablet by mouth nightly  Patient taking differently: Take 10 mg by mouth nightly  1/1/18   Zuleyma Schrader APRN - CNP     Allergies:  Mustard seed    Social History:      The patient currently lives at home    TOBACCO:   reports that he has been smoking cigarettes. He has a 35.00 pack-year smoking history. He has never used smokeless tobacco.  ETOH:   reports current alcohol use. E-Cigarettes/Vaping Use     Questions Responses    E-Cigarette/Vaping Use Current Some Day User    Start Date     Passive Exposure     Quit Date     Counseling Given     Comments Mid-Size or Vape Pen        Family History:      Reviewed in detail. Positive as follows:        Problem Relation Age of Onset    Diabetes Mother     Cancer Mother     Cancer Father      REVIEW OF SYSTEMS COMPLETED:   Pertinent positives as noted in the HPI. All other systems reviewed and negative. PHYSICAL EXAM PERFORMED:    /77   Pulse 107   Temp 100.5 °F (38.1 °C) (Oral)   Resp 21   Ht 5' 9\" (1.753 m)   Wt 140 lb (63.5 kg)   SpO2 92%   BMI 20.67 kg/m²     General appearance:  Pleasant male in no apparent distress, appears stated age and cooperative.   HEENT: Pupils equal, round, and reactive to light. Extra ocular muscles intact. Conjunctivae/corneas clear. Neck: Supple, with full range of motion. No jugular venous distention. Trachea midline. Respiratory:  Normal respiratory effort. Diminished bilaterally t/o, rhonchi bilateral bases  5 LNC  Cardiovascular:  Regular rate and rhythm with normal S1/S2 without murmurs, rubs or gallops. Abdomen: Soft, non-tender, non-distended with normal bowel sounds. Musculoskeletal:  No clubbing, cyanosis or edema bilaterally. Full range of motion without deformity. Skin: Skin color, texture, turgor normal.  No significant rashes or lesions. Neurologic:  Neurovascularly intact. Cranial nerves: II-XII intact, grossly non-focal.  Psychiatric:  Alert and oriented, thought content appropriate, normal insight  Capillary Refill: Brisk,3 seconds, normal  Peripheral Pulses: +2 palpable, equal bilaterally     Labs:     Recent Labs     04/12/22  1826   WBC 14.9*   HGB 15.3   HCT 46.4        Recent Labs     04/12/22  1826      K 4.7   CL 91*   CO2 31   BUN 18   CREATININE 1.3   CALCIUM 9.0     Recent Labs     04/12/22  1826   TROPONINI <0.01     Urinalysis:      Lab Results   Component Value Date    NITRU Negative 04/12/2018    BLOODU Negative 04/12/2018    SPECGRAV 1.015 04/12/2018    GLUCOSEU Negative 04/12/2018     Radiology:     CXR: I have reviewed the CXR with the following interpretation:  new airspace disease right lung base, concerning for pneumonia or aspiration    EKG:  I have reviewed the EKG with the following interpretation: The Ekg interpreted in the absence of a cardiologist shows Poor data quality, interpretation may be adversely affected. Sinus tachycardia. Rightward axis. Pulmonary disease pattern. Abnormal ECG. When compared with ECG of 16-JAN-2019 10:42,Vent. rate has increased BY  47 BPM    XR CHEST PORTABLE   Final Result   New airspace disease the right lung base, concerning for pneumonia or   aspiration. ASSESSMENT:    Active Hospital Problems    Diagnosis Date Noted    Sepsis ([facility] [A41.9] 04/12/2022    Pneumonia [J18.9] 04/12/2022    Chronic obstructive pulmonary disease with acute exacerbation ([facility] [J44.1] 12/13/2019    Current smoker [F17.200] 07/23/2019    Acute respiratory failure with hypoxia ([facility] [J96.01] 01/17/2019     PLAN:    Acute respiratory failure with hypoxia in setting of pneumonia. spo2 45% on arrival to ED, now requiring 5 L NC supplemental oxygen  -CXR revealed: new airspace disease right lung base, concerning for pneumonia or aspiration  -azithromycin and ceftriaxone given in ED and continued 4/12/2022  -COVID negative  -Influenza A and B negative  -D dimer pending  -duoneb given in ED and continued PRN  -solumedrol given in ED and continued 4/12/2022  -PRN inhalers  -encourage coughing and deep breathing  -IS  -continuous pulse ox  -bedside swallow given possible aspiration  -NPO for now, if pass swallow advance diet as tolerated    Sepsis 2/2 pneumonia. Temp 100.5, , WBC 14.9, lactic 1.2 on admission   -30 ml/kg IVF bolus in ED  -UA negative for infection  -tylenol given in ED  -blood cultures ordered in ED  -Abx as indicated above  -repeat lactic    COPD, stable   -does not appear to be in acute exacerbation at this time  -oxygen therapy protocol  -continue inhalers and nebulizers    Tobacco use  -tobacco cessation   -nicotine patch    Dementia  -supportive care  -continue aricept    DVT Prophylaxis: Lovenox    Diet: No diet orders on file     Code Status: Prior    PT/OT Eval Status: no indication for need at this time    Dispo - 3-4 days pending clinical improvement     Mk Wagner, APRN - CNP    Thank you Mesfin Walker DO for the opportunity to be involved in this patient's care.  If you have any questions or concerns please feel free to contact me at [phone].  ------------------------------Anticipated Dr. Noemi flores--------------------------

## 2022-04-13 NOTE — PLAN OF CARE
Problem: Falls - Risk of:  Goal: Will remain free from falls  Description: Will remain free from falls  4/13/2022 0942 by Kait Knox RN  Outcome: Ongoing  Note: Pt will remain free from falls throughout hospital stay. Fall precautions in place, bed alarm on, bed in lowest position with wheels locked and side rails 2/4 up. Room door open and hourly rounding completed. Will continue to monitor throughout shift. Problem: Pain:  Goal: Patient's pain/discomfort is manageable  Description: Patient's pain/discomfort is manageable  Outcome: Ongoing  Note: Pt will be satisfied with pain control. Pt uses numeric pain rating scale with reassessments after pain med administration. Will continue to monitor progression throughout shift.

## 2022-04-13 NOTE — PLAN OF CARE
Problem: Falls - Risk of:  Goal: Will remain free from falls  Description: Will remain free from falls  Outcome: Ongoing   Pt will remain free from falls throughout hospital stay. Fall precautions in place, bed alarm on, bed in lowest position with wheels locked and side rails 2/4 up. Room door open and hourly rounding completed. Will continue to monitor throughout shift. Call light and bedside table within pt's reach.

## 2022-04-13 NOTE — PROGRESS NOTES
Reviewed    Infusion Medications    sodium chloride 100 mL/hr at 04/13/22 0154    sodium chloride      sodium chloride       Scheduled Medications    ipratropium-albuterol  1 vial Nebulization Q4H WA    aspirin  325 mg Oral Daily    budesonide-formoterol  2 puff Inhalation BID    donepezil  10 mg Oral Nightly    FLUoxetine  40 mg Oral Daily    fluticasone  1 spray Each Nostril Daily    risperiDONE  3 mg Oral Nightly    topiramate  50 mg Oral Daily    sodium chloride flush  5-40 mL IntraVENous 2 times per day    enoxaparin  40 mg SubCUTAneous Daily    sodium chloride flush  5-40 mL IntraVENous 2 times per day    cefTRIAXone (ROCEPHIN) IV  1,000 mg IntraVENous Q24H    And    azithromycin  500 mg Oral Q24H    methylPREDNISolone  40 mg IntraVENous Q12H     PRN Meds: albuterol sulfate HFA, traZODone, sodium chloride flush, sodium chloride, ondansetron **OR** ondansetron, polyethylene glycol, acetaminophen **OR** acetaminophen, sodium chloride flush, sodium chloride      Intake/Output Summary (Last 24 hours) at 4/13/2022 1059  Last data filed at 4/13/2022 3140  Gross per 24 hour   Intake --   Output 100 ml   Net -100 ml       Physical Exam Performed:    BP 98/63   Pulse 72   Temp 97.6 °F (36.4 °C) (Oral)   Resp 16   Ht 5' 9\" (1.753 m)   Wt 125 lb 4.8 oz (56.8 kg)   SpO2 96%   BMI 18.50 kg/m²     General appearance:  Pleasant male in no apparent distress, appears stated age and cooperative. HEENT: Pupils equal, round, and reactive to light. Extra ocular muscles intact. Conjunctivae/corneas clear. Neck: Supple, with full range of motion. No jugular venous distention. Trachea midline. Respiratory:  Normal respiratory effort. Diminished bilaterally t/o, rhonchi bilateral bases  5 LNC  Cardiovascular:  Regular rate and rhythm with normal S1/S2 without murmurs, rubs or gallops. Abdomen: Soft, non-tender, non-distended with normal bowel sounds.   Musculoskeletal:  No clubbing, cyanosis or edema bilaterally. Full range of motion without deformity. Skin: Skin color, texture, turgor normal.  No significant rashes or lesions. Neurologic:  Neurovascularly intact. Cranial nerves: II-XII intact, grossly non-focal.  Psychiatric:  Alert and oriented, thought content appropriate, normal insight  Capillary Refill: Brisk,3 seconds, normal  Peripheral Pulses: +2 palpable, equal bilaterally       Labs:   Recent Labs     04/12/22 1826 04/13/22  0146   WBC 14.9* 10.6   HGB 15.3 12.9*   HCT 46.4 39.2*    201     Recent Labs     04/12/22 1826 04/13/22 0146    130*   K 4.7 5.0   CL 91* 92*   CO2 31 31   BUN 18 18   CREATININE 1.3 0.9   CALCIUM 9.0 8.0*     No results for input(s): AST, ALT, BILIDIR, BILITOT, ALKPHOS in the last 72 hours. No results for input(s): INR in the last 72 hours. Recent Labs     04/12/22 1826   TROPONINI <0.01       Urinalysis:      Lab Results   Component Value Date    NITRU Negative 04/12/2018    BLOODU Negative 04/12/2018    SPECGRAV 1.015 04/12/2018    GLUCOSEU Negative 04/12/2018       Radiology:  CT CHEST PULMONARY EMBOLISM W CONTRAST   Final Result   1. No pulmonary emboli. 2. No thoracic aortic aneurysm or dissection. 3. Severe emphysematous changes, with apical predominance. Superimposed   patchy nodular areas of consolidation, as well as associated bronchial   thickening suggestive of bronchitis and bronchopneumonia. 4. No pleural effusion. 5. Coronary artery atherosclerosis. 6. Borderline reactive hilar lymphadenopathy. XR CHEST PORTABLE   Final Result   New airspace disease the right lung base, concerning for pneumonia or   aspiration.                  Assessment/Plan:    Active Hospital Problems    Diagnosis     Sepsis ([address] [A41.9]     Pneumonia [J18.9]     Chronic obstructive pulmonary disease with acute exacerbation ([address] [J44.1]     Current smoker [F17.200]     Acute respiratory failure with hypoxia ([address] [J96.01]      Sepsis 2/2 pneumonia  - presented with fever, tachycardia, leukocytosis. Lactate 2.5 on admission  - continue IVF  - suspect gram positive pneumonia  - continue ceftriaxone, azithromycin  - continue steroids  - blood cultures ordered    Acute hypoxic respiratory failure  - 2/2 above  - wean O2 as able    COPD  - no evidence of exacerbation  - continue home inhalers    Tobacco dependence  - counseling given. Nicotine patch ordered    Dementia  - at mental baseline  - supportive care    DVT Prophylaxis: lovenox  Diet: ADULT DIET;  Regular  Code Status: Full Code    PT/OT Eval Status: not ordered    Dispo - home in 1-2 days    Mario Meadows MD

## 2022-04-13 NOTE — PROGRESS NOTES
Patient admitted to room 206 from ED. Patient oriented to room, call light, bed rails, phone, lights and bathroom. Patient instructed about the schedule of the day including: vital sign frequency, lab draws, possible tests, frequency of MD and staff rounds, including RN/MD rounding together at bedside, daily weights, and I &O's. Patient instructed about prescribed diet, how to use 8MENU, and television. Bed alarm in place, patient aware of placement and reason. Telemetry box in place, patient aware of placement and reason. Bed locked, in lowest position, side rails up 2/4, call light within reach. Will continue to monitor.

## 2022-04-14 VITALS
BODY MASS INDEX: 18.56 KG/M2 | RESPIRATION RATE: 20 BRPM | OXYGEN SATURATION: 90 % | TEMPERATURE: 97.7 F | HEIGHT: 69 IN | WEIGHT: 125.3 LBS | HEART RATE: 101 BPM | DIASTOLIC BLOOD PRESSURE: 64 MMHG | SYSTOLIC BLOOD PRESSURE: 95 MMHG

## 2022-04-14 PROCEDURE — 94640 AIRWAY INHALATION TREATMENT: CPT

## 2022-04-14 PROCEDURE — 6370000000 HC RX 637 (ALT 250 FOR IP): Performed by: INTERNAL MEDICINE

## 2022-04-14 PROCEDURE — 6360000002 HC RX W HCPCS: Performed by: NURSE PRACTITIONER

## 2022-04-14 PROCEDURE — 94761 N-INVAS EAR/PLS OXIMETRY MLT: CPT

## 2022-04-14 PROCEDURE — 2700000000 HC OXYGEN THERAPY PER DAY

## 2022-04-14 PROCEDURE — 6370000000 HC RX 637 (ALT 250 FOR IP): Performed by: NURSE PRACTITIONER

## 2022-04-14 PROCEDURE — 2580000003 HC RX 258: Performed by: NURSE PRACTITIONER

## 2022-04-14 RX ORDER — AMOXICILLIN AND CLAVULANATE POTASSIUM 875; 125 MG/1; MG/1
1 TABLET, FILM COATED ORAL 2 TIMES DAILY
Qty: 10 TABLET | Refills: 0 | Status: SHIPPED | OUTPATIENT
Start: 2022-04-14 | End: 2022-04-19

## 2022-04-14 RX ORDER — ASPIRIN 81 MG/1
81 TABLET, CHEWABLE ORAL DAILY
Status: DISCONTINUED | OUTPATIENT
Start: 2022-04-14 | End: 2022-04-14 | Stop reason: HOSPADM

## 2022-04-14 RX ADMIN — PREDNISONE 40 MG: 20 TABLET ORAL at 09:01

## 2022-04-14 RX ADMIN — SODIUM CHLORIDE, PRESERVATIVE FREE 10 ML: 5 INJECTION INTRAVENOUS at 09:02

## 2022-04-14 RX ADMIN — IPRATROPIUM BROMIDE AND ALBUTEROL SULFATE 3 ML: .5; 3 SOLUTION RESPIRATORY (INHALATION) at 11:47

## 2022-04-14 RX ADMIN — ASPIRIN 81 MG 81 MG: 81 TABLET ORAL at 09:01

## 2022-04-14 RX ADMIN — IPRATROPIUM BROMIDE AND ALBUTEROL SULFATE 3 ML: .5; 3 SOLUTION RESPIRATORY (INHALATION) at 08:07

## 2022-04-14 RX ADMIN — ENOXAPARIN SODIUM 40 MG: 40 INJECTION SUBCUTANEOUS at 09:02

## 2022-04-14 RX ADMIN — TOPIRAMATE 50 MG: 25 TABLET, FILM COATED ORAL at 09:01

## 2022-04-14 RX ADMIN — FLUOXETINE 40 MG: 20 CAPSULE ORAL at 09:01

## 2022-04-14 RX ADMIN — FLUTICASONE PROPIONATE 1 SPRAY: 50 SPRAY, METERED NASAL at 09:02

## 2022-04-14 NOTE — PROGRESS NOTES
Pt assessment completed and charted. VSS. Pt a/o x4. IV abx infusing. Medications given per MAR. Pt uses urinal at bedside. Bed in lowest position and wheels locked. Call light within reach. Bedside table within reach. Non-skid footwear in place. Pt denies any other needs at this time. Pt calls out appropriately. Will continue to monitor.

## 2022-04-14 NOTE — DISCHARGE SUMMARY
Hospital Medicine Discharge Summary    Patient ID: Julián Miles      Patient's PCP: Tee Villavicencio DO    Admit Date: 4/12/2022     Discharge Date:   04/14/22    Admitting Provider: Rell Vogt MD     Discharge Provider: Rosi Eng MD     Discharge Diagnoses: Active Hospital Problems    Diagnosis     Sepsis ([facility] [A41.9]     Pneumonia [J18.9]     Chronic obstructive pulmonary disease with acute exacerbation ([facility] [J44.1]     Current smoker [F17.200]     Acute respiratory failure with hypoxia ([facility] [J96.01]        The patient was seen and examined on day of discharge and this discharge summary is in conjunction with any daily progress note from day of discharge. Hospital Course:   48 y. o. male, with PMH COPD, tobacco use and dementia, who [facility] to [facility] with shortness of breath. History obtained from the patient and review of EMR. The patient stated over the last 3 -4 days he has been experiencing shortness of breath and congestion. He stated he initially thought he had a sinus infection, but his symptoms have been getting progressively worse. The patient stated over the last 2 days he has felt fatigued, had a poor appetite and coughing up yellow thick phlegm. Per EMR, the patient drove himself to the ED and in triage his SPO2 was 45% on RA. He was initially placed on a NRB mask, but has since been weaned to 5 LNC. The patient stated he has COPD and follows with pulmonology (cant remember his name), but does not use any supplemental oxygen at home. He stated he does do inhalers and nebulizers though. The patient also still continues to smoke 1 PPD. In the ED a CXR was obtained that revealed new airspace disease right lung base, concerning for pneumonia or aspiration. His rapid COVID and influenza A and B were all negative. The patient does meet sepsis protocol with temp of 100.5,  and WBC 14. 9. his initial lactic was negative at 1.2. the patient was started on sepsis protocol and received 30 ml/kg IVF bolus. He was started on ceftriaxone and azithromycin and given solumedrol and a duoneb. The patient will be admitted for further evaluation and treatment. He denied any other associated symptoms as well as any aggravating and/or alleviating factors. At the time of this assessment, the patient was resting comfortably in bed. He currently denies any chest pain, back pain, abdominal pain, shortness of breath, numbness, tingling, N/V/C/D, fever and/or chills. Sepsis 2/2 pneumonia  - presented with fever, tachycardia, leukocytosis. Lactate 2.5 on admission  - suspect gram positive pneumonia  - started on ceftriaxone, azithromycin. Discharged on augmentin to complete course  - completed steroids  - blood cultures NGTD    COPD  - no evidence of exacerbation  - continue home inhalers     Acute hypoxic respiratory failure  - 2/2 above  - suspect underlying hypoxia related to COPD  - discharged home on 2L NC     Tobacco dependence  - counseling given. Nicotine patch ordered     Despite dementia diagnosis in the chart, this seems to be in error. He was told he had dementia years ago but only seems to have mild cognitive impairment at best. Advise follow up with PCP. Physical Exam Performed:     BP 95/64   Pulse 101   Temp 97.7 °F (36.5 °C) (Oral)   Resp 20   Ht 5' 9\" (1.753 m)   Wt 125 lb 4.8 oz (56.8 kg)   SpO2 90%   BMI 18.50 kg/m²       General appearance:  Pleasant male in no apparent distress, appears stated age and cooperative. HEENT: Pupils equal, round, and reactive to light.  Extra ocular muscles intact. Conjunctivae/corneas clear. Neck: Supple, with full range of motion. No jugular venous distention. Trachea midline. Respiratory:  Normal respiratory effort. Diminished bilaterally t/o, rhonchi bilateral bases  5 LNC  Cardiovascular:  Regular rate and rhythm with normal S1/S2 without murmurs, rubs or gallops.   Abdomen: Soft, non-tender, non-distended with normal bowel sounds. Musculoskeletal:  No clubbing, cyanosis or edema bilaterally.  Full range of motion without deformity. Skin: Skin color, texture, turgor normal.  No significant rashes or lesions. Neurologic:  Neurovascularly intact. Cranial nerves: II-XII intact, grossly non-focal.  Psychiatric:  Alert and oriented, thought content appropriate, normal insight  Capillary Refill: Brisk,3 seconds, normal  Peripheral Pulses: +2 palpable, equal bilaterally     Labs: For convenience and continuity at follow-up the following most recent labs are provided:      CBC:    Lab Results   Component Value Date    WBC 10.6 04/13/2022    HGB 12.9 04/13/2022    HCT 39.2 04/13/2022     04/13/2022       Renal:    Lab Results   Component Value Date     04/13/2022    K 5.0 04/13/2022    CL 92 04/13/2022    CO2 31 04/13/2022    BUN 18 04/13/2022    CREATININE 0.9 04/13/2022    CALCIUM 8.0 04/13/2022         Significant Diagnostic Studies    Radiology:   CT CHEST PULMONARY EMBOLISM W CONTRAST   Final Result   1. No pulmonary emboli. 2. No thoracic aortic aneurysm or dissection. 3. Severe emphysematous changes, with apical predominance. Superimposed   patchy nodular areas of consolidation, as well as associated bronchial   thickening suggestive of bronchitis and bronchopneumonia. 4. No pleural effusion. 5. Coronary artery atherosclerosis. 6. Borderline reactive hilar lymphadenopathy. XR CHEST PORTABLE   Final Result   New airspace disease the right lung base, concerning for pneumonia or   aspiration.                 Consults:     IP CONSULT TO HOSPITALIST    Disposition:  home     Condition at Discharge: Stable    Discharge Instructions/Follow-up:  Follow up with PCP within 1-2 weeks    Code Status:  Full Code     Activity: activity as tolerated    Diet: regular diet      Discharge Medications:     Current Discharge Medication List           Details   amoxicillin-clavulanate (AUGMENTIN) 875-125 MG per tablet Take 1 tablet by mouth 2 times daily for 5 days  Qty: 10 tablet, Refills: 0              Details   gabapentin (NEURONTIN) 600 MG tablet TAKE 2 TABLETS BY MOUTH THREE TIMES A DAY AS NEEDED FOR BACK PAIN  Qty: 180 tablet, Refills: 2    Comments: Maximum Refills Reached  Associated Diagnoses: Chronic bilateral thoracic back pain      budesonide-formoterol (SYMBICORT) 160-4.5 MCG/ACT AERO INHALE 2 PUFFS TWICE A DAY  Qty: 1 each, Refills: 5    Comments: Maximum Refills Reached  Associated Diagnoses: Chronic obstructive pulmonary disease, unspecified COPD type ([facility])      albuterol sulfate  (90 Base) MCG/ACT inhaler INHALE 2 PUFFS EVERY 6 HOURS AS NEEDED WHEEZING  Qty: 8.5 g, Refills: 11    Comments: Maximum Refills Reached  Associated Diagnoses: Cigarette nicotine dependence without complication      tiotropium (SPIRIVA HANDIHALER) 18 MCG inhalation capsule Inhale 1 capsule into the lungs daily  Qty: 90 capsule, Refills: 1      ibuprofen (ADVIL;MOTRIN) 800 MG tablet TAKE 1 TABLET BY MOUTH EVERY 8 HOURS AS NEEDED PAIN WITH FOOD  Qty: 60 tablet, Refills: 0    Comments: Maximum Refills Reached  Associated Diagnoses: Chronic bilateral thoracic back pain      Respiratory Therapy Supplies (NEBULIZER/TUBING/MOUTHPIECE) KIT 1 kit by Does not apply route daily as needed (Patient is needing new equipment)  Qty: 1 kit, Refills: 0      risperiDONE (RISPERDAL) 3 MG tablet       ipratropium-albuterol (DUONEB) 0.5-2.5 (3) MG/3ML SOLN nebulizer solution Take 3 mLs by nebulization every 4 hours  Qty: 1 vial, Refills: 0    Comments: LOT: [id number]  EXP: 3/2020      aspirin 325 MG tablet Take 325 mg by mouth daily      fluticasone (FLONASE) 50 MCG/ACT nasal spray 1 spray by Each Nostril route daily  Qty: 2 Bottle, Refills: 1    Associated Diagnoses: Chronic rhinitis      traZODone (DESYREL) 100 MG tablet Take 0.5-1 tablets by mouth nightly as needed for Sleep  Qty: 90 tablet, Refills: 1    Associated Diagnoses: Primary insomnia FLUoxetine (PROZAC) 40 MG capsule Take 1 capsule by mouth daily  Qty: 30 capsule, Refills: 5    Associated Diagnoses: Severe recurrent major depressive disorder with psychotic features (HCC)      buprenorphine (SUBUTEX) 2 MG SUBL SL tablet Place 16 mg under the tongue daily. Nebulizer MISC by Does not apply route daily      topiramate (TOPAMAX) 50 MG tablet Take 50 mg by mouth daily       donepezil (ARICEPT) 5 MG tablet Take 1 tablet by mouth nightly  Qty: 12 tablet, Refills: 0             Time Spent on discharge is more than 30 minutes in the examination, evaluation, counseling and review of medications and discharge plan. Signed:    Noah Langston MD   4/14/2022      Thank you [address] [place] [address] for the opportunity to be involved in this patient's care. If you have any questions or concerns please feel free to contact me at [phone].

## 2022-04-14 NOTE — PROGRESS NOTES
Patient discharged with belongings, new medications, and follow up instructions. IV removed without complications. Patient transported to ED entrance via wheelchair.

## 2022-04-14 NOTE — PROGRESS NOTES
Oxygen documentation:     O2 saturation at REST on ROOM AIR = ___84___%     *If saturation is 89% or above please proceed with steps 2 and 3.      O2 saturation with AMBULATION of _50____ feet on ROOM AIR = __61___%   O2 saturation with AMBULATION on current liter flow = __95____%     DCP notified: ______     Signature: _Electronically signed by Whit Abbasi RN on 4/14/2022 at 2:24 PM Date: __________   Time: 8778

## 2022-04-14 NOTE — PROGRESS NOTES
Patient ambulated with RN in hallways, tolerated fairly well. O2 drop to 61% on RA, pt returned to room, placed on 2 L NC, O2 returned to 90%. Hosptalist notified.

## 2022-04-14 NOTE — CARE COORDINATION
CASE MANAGEMENT DISCHARGE SUMMARY      Discharge to: home w/[employer] home O2  IMM given: (date) 04/14/2022    New Durable Medical Equipment ordered/agency: [employer]    Transportation: private  Confirmed discharge plan with:     Patient: yes, pt will contact family   RN, name: CASSIE Son    Note: Pt able to d/c once [employer] delivers O2. CM confirmed that [employer] will pull orders and deliver. Pt educated on smoking cessation.   Denise Coleman RN

## 2022-04-14 NOTE — PROGRESS NOTES
Patient A&Ox4. VSS, O2 88% on RA, 90% on 1 L NC. Patient denies pain or nausea. Patient instructed to call out for needs. Will continue to monitor.

## 2022-04-15 ENCOUNTER — CARE COORDINATION (OUTPATIENT)
Dept: CASE MANAGEMENT | Age: 47
End: 2022-04-15

## 2022-04-15 NOTE — CARE COORDINATION
[facility] Transitions Initial Follow Up Call    Call within 2 business days of discharge: Yes    Patient: Wade Uriostegui Patient : 1975   MRN: [id number]  Reason for Admission: ARF w/ hypoxia  Discharge Date: 22 RARS: Readmission Risk Score: 11.5 ( )      Last Discharge [facility]       Complaint Diagnosis Description Type Department Provider    22 Fatigue; Shortness of Breath Acute respiratory failure with hypoxia ([address] . .. ED to Hosp-Admission (Discharged) (ADMITTED) Lillian Krishna MD; Christine Aburto. .. Spoke with: Aby Herron 226: [facility]    Attempted to reach patient via phone for initial post hospital transition call. VM left stating purpose of call along with my contact information requesting a return call.       Care Transitions 24 Hour Call    Care Transitions Interventions         Follow Up  Future Appointments   Date Time Provider Varsha Solitario   2022  2:00 PM DO ADONAY Samuel [id number] [place] Burak Pugh RN

## 2022-04-16 LAB
BLOOD CULTURE, ROUTINE: NORMAL
CULTURE, BLOOD 2: NORMAL

## 2022-04-18 ENCOUNTER — CARE COORDINATION (OUTPATIENT)
Dept: CASE MANAGEMENT | Age: 47
End: 2022-04-18

## 2022-04-18 ENCOUNTER — TELEPHONE (OUTPATIENT)
Dept: FAMILY MEDICINE CLINIC | Age: 47
End: 2022-04-18

## 2022-04-18 DIAGNOSIS — J44.1 CHRONIC OBSTRUCTIVE PULMONARY DISEASE WITH ACUTE EXACERBATION (HCC): Primary | ICD-10-CM

## 2022-04-18 PROCEDURE — 1111F DSCHRG MED/CURRENT MED MERGE: CPT | Performed by: FAMILY MEDICINE

## 2022-04-18 NOTE — TELEPHONE ENCOUNTER
[facility] Transitions Initial Follow Up Call    Outreach made within 2 business days of discharge: Yes    Patient: Wade Uriostegui Patient : 1975   MRN: [id number]  Reason for Admission: There are no discharge diagnoses documented for the most recent discharge. Discharge Date: 22       Spoke with: patient    Discharge department/facility: [facility]    Non-face-to-face services provided:  Scheduled appointment with PCP-22, patient unable to come in any sooner  Obtained and reviewed discharge summary and/or continuity of care documents  Assessment and support for treatment adherence and medication management-patient has difficulty breathing at night, discussed trying to prop himself up at night but patient states where he lives/sleeps he has difficulty being able to do that. Patient currently on 2L o2 via NC and is supposed to start with [facility] today. Patient has difficulty with affording nebulizer treatment  Assistance in accessing community resources-reached out to pharmacist and care coordination to assist with patient resources and medication    TCM Interactive Patient Contact:  Was patient able to fill all prescriptions: Yes  Was patient instructed to bring all medications to the follow-up visit: Yes  Is patient taking all medications as directed in the discharge summary?  Yes  Does patient understand their discharge instructions: Yes  Does patient have questions or concerns that need addressed prior to 7-14 day follow up office visit: no    Scheduled appointment with PCP within 7-14 days    Follow Up  Future Appointments   Date Time Provider Varsha Solitario   2022  2:00 PM [address] [place] [address], RN

## 2022-04-18 NOTE — CARE COORDINATION
[facility] Transitions Initial Follow Up Call    Call within 2 business days of discharge: Yes    Patient: Sharon Sena Patient : 1975   MRN: [id number]  Reason for Admission: ARF  Discharge Date: 22 RARS: Readmission Risk Score: 11.5 ( )      Last Discharge [facility]       Complaint Diagnosis Description Type Department Provider    22 Fatigue; Shortness of Breath Acute respiratory failure with hypoxia ([address] . .. ED to Hosp-Admission (Discharged) (ADMITTED) Tawanda Del Valle MD; Sandy Alves. .. Spoke with: Aby Herron 226: [facility]    Non-face-to-face services provided:  Obtained and reviewed discharge summary and/or continuity of care documents     . Transitions of Care Initial Call        Challenges to be reviewed by the provider   Additional needs identified to be addressed with provider: No  none             Method of communication with provider : none      Advance Care Planning:   Does patient have an Advance Directive: reviewed and current, reviewed and needs to be updated, not on file; education provided, not on file, patient declined education, decision maker updated and referral to internal ACP facilitator. Was this a readmission? No  Patient stated reason for admission: shortness of breath  Patients top risk factors for readmission: medical condition-ARF    Care Transition Nurse (CTN) contacted the patient by telephone to perform post hospital discharge assessment. Verified name and  with patient as identifiers. Provided introduction to self, and explanation of the CTN role. CTN reviewed discharge instructions, medical action plan and red flags with patient who verbalized understanding. Patient given an opportunity to ask questions and does not have any further questions or concerns at this time. Were discharge instructions available to patient? Yes.  Reviewed appropriate site of care based on symptoms and resources available to patient including: PCP. The patient agrees to contact the PCP office for questions related to their healthcare. Medication reconciliation was performed with patient, who verbalizes understanding of administration of home medications. Advised obtaining a 90-day supply of all daily and as-needed medications. Covid Risk Education     Educated patient about risk for severe COVID-19 due to risk factors according to CDC guidelines. CTN reviewed discharge instructions, medical action plan and red flag symptoms with the patient who verbalized understanding. Discussed COVID vaccination status: Yes. Education provided on COVID-19 vaccination as appropriate. Discussed exposure protocols and quarantine with CDC Guidelines. Patient was given an opportunity to verbalize any questions and concerns and agrees to contact CTN or health care provider for questions related to their healthcare. Reviewed and educated patient on any new and changed medications related to discharge diagnosis. Was patient discharged with a pulse oximeter? No Discussed and confirmed pulse oximeter discharge instructions and when to notify provider or seek emergency care. Patient answered call and verified . Patient pleasant and agreeable to transition call. Patient happy to be back home. Requesting assistance with Medicare number. Patient has lost card 2 years ago and has not received new card. CTN provided number. Medication list reviewed and noted in system. Patient scheduled follow up visit with PCP and noted in system. Denied any acute needs at present time. Agreeable to f/u calls. Educated on the use of urgent care or physicians 24 hr access line if assistance is needed after hours. CTN provided contact information. Plan for follow-up call in 5-7 days based on severity of symptoms and risk factors.       Care Transitions 24 Hour Call    Do you have any ongoing symptoms?: No  Do you have a copy of your discharge instructions?:

## 2022-04-18 NOTE — TELEPHONE ENCOUNTER
Per pharmacist, Adrian Augustin would be best option for patient for both supplies and drug treatments based on his insurance. Patients nebulizer treatment should be covered, or at least more affordable.        Rebekah in [facility] [phone] (Varsha Varela, [address])

## 2022-04-18 NOTE — TELEPHONE ENCOUNTER
Please see note below, patient is having difficulty with affording nebulizer treatment. I have reached out to pharmacist to see if there are any options for him to help with cost. He states it is $400 a month. Informed patient case management/care coordination has attempted to reach him. I told him I would send message to you and that you would be in touch again. Patient also requesting shower chair and walker with a seat so he can rest when he gets SOB/tired. I told him that would be something PCP could get ordered for him during TCM appt on 4/28. Thanks!

## 2022-04-25 DIAGNOSIS — M54.6 CHRONIC BILATERAL THORACIC BACK PAIN: ICD-10-CM

## 2022-04-25 DIAGNOSIS — G89.29 CHRONIC BILATERAL THORACIC BACK PAIN: ICD-10-CM

## 2022-04-25 RX ORDER — GABAPENTIN 600 MG/1
TABLET ORAL
Qty: 180 TABLET | Refills: 2 | Status: SHIPPED | OUTPATIENT
Start: 2022-04-25 | End: 2022-07-25

## 2022-04-25 NOTE — TELEPHONE ENCOUNTER
.  Refill Request     Last Seen: Last Seen Department: 10/28/2021  Last Seen by PCP: 10/28/2021    Last Written: 1-3-22 180 with 2     Next Appointment:   Future Appointments   Date Time Provider Varsha Solitario   4/28/2022  2:00 PM DO ADONAY Sosa  [place] [facility]       Future appointment scheduled      Requested Prescriptions     Pending Prescriptions Disp Refills    gabapentin (NEURONTIN) 600 MG tablet [Pharmacy Med Name: Gabapentin 600MG TABS] 180 tablet 2     Sig: TAKE 2 TABLETS BY MOUTH THREE TIMES A DAY AS NEEDED FOR BACK PAIN

## 2022-04-26 ENCOUNTER — CARE COORDINATION (OUTPATIENT)
Dept: CASE MANAGEMENT | Age: 47
End: 2022-04-26

## 2022-04-26 NOTE — CARE COORDINATION
[facility] Transitions Follow Up Call    2022    Patient: Linh Fernández  Patient : 1975   MRN: [id number]  Reason for Admission: ARF w/ hypoxia  Discharge Date: 22 RARS: Readmission Risk Score: 11.5 ( )         Spoke with: Linh Fernández    Needs to be reviewed by the provider   Additional needs identified to be addressed with provider: No  none         Method of communication with provider : none    Care Transition Nurse (CTN) contacted the patient by telephone to follow up after recent hospital admission. Addressed changes since last contact: none  Discussed follow-up appointments. If no appointment was previously scheduled, appointment scheduling offered: Yes. Non-[facility] follow up appointment(s): na    Advance Care Planning:   Does patient have an Advance Directive: reviewed and current, reviewed and needs to be updated, not on file; education provided, not on file, patient declined education, decision maker updated, referral to internal ACP facilitator and Reviewed on prior CTN outreach. Discussed appropriate site of care based on symptoms and resources available to patient including: PCP. The patient agrees to contact the PCP office for questions related to their healthcare. Patients top risk factors for readmission: medical condition-ARF  Interventions to address risk factors: Obtained and reviewed discharge summary and/or continuity of care documents      Patient answered call and verified . Patient pleasant and agreeable to transition call. Patient is feeling well. Confirmed that he received DME of walker and BSC. Oxygen in place and using as directed. Follow up appt scheduled for later this week and confirmed transportation to appt. Denies any acute needs at present time. Agreeable to f/u calls. Educated on the use of urgent care or physicians 24 hr access line if assistance is needed after hours. CTN provided contact information for future needs. Plan for follow-up call in 7-10 days based on severity of symptoms and risk factors. Care Transitions Subsequent and Final Call    Subsequent and Final Calls  Do you have any ongoing symptoms?: No  Have your medications changed?: No  Do you have any questions related to your medications?: No  Do you currently have any active services?: No  Do you have any needs or concerns that I can assist you with?: No  Identified Barriers: None  Care Transitions Interventions  Other Interventions:            Follow Up  Future Appointments   Date Time Provider Varsha Solitario   4/28/2022  2:00 PM Clara Schroeder DO [address]       Anthony Vincent RN

## 2022-05-03 ENCOUNTER — CARE COORDINATION (OUTPATIENT)
Dept: CASE MANAGEMENT | Age: 47
End: 2022-05-03

## 2022-05-03 NOTE — CARE COORDINATION
[facility] Transitions Follow Up Call    5/3/2022    Patient: Priscila Calderon  Patient : 1975   MRN: [id number]  Reason for Admission: ARF w/ hypoxia  Discharge Date: 22 RARS: Readmission Risk Score: 11.5 ( )         Spoke with: Priscila Calderon    Needs to be reviewed by the provider   Additional needs identified to be addressed with provider: No  none         Method of communication with provider : none    Care Transition Nurse (CTN) contacted the patient by telephone to follow up after recent hospital admission. Addressed changes since last contact: none  Discussed follow-up appointments. If no appointment was previously scheduled, appointment scheduling offered: Yes. Non-[facility] follow up appointment(s): na    Advance Care Planning:   Does patient have an Advance Directive: reviewed and current, reviewed and needs to be updated, not on file; education provided, not on file, patient declined education, decision maker updated, referral to internal ACP facilitator and Reviewed on prior CTN outreach. Discussed appropriate site of care based on symptoms and resources available to patient including: PCP. The patient agrees to contact the PCP office for questions related to their healthcare. Patients top risk factors for readmission: medical condition-ARF  Interventions to address risk factors: Obtained and reviewed discharge summary and/or continuity of care documents    Patient answered call and verified . Patient pleasant and agreeable to transition call. Patient feeling well and was busy at time of call. Conversation was brief. Confirmed that he was taking all medication as directed and denied any concerns with prescriptions. Denied any acute needs at present time. Agreeable to f/u calls. Educated on the use of urgent care or physicians 24 hr access line if assistance is needed after hours. CTN provided contact information for future needs.      Plan for follow-up call in 7-10 days based on severity of symptoms and risk factors. Care Transitions Subsequent and Final Call    Subsequent and Final Calls  Do you have any ongoing symptoms?: No  Have your medications changed?: No  Do you have any questions related to your medications?: No  Do you currently have any active services?: No  Do you have any needs or concerns that I can assist you with?: No  Identified Barriers: None  Care Transitions Interventions  Other Interventions:            Follow Up  Future Appointments   Date Time Provider Varsha Solitario   5/9/2022  1:30 PM [address] [place] [address] DO [address]       Agapito Galaviz RN

## 2022-05-10 ENCOUNTER — CARE COORDINATION (OUTPATIENT)
Dept: CASE MANAGEMENT | Age: 47
End: 2022-05-10

## 2022-05-10 NOTE — CARE COORDINATION
Care Transitions Follow Up Call    Needs to be reviewed by the provider   Additional needs identified to be addressed with provider: No  none             Method of communication with provider : none      Care Transition Nurse (CTN) contacted the patient by telephone to follow up after admission. Verified name and  with patient as identifiers. Addressed changes since last contact: none  Discussed follow-up appointments. If no appointment was previously scheduled, appointment scheduling offered: No.   Is follow up appointment scheduled within 7 days of discharge? completed. CTN reviewed discharge instructions, medical action plan and red flags with patient and discussed any barriers to care and/or understanding of plan of care after discharge. Discussed appropriate site of care based on symptoms and resources available to patient including: PCP  Specialist  When to call 911. The patient agrees to contact the PCP office for questions related to their healthcare. Patients top risk factors for readmission: medical condition-hypoxia requiring O@  Interventions to address risk factors: Assessment and support for treatment adherence and medication management-       CTN provided contact information for future needs. Plan for follow-up call in 5-7 days based on severity of symptoms and risk factors. Plan for next call: symptom management-        State he's doing well. Reports he uses O2 only at night and his O2 sat has been around 99%. Reports fair appetite with normal elimination patterns. His PCP is ordering shower chair and walker but he states hes getting around much better now. Reports taking medications as prescribed and denies questions or concerns at this time.   walker

## 2022-05-10 NOTE — CARE COORDINATION
[facility] Transitions Follow Up Call    5/10/2022    Patient: Marlyn Bhakta  Patient : 1975   MRN: [id number]  Reason for Admission: ARF  Discharge Date: 22 RARS: Readmission Risk Score: 11.5 ( )         Spoke with: [facility] Transitions Follow Up Call    Needs to be reviewed by the provider   Additional needs identified to be addressed with provider: No  none             Method of communication with provider : none      Care Transition Nurse (CTN) contacted the patient by telephone to follow up after admission. Verified name and  with patient as identifiers. Addressed changes since last contact: none  Discussed follow-up appointments. If no appointment was previously scheduled, appointment scheduling offered: Yes. Is follow up appointment scheduled within 7 days of discharge? No.    Advance Care Planning:   Does patient have an Advance Directive: reviewed and current, reviewed and needs to be updated, not on file; education provided, not on file, patient declined education, decision maker updated and referral to internal ACP facilitator. CTN reviewed discharge instructions, medical action plan and red flags with patient and discussed any barriers to care and/or understanding of plan of care after discharge. Discussed appropriate site of care based on symptoms and resources available to patient including: PCP. The patient agrees to contact the PCP office for questions related to their healthcare. Patient answered call and verified . Patient pleasant and agreeable to transition call. Patient stated that he is trying to keep busy. Denied any pain or shortness of breath. Appetite is good and no problems with bowel or bladder. Denied any acute needs at present time. Educated on the use of urgent care or physicians 24 hr access line if assistance is needed after hours. Patient stable and episode ended. CTN provided contact information for future needs.  No further follow-up call indicated based on severity of symptoms and risk factors. Care Transitions Subsequent and Final Call    Subsequent and Final Calls  Do you have any ongoing symptoms?: No  Have your medications changed?: No  Do you have any questions related to your medications?: No  Do you currently have any active services?: No  Do you have any needs or concerns that I can assist you with?: No  Identified Barriers: None  Care Transitions Interventions  Other Interventions:            Follow Up  Future Appointments   Date Time Provider Varsha Solitario   5/19/2022  9:00 AM [address] [place] [address] DO Margot Queen 149       Vinita Peck RN

## 2022-05-11 ENCOUNTER — TELEMEDICINE (OUTPATIENT)
Dept: PRIMARY CARE CLINIC | Age: 47
End: 2022-05-11
Payer: MEDICARE

## 2022-05-11 DIAGNOSIS — J31.0 CHRONIC RHINITIS: ICD-10-CM

## 2022-05-11 DIAGNOSIS — B96.89 ACUTE BACTERIAL SINUSITIS: Primary | ICD-10-CM

## 2022-05-11 DIAGNOSIS — J01.90 ACUTE BACTERIAL SINUSITIS: Primary | ICD-10-CM

## 2022-05-11 PROCEDURE — 99213 OFFICE O/P EST LOW 20 MIN: CPT | Performed by: NURSE PRACTITIONER

## 2022-05-11 PROCEDURE — 1111F DSCHRG MED/CURRENT MED MERGE: CPT | Performed by: NURSE PRACTITIONER

## 2022-05-11 PROCEDURE — G8427 DOCREV CUR MEDS BY ELIG CLIN: HCPCS | Performed by: NURSE PRACTITIONER

## 2022-05-11 RX ORDER — AMOXICILLIN AND CLAVULANATE POTASSIUM 875; 125 MG/1; MG/1
1 TABLET, FILM COATED ORAL 2 TIMES DAILY
Qty: 20 TABLET | Refills: 0 | Status: SHIPPED | OUTPATIENT
Start: 2022-05-11 | End: 2022-05-21

## 2022-05-11 RX ORDER — FLUTICASONE PROPIONATE 50 MCG
1 SPRAY, SUSPENSION (ML) NASAL DAILY
Qty: 2 EACH | Refills: 3 | Status: SHIPPED | OUTPATIENT
Start: 2022-05-11 | End: 2022-08-04 | Stop reason: SDUPTHER

## 2022-05-11 ASSESSMENT — ENCOUNTER SYMPTOMS
SORE THROAT: 0
SHORTNESS OF BREATH: 1
SINUS PRESSURE: 1
WHEEZING: 0
COUGH: 1
CHEST TIGHTNESS: 0
GASTROINTESTINAL NEGATIVE: 1

## 2022-05-11 NOTE — PATIENT INSTRUCTIONS
Notify office if you do not improve or have worsening of condition. May want to add prednisone if no improvement. Take medication as prescribed. Patient is out of Flonase will reorder. Take antibiotics medication until all gone. Drink plenty of fluids and rest.  Practice good hand hygiene. May sleep with humidifier as needed. (uses CPAP)  Gargle with warm salt water 3-4 times a day to help with sore throat. You can use ice, warm chicken noodle soup, soft foods, popsicles and cough drops to help soothe your throat. May take Tylenol/Ibuprofen (alternate) as needed for fever/pain. Patient Education        Rhinitis: Care Instructions  Overview  Rhinitis is swelling and irritation in the nose. Allergies and infections are often the cause. Your nose may run or feel stuffy. Other symptoms are itchy andsore eyes, ears, throat, and mouth. If allergies are the cause, your doctor may do tests to find out what you are allergic to. You may be able to stop symptoms if you avoid the things thatcause them. Your doctor may suggest or prescribe medicine to ease your symptoms. Follow-up care is a key part of your treatment and safety. Be sure to make and go to all appointments, and call your doctor if you are having problems. It's also a good idea to know your test results and keep alist of the medicines you take. How can you care for yourself at home?  If your rhinitis is caused by allergies, try to find out what sets off (triggers) your symptoms. Take steps to avoid your triggers. ? Avoid yard work. It can stir up both pollen and mold. ? Do not smoke or allow others to smoke around you. If you need help quitting, talk to your doctor about stop-smoking programs and medicines. These can increase your chances of quitting for good. ? Do not use aerosol sprays, cleaning products, or perfumes. ? If pollen is one of your triggers, close your house and car windows during blooming season. ?  Clean your house often to control dust.  ? Keep pets outside.  If your doctor recommends over-the-counter medicines to relieve symptoms, take your medicines exactly as prescribed. Call your doctor if you think you are having a problem with your medicine.  Use saline (saltwater) nasal washes to help keep your nasal passages open and wash out mucus and allergens. You can buy saline nose sprays at a grocery store or drugstore. Or you can make your own at home by adding 1 teaspoon of non-iodized salt and 1 teaspoon of baking soda to 2 cups of distilled or boiled and cooled water. If you make your own, fill a squeeze bottle or neti pot with the solution, insert the tip into your nostril, and lean over the sink. Gently squirt the solution with your mouth open and repeat on the other side. When should you call for help? Call your doctor now or seek immediate medical care if:     You are having trouble breathing. Watch closely for changes in your health, and be sure to contact your doctor if:     Mucus from your nose gets thicker (like pus) or has new blood in it.      You have new or worse symptoms.      You do not get better as expected. Where can you learn more? Go to https://Milanoo.compeArmune BioScience.Entelo. org and sign in to your D[employer] account. Enter M030 in the [facility] box to learn more about \"Rhinitis: Care Instructions. \"     If you do not have an account, please click on the \"Sign Up Now\" link. Current as of: September 8, 2021               Content Version: 13.2  © 2006-2022 Healthwise, Incorporated. Care instructions adapted under license by [facility] ([facility]). If you have questions about a medical condition or this instruction, always ask your healthcare professional. [place] [address] any warranty or liability for your use of this information.          Patient Education        Sinusitis: Care Instructions  Your Care Instructions     Sinusitis is an infection of the lining of the sinus cavities in your head. Sinusitis often follows a cold. It causes pain and pressure in your head andface. In most cases, sinusitis gets better on its own in 1 to 2 weeks. But some mildsymptoms may last for several weeks. Sometimes antibiotics are needed. Follow-up care is a key part of your treatment and safety. Be sure to make and go to all appointments, and call your doctor if you are having problems. It's also a good idea to know your test results and keep alist of the medicines you take. How can you care for yourself at home?  Take an over-the-counter pain medicine, such as acetaminophen (Tylenol), ibuprofen (Advil, Motrin), or naproxen (Aleve). Read and follow all instructions on the label.  If the doctor prescribed antibiotics, take them as directed. Do not stop taking them just because you feel better. You need to take the full course of antibiotics.  Be careful when taking over-the-counter cold or flu medicines and Tylenol at the same time. Many of these medicines have acetaminophen, which is Tylenol. Read the labels to make sure that you are not taking more than the recommended dose. Too much acetaminophen (Tylenol) can be harmful.  Breathe warm, moist air from a steamy shower, a hot bath, or a sink filled with hot water. Avoid cold, dry air. Using a humidifier in your home may help. Follow the directions for cleaning the machine.  Use saline (saltwater) nasal washes. This can help keep your nasal passages open and wash out mucus and bacteria. You can buy saline nose drops at a grocery store or drugstore. Or you can make your own at home by adding 1 teaspoon of salt and 1 teaspoon of baking soda to 2 cups of distilled water. If you make your own, fill a bulb syringe with the solution, insert the tip into your nostril, and squeeze gently. Celestina Marinas your nose.  Put a hot, wet towel or a warm gel pack on your face 3 or 4 times a day for 5 to 10 minutes each time.    Try a decongestant nasal spray like oxymetazoline (Afrin). Do not use it for more than 3 days in a row. Using it for more than 3 days can make your congestion worse. When should you call for help? Call your doctor now or seek immediate medical care if:     You have new or worse swelling or redness in your face or around your eyes.      You have a new or higher fever. Watch closely for changes in your health, and be sure to contact your doctor if:     You have new or worse facial pain.      The mucus from your nose becomes thicker (like pus) or has new blood in it.      You are not getting better as expected. Where can you learn more? Go to https://V[employer] TherapeuticspeSeakeeper.Gtxh. org and sign in to your [employer] account. Enter V729 in the "[employer] box to learn more about \"Sinusitis: Care Instructions. \"     If you do not have an account, please click on the \"Sign Up Now\" link. Current as of: September 8, 2021               Content Version: 13.2  © 2006-2022 Healthwise, Incorporated. Care instructions adapted under license by [facility] ([facility]). If you have questions about a medical condition or this instruction, always ask your healthcare professional. [place] [address] any warranty or liability for your use of this information.

## 2022-05-11 NOTE — LETTER
I had the pleasure of seeing Alanna Bo today for a primary care virtualist video visit secondary to Sinusitis. I have provided the following recommendations: Please refer to note. I have included my note for your review and have asked the patient to follow up with you if no improvement/worsening. If you have questions, please reach out via [employer] secure messaging by searching for the [facility] Primary Care Virtualists. Your communication will be answered promptly by the Virtualist on service for the day. Additionally, we would love your overall feedback on this visit. Please hit shift and click the following link to let us know if the Virtualist service met your expectations. [employer].. [email or web address]      Electronically signed by LING Hicks CNP on 5/11/22 at 4:47 PM EDT

## 2022-05-11 NOTE — PROGRESS NOTES
2022    TELEHEALTH EVALUATION -- Audio/Visual (During ZBEPL-70 public health emergency)    Chief Complaint   Patient presents with    Congestion    Sinusitis    Cough     HPI:    Kimi Blake (:  1975) has requested an audio/video evaluation for the following concern(s):    Request visit for sinus pressure. Congestion, spiting up green mucus, +PND and cough. Has history of COPD. No CP. SOB which is at his usual,and he takes O2 at night and uses CPAP but would like to try mask Vs. Nasal cannula. No fever/chills. No N/V/D. Review of Systems   Constitutional: Negative for chills, fatigue and fever. HENT: Positive for congestion, postnasal drip and sinus pressure. Negative for sore throat. Respiratory: Positive for cough and shortness of breath (at baseline per patient). Negative for chest tightness and wheezing. Cardiovascular: Negative. Gastrointestinal: Negative. Genitourinary: Negative. Musculoskeletal: Negative. Skin: Negative. Neurological: Negative. Psychiatric/Behavioral: Negative. Prior to Visit Medications    Medication Sig Taking?  Authorizing Provider   fluticasone (FLONASE) 50 MCG/ACT nasal spray 1 spray by Each Nostril route daily Yes LING Almaguer CNP   amoxicillin-clavulanate (AUGMENTIN) 875-125 MG per tablet Take 1 tablet by mouth 2 times daily for 10 days Yes LING Almaguer CNP   gabapentin (NEURONTIN) 600 MG tablet TAKE 2 TABLETS BY MOUTH THREE TIMES A DAY AS NEEDED FOR BACK PAIN  Eric Alonso DO   budesonide-formoterol (SYMBICORT) 160-4.5 MCG/ACT AERO INHALE 2 PUFFS TWICE A DAY  Magy Fenton MD   albuterol sulfate  (90 Base) MCG/ACT inhaler INHALE 2 PUFFS EVERY 6 HOURS AS NEEDED WHEEZING  Eric Alonso DO   tiotropium (SPIRIVA HANDIHALER) 18 MCG inhalation capsule Inhale 1 capsule into the lungs daily  Magy Fenton MD   ibuprofen (ADVIL;MOTRIN) 800 MG tablet TAKE 1 TABLET BY MOUTH EVERY 8 HOURS AS NEEDED PAIN WITH FOOD  LING Kebede - CNP   Respiratory Therapy Supplies (NEBULIZER/TUBING/MOUTHPIECE) KIT 1 kit by Does not apply route daily as needed (Patient is needing new equipment)  Cari Castillo MD   risperiDONE (RISPERDAL) 3 MG tablet   Historical Provider, MD   ipratropium-albuterol (DUONEB) 0.5-2.5 (3) MG/3ML SOLN nebulizer solution Take 3 mLs by nebulization every 4 hours  Lexus Patel MD   aspirin 325 MG tablet Take 325 mg by mouth daily  Historical Provider, MD   traZODone (DESYREL) 100 MG tablet Take 0.5-1 tablets by mouth nightly as needed for Sleep  Eric Alonso DO   FLUoxetine (PROZAC) 40 MG capsule Take 1 capsule by mouth daily  Patient not taking: Reported on 4/12/2022  Eric Alonso DO   buprenorphine (SUBUTEX) 2 MG SUBL SL tablet Place 16 mg under the tongue daily.    Historical Provider, MD   Nebulizer MISC by Does not apply route daily  Historical Provider, MD   topiramate (TOPAMAX) 50 MG tablet Take 50 mg by mouth daily   Patient not taking: Reported on 4/12/2022  Historical Provider, MD   donepezil (ARICEPT) 5 MG tablet Take 1 tablet by mouth nightly  Patient not taking: Reported on 4/12/2022  LING Borrego - CNP       Social History     Tobacco Use    Smoking status: Current Every Day Smoker     Packs/day: 1.00     Years: 35.00     Pack years: 35.00     Types: Cigarettes    Smokeless tobacco: Never Used    Tobacco comment: 5/25/21 - smokes 1 ppd/dlk   Vaping Use    Vaping Use: Some days    Substances: Nicotine    Devices: Pre-filled or refillable cartridge   Substance Use Topics    Alcohol use: Yes     Comment: occ    Drug use: Not Currently     Comment: Quit using heroin 4 months ago        Allergies   Allergen Reactions    Mustard Seed Swelling     Throat closes up   ,   Past Medical History:   Diagnosis Date    Arthritis     Back disorder     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    Back pain     missing disc    COPD (chronic obstructive pulmonary disease) (HCC)     Hypoglycemia, unspecified     Knee instability     left knee unstable    Memory loss     Panic attacks     Psychiatric problem     Unspecified cerebral artery occlusion with cerebral infarction 2014    no weakness or speech problems; pt has dementia   ,   Past Surgical History:   Procedure Laterality Date    HUMERUS FRACTURE SURGERY Right 4/1/2019    OPEN 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       CHECKPOINT; BIOMET performed by Parris Way MD at [facility]   ,   Social History     Tobacco Use    Smoking status: Current Every Day Smoker     Packs/day: 1.00     Years: 35.00     Pack years: 35.00     Types: Cigarettes    Smokeless tobacco: Never Used    Tobacco comment: 5/25/21 - smokes 1 ppd/dlk   Vaping Use    Vaping Use: Some days    Substances: Nicotine    Devices: Pre-filled or refillable cartridge   Substance Use Topics    Alcohol use: Yes     Comment: occ    Drug use: Not Currently     Comment: Quit using heroin 4 months ago   ,   Family History   Problem Relation Age of Onset    Diabetes Mother     Cancer Mother     Cancer Father    ,   Immunization History   Administered Date(s) Administered    COVID-19, [facility], Primary or Immunocompromised, PF, 100mcg/0.5mL 04/14/2021, 05/25/2021    Pneumococcal Polysaccharide ([id number]) 09/21/2018    Tdap (Boostrix, Adacel) 12/03/2019       PHYSICAL EXAMINATION:  [ INSTRUCTIONS:  \"[x]\" Indicates a positive item  \"[]\" Indicates a negative item  -- DELETE ALL ITEMS NOT EXAMINED]  Vital Signs: (As obtained by patient/caregiver or practitioner observation)    Blood pressure-  Heart rate-    Respiratory rate-    Temperature-  Pulse oximetry-     Constitutional: [x] Appears well-developed and well-nourished [x] No apparent distress      [] Abnormal-   Mental status  [x] Alert and awake  [x] Oriented to person/place/time [x]Able to follow commands      Eyes:  EOM    [x]  Normal  [] Abnormal-  Sclera  [x]  Normal  [] Abnormal - Discharge [x]  None visible  [] Abnormal -    HENT:   [x] Normocephalic, atraumatic. [] Abnormal   [] Mouth/Throat: Mucous membranes are moist.     External Ears [x] Normal  [] Abnormal-     Neck: [x] No visualized mass     Pulmonary/Chest: [x] Respiratory effort normal.  [x] No visualized signs of difficulty breathing or respiratory distress        [] Abnormal-      Musculoskeletal:   [] Normal gait with no signs of ataxia         [x] Normal range of motion of neck        [] Abnormal-       Neurological:        [x] No Facial Asymmetry (Cranial nerve 7 motor function) (limited exam to video visit)          [x] No gaze palsy        [] Abnormal-         Skin:        [x] No significant exanthematous lesions or discoloration noted on facial skin         [] Abnormal-            Psychiatric:       [x] Normal Affect [] No Hallucinations        [] Abnormal-     Other pertinent observable physical exam findings-     ASSESSMENT/PLAN:  1. Acute bacterial sinusitis  Notify office if you do not improve or have worsening of condition. May want to add prednisone if no improvement. Take medication as prescribed. Patient is out of Flonase will reorder. Take antibiotics medication until all gone. Drink plenty of fluids and rest.  Practice good hand hygiene. May sleep with humidifier as needed. (uses CPAP)  Gargle with warm salt water 3-4 times a day to help with sore throat. You can use ice, warm chicken noodle soup, soft foods, popsicles and cough drops to help soothe your throat. May take Tylenol/Ibuprofen (alternate) as needed for fever/pain. Please refer to educational handouts attached to AVS.    - amoxicillin-clavulanate (AUGMENTIN) 875-125 MG per tablet; Take 1 tablet by mouth 2 times daily for 10 days  Dispense: 20 tablet; Refill: 0    2. Chronic rhinitis  Notify office if you have no improvement or worsening of condition. Take medication as prescribed. See above plan.     - fluticasone (FLONASE) 50 MCG/ACT nasal spray; 1 spray by Each Nostril route daily  Dispense: 2 each; Refill: 3      Return if symptoms worsen or fail to improve. Alicetis Mortimer, was evaluated through a synchronous (real-time) audio-video encounter. The patient (or guardian if applicable) is aware that this is a billable service, which includes applicable co-pays. This Virtual Visit was conducted with patient's (and/or legal guardian's) consent. The visit was conducted pursuant to the emergency declaration under the [facility], [id number] waiver authority and the [employer] Act. Patient identification was verified, and a caregiver was present when appropriate. The patient was located at home in a state where the provider was licensed to provide care. Total time spent on this encounter: 25 minutes    --LING Gunn CNP on 5/11/2022 at 4:47 PM    An electronic signature was used to authenticate this note.

## 2022-05-16 ENCOUNTER — TELEPHONE (OUTPATIENT)
Dept: FAMILY MEDICINE CLINIC | Age: 47
End: 2022-05-16

## 2022-05-17 ENCOUNTER — TELEMEDICINE (OUTPATIENT)
Dept: FAMILY MEDICINE CLINIC | Age: 47
End: 2022-05-17
Payer: MEDICARE

## 2022-05-17 DIAGNOSIS — G89.29 CHRONIC BILATERAL THORACIC BACK PAIN: ICD-10-CM

## 2022-05-17 DIAGNOSIS — F17.210 CIGARETTE NICOTINE DEPENDENCE WITHOUT COMPLICATION: ICD-10-CM

## 2022-05-17 DIAGNOSIS — J44.9 CHRONIC OBSTRUCTIVE PULMONARY DISEASE, UNSPECIFIED COPD TYPE (HCC): Primary | ICD-10-CM

## 2022-05-17 DIAGNOSIS — M54.6 CHRONIC BILATERAL THORACIC BACK PAIN: ICD-10-CM

## 2022-05-17 DIAGNOSIS — F33.3 SEVERE RECURRENT MAJOR DEPRESSIVE DISORDER WITH PSYCHOTIC FEATURES (HCC): ICD-10-CM

## 2022-05-17 DIAGNOSIS — J44.9 CHRONIC OBSTRUCTIVE PULMONARY DISEASE, UNSPECIFIED COPD TYPE (HCC): ICD-10-CM

## 2022-05-17 DIAGNOSIS — F03.90 DEMENTIA WITHOUT BEHAVIORAL DISTURBANCE, UNSPECIFIED DEMENTIA TYPE: ICD-10-CM

## 2022-05-17 DIAGNOSIS — E78.2 MIXED HYPERLIPIDEMIA: ICD-10-CM

## 2022-05-17 PROCEDURE — 99214 OFFICE O/P EST MOD 30 MIN: CPT | Performed by: FAMILY MEDICINE

## 2022-05-17 PROCEDURE — G8427 DOCREV CUR MEDS BY ELIG CLIN: HCPCS | Performed by: FAMILY MEDICINE

## 2022-05-17 ASSESSMENT — ENCOUNTER SYMPTOMS: BACK PAIN: 1

## 2022-05-17 NOTE — PROGRESS NOTES
Lubna Briseno is a 48 y.o. male    Chief Complaint   Patient presents with    COPD     has not seen pulmonology but is needing a new oxygen face mask    Depression    Back Pain       HPI:    This is a video visit. Consent has been obtained. The patient is at home. COPD   This is a chronic problem. The current episode started more than 1 year ago. The problem occurs daily. The problem has been gradually worsening. His symptoms are aggravated by any activity. His symptoms are alleviated by beta-agonist. He reports moderate improvement on treatment. His past medical history is significant for COPD.      Major depression with psychotic features. This is a chronic condition. Depression is stable. He takes Prozac daily. He also takes Risperdal at night. Helps calm racing thoughts. Used to hear voices and have visual hallucinations but none since being on Risperdal. Anxiety is present while wearing a seatbelt.      Chronic back pain. This is a chronic problem. The current episode started more than 1 year ago. The problem occurs constantly. The problem is unchanged. The pain is present in the lumbar spine. Associated symptoms include tingling. Risk factors include sedentary lifestyle. Treatments tried: gabapentin. The treatment provided significant relief.      Insomnia, primary. This is a chronic condition. Initially, Trazodone did not help but it has lately been helping. Also taking Risperidone as well.     Nicotine dependence. Smokes 1 PPD for 40 years. No desire to quit smoking. He does have underlying mental illness as noted above, making Chantix a difficult option. ROS:    Review of Systems   Musculoskeletal: Positive for back pain. Psychiatric/Behavioral: Positive for dysphoric mood. There were no vitals taken for this visit. Physical Exam:    Physical Exam  Constitutional:       General: He is not in acute distress. Appearance: Normal appearance. He is normal weight.  He is not ill-appearing or toxic-appearing. HENT:      Head: Normocephalic. Neurological:      Mental Status: He is alert. Psychiatric:         Mood and Affect: Mood normal.         Behavior: Behavior normal.         Thought Content: Thought content normal.         Current Outpatient Medications   Medication Sig Dispense Refill    Misc. Devices KIT Full face mask. Use as directed with oxygen for COPD. Phone: [phone] 1 kit 5    fluticasone (FLONASE) 50 MCG/ACT nasal spray 1 spray by Each Nostril route daily 2 each 3    amoxicillin-clavulanate (AUGMENTIN) 875-125 MG per tablet Take 1 tablet by mouth 2 times daily for 10 days 20 tablet 0    gabapentin (NEURONTIN) 600 MG tablet TAKE 2 TABLETS BY MOUTH THREE TIMES A DAY AS NEEDED FOR BACK PAIN 180 tablet 2    budesonide-formoterol (SYMBICORT) 160-4.5 MCG/ACT AERO INHALE 2 PUFFS TWICE A DAY 1 each 5    albuterol sulfate  (90 Base) MCG/ACT inhaler INHALE 2 PUFFS EVERY 6 HOURS AS NEEDED WHEEZING 8.5 g 11    tiotropium (SPIRIVA HANDIHALER) 18 MCG inhalation capsule Inhale 1 capsule into the lungs daily 90 capsule 1    ibuprofen (ADVIL;MOTRIN) 800 MG tablet TAKE 1 TABLET BY MOUTH EVERY 8 HOURS AS NEEDED PAIN WITH FOOD 60 tablet 0    Respiratory Therapy Supplies (NEBULIZER/TUBING/MOUTHPIECE) KIT 1 kit by Does not apply route daily as needed (Patient is needing new equipment) 1 kit 0    risperiDONE (RISPERDAL) 3 MG tablet       ipratropium-albuterol (DUONEB) 0.5-2.5 (3) MG/3ML SOLN nebulizer solution Take 3 mLs by nebulization every 4 hours 1 vial 0    aspirin 325 MG tablet Take 325 mg by mouth daily      traZODone (DESYREL) 100 MG tablet Take 0.5-1 tablets by mouth nightly as needed for Sleep 90 tablet 1    FLUoxetine (PROZAC) 40 MG capsule Take 1 capsule by mouth daily 30 capsule 5    buprenorphine (SUBUTEX) 2 MG SUBL SL tablet Place 16 mg under the tongue daily.        Nebulizer MISC by Does not apply route daily      topiramate (TOPAMAX) 50 MG tablet Take 50 mg by mouth daily       donepezil (ARICEPT) 5 MG tablet Take 1 tablet by mouth nightly 12 tablet 0     No current facility-administered medications for this visit. Assessment:    1. Chronic obstructive pulmonary disease, unspecified COPD type ([address]    2. Chronic bilateral thoracic back pain    3. Cigarette nicotine dependence without complication    4. Severe recurrent major depressive disorder with psychotic features ([facility]    5. Mixed hyperlipidemia    6. Dementia without behavioral disturbance, unspecified dementia type ([facility]        Plan:    1. Chronic obstructive pulmonary disease, unspecified COPD type ([address]  Stable. Continue current medications. Given rx for full face mask for his oxygen. - CBC Auto Differential  - Comprehensive Metabolic Panel    2. Chronic bilateral thoracic back pain  Stable. Continue current medications. 3. Cigarette nicotine dependence without complication  No desire to quit smoking. Encouraged tobacco cessation nevertheless. 4. Severe recurrent major depressive disorder with psychotic features ([facility]  Stable. Continue current medications. 5. Mixed hyperlipidemia  The 10-year ASCVD risk score (Teresa Watson, et al., 2013) is: 4.3%    Values used to calculate the score:      Age: 48 years      Sex: Male      Is Non- : No      Diabetic: No      Tobacco smoker: Yes      Systolic Blood Pressure: 95 mmHg      Is BP treated: No      HDL Cholesterol: 42 mg/dL      Total Cholesterol: 184 mg/dL    Dementia is stable. Return in 6 months (on 11/17/2022), or if symptoms worsen or fail to improve, for COPD, Low Back Pain, Mood disorder.

## 2022-05-17 NOTE — TELEPHONE ENCOUNTER
Refill Request     CONFIRM preferrred pharmacy with the patient. If Mail Order Rx - Pend for 90 day refill. Last Seen: Last Seen Department: 5/17/2022  Last Seen by PCP: 5/17/2022    Last Written: 5/17/2022, needs sent to [facility] not mail order. Next Appointment:   No future appointments. Requested Prescriptions     Pending Prescriptions Disp Refills    Misc. Devices KIT 1 kit 5     Sig: Full face mask. Use as directed with oxygen for COPD.  Phone: [phone]

## 2022-05-20 ENCOUNTER — TELEPHONE (OUTPATIENT)
Dept: FAMILY MEDICINE CLINIC | Age: 47
End: 2022-05-20

## 2022-05-20 DIAGNOSIS — J44.9 CHRONIC OBSTRUCTIVE PULMONARY DISEASE, UNSPECIFIED COPD TYPE (HCC): Primary | ICD-10-CM

## 2022-05-20 NOTE — TELEPHONE ENCOUNTER
----- Message from Anand Banda sent at 5/18/2022 12:46 PM EDT -----  Subject: Message to Provider    QUESTIONS  Information for Provider? Patient will need a new nebulizer machine as his   is no good.  was looking into what medications patients insurance would   pay for. Please call to discuss. ---------------------------------------------------------------------------  --------------  Shyann OCHOA  What is the best way for the office to contact you? OK to leave message on   voicemail  Preferred Call Back Phone Number? [phone]  ---------------------------------------------------------------------------  --------------  SCRIPT ANSWERS  Relationship to Patient?  Self

## 2022-05-23 ENCOUNTER — TELEPHONE (OUTPATIENT)
Dept: FAMILY MEDICINE CLINIC | Age: 47
End: 2022-05-23

## 2022-05-23 DIAGNOSIS — J44.9 CHRONIC OBSTRUCTIVE PULMONARY DISEASE, UNSPECIFIED COPD TYPE (HCC): ICD-10-CM

## 2022-05-23 RX ORDER — NEBULIZER ACCESSORIES
1 KIT MISCELLANEOUS DAILY PRN
Qty: 1 KIT | Refills: 0 | Status: SHIPPED | OUTPATIENT
Start: 2022-05-23 | End: 2022-05-23 | Stop reason: SDUPTHER

## 2022-05-23 RX ORDER — NEBULIZER ACCESSORIES
1 KIT MISCELLANEOUS DAILY PRN
Qty: 1 KIT | Refills: 0 | Status: SHIPPED | OUTPATIENT
Start: 2022-05-23 | End: 2022-05-24 | Stop reason: SDUPTHER

## 2022-05-23 NOTE — TELEPHONE ENCOUNTER
Touched base with patient, he stated that Dr. Lebron Floyd was looking into getting an alternative solution for him that is covered by his insurance. Pt states that under his insurance the nebulizer machine and solution are not covered, pt would need a script for a nebulizer machine, solution, tubing, face mask and all other components. His next appointment with Pulmonary is not until August and he can not wait that long. Pt mentioned a walker with a seat as well. He said he discussed this with Dr. Lebron Floyd.

## 2022-05-23 NOTE — TELEPHONE ENCOUNTER
New nebulizer machine prescription sent in. Hopefully it will be covered. If not he should discuss with his lung doctor who is in the specialty that usually prescribes these supplies.

## 2022-05-23 NOTE — TELEPHONE ENCOUNTER
Pt called stating [facility] in [place] does not fill Neublizer Solution or pieces either. [facility]  Where is Oxygen order was sent.      He states \"This is a 2 year ordeal with getting the right nebulizer for me\"

## 2022-05-23 NOTE — TELEPHONE ENCOUNTER
Carina with [employer] called stating they received an order for Neb/tubing/mouth piece supplies and they do not fill these types of scripts. Needs to be sent to another supplier of Patients choice.  Please advise

## 2022-05-23 NOTE — TELEPHONE ENCOUNTER
Spoke with pt, told him Dr. Aguilar George sent in a script for neb items- and that it may not be covered. Follow up with lung doctor. Zohreh on the note from 4/26/2022 its says Confirmed that he received DME of walker and BSC. Pt has stated that he only confirmed oxygen usage.      How can we get this order to a DME- Pt is still needing a walker/ w seat

## 2022-05-23 NOTE — TELEPHONE ENCOUNTER
Incoming call from patient, he stated he called [phone] [employer] supplies were denied. Medicare Part B only gets billed for this    Pt requesting script get sent to [place] [id number] machine and supplies.

## 2022-05-23 NOTE — TELEPHONE ENCOUNTER
----- Message from Alberttrish Christopher sent at 5/18/2022 12:46 PM EDT -----  Subject: Message to Provider    QUESTIONS  Information for Provider? Patient will need a new nebulizer machine as his   is no good.  was looking into what medications patients insurance would   pay for. Please call to discuss. ---------------------------------------------------------------------------  --------------  Deborah Slot INFO  What is the best way for the office to contact you? OK to leave message on   voicemail  Preferred Call Back Phone Number? [phone]  ---------------------------------------------------------------------------  --------------  SCRIPT ANSWERS  Relationship to Patient?  Self

## 2022-05-23 NOTE — TELEPHONE ENCOUNTER
----- Message from Heath Clay sent at 5/18/2022 12:46 PM EDT -----  Subject: Message to Provider    QUESTIONS  Information for Provider? Patient will need a new nebulizer machine as his   is no good.  was looking into what medications patients insurance would   pay for. Please call to discuss. ---------------------------------------------------------------------------  --------------  Oswald OCHOA  What is the best way for the office to contact you? OK to leave message on   voicemail  Preferred Call Back Phone Number? [phone]  ---------------------------------------------------------------------------  --------------  SCRIPT ANSWERS  Relationship to Patient?  Self

## 2022-05-24 RX ORDER — NEBULIZER ACCESSORIES
1 KIT MISCELLANEOUS DAILY PRN
Qty: 1 KIT | Refills: 0 | Status: SHIPPED | OUTPATIENT
Start: 2022-05-24

## 2022-05-24 NOTE — TELEPHONE ENCOUNTER
Thank you, I will sent script to [facility]-   Could we also get a script for his Rollator as described in the Notes from Goldy Adrian RN on 4/26/2022 and send this to [facility] in [place] as they supply Rollators, not [facility]       This message is being handled by - GABINO    Please contact contact for questions or information.

## 2022-05-25 ENCOUNTER — TELEPHONE (OUTPATIENT)
Dept: FAMILY MEDICINE CLINIC | Age: 47
End: 2022-05-25

## 2022-05-25 RX ORDER — IPRATROPIUM BROMIDE AND ALBUTEROL SULFATE 2.5; .5 MG/3ML; MG/3ML
1 SOLUTION RESPIRATORY (INHALATION) EVERY 4 HOURS
Qty: 180 EACH | Refills: 2 | Status: SHIPPED | OUTPATIENT
Start: 2022-05-25

## 2022-05-25 NOTE — TELEPHONE ENCOUNTER
Received Script from Joanne Saenz, will fax to [facility] now. Thank you. Update: I have notified Patient that [facility] will be receiving a prescription today for duoNebs solution with refills.

## 2022-05-25 NOTE — TELEPHONE ENCOUNTER
Faxed script to [employer] for [employer] and supplies    Called patient to let him know of these details, Pt stated he was contatced by [facility] and the delivery of the Nebulizer/ supplies will be within 3 business days     I asked him to wait to call [facility], because they should reach out to him, For a date on delivery. I told pt I will call him on Friday to touch base again.

## 2022-05-25 NOTE — TELEPHONE ENCOUNTER
Anyone able to write a prescription for nebulizer solution with refills since Dr. Ash Rivera is unavailable today? Pt called in stating that he needed to talk to me. Riesa Shone transferred the call to me. Pt told me he received his nebulizer machine [employer] this morning with all supplies, but not the Nebulizer solution, he called [facility] to see if there was a mistake. A reprsenstative at [facility] told our pt that a seperate script has to be sent in order to get the solution for the machine. Pt still needs a script written for nebulizer solution with refills.

## 2022-05-31 ENCOUNTER — TELEPHONE (OUTPATIENT)
Dept: FAMILY MEDICINE CLINIC | Age: 47
End: 2022-05-31

## 2022-05-31 NOTE — TELEPHONE ENCOUNTER
Lvm to Gab to have someone call this office back and ask for me- in regards to the pt's Rollator walker. If Im not unavailable to please take a message, route to me.

## 2022-05-31 NOTE — TELEPHONE ENCOUNTER
Reached out to patient, to let him know he would need to contact his Pulmonologist to do a 6 minute walking test. Pt stated without his oxygen, his percentage is 50% with movement. I told him I would reach out to his office. I send what is available after June 4th 2022, this is patient preference. I still have not heard back from [address].

## 2022-05-31 NOTE — TELEPHONE ENCOUNTER
Pt called and said medicare will cover it. He just has to be put on a waiting list. Pt asked if he could get orders for his oxygen so he can be placed on the waiting list for the concentration for his oxygen please advise.

## 2022-05-31 NOTE — TELEPHONE ENCOUNTER
Called and spoke with patient this morning, I wanted to check up on him to see if he received his walker yet, pt stated \" No\"  I told him [facility] and see how the process of the walker is doing. I asked him if the Duonebs are working out okay, Pt stated \" yes\"     Pt is almost out of oxygen when he goes out for errors. He wants a portable oxygen tank - Called [employer] Portable Oxygen Concentator with Carry Bag and strap    He was worried that has brand would not be covered by his insurance because it is so pricey. I told him If this is not covered would he be okay with any other brand portable oxygen tank, pt stated \"yes\". Would this be billed under Medicare Part B ?

## 2022-05-31 NOTE — TELEPHONE ENCOUNTER
We had discussed how it is usually the lung doctor who places the order for the oxygen and the oxygen concentrator. I would recommend seeing his lung doctor Dr. Francisco Chan. You usually have to do a 6-minute walking oxygen test to confirm that your oxygen level drops low and that oxygen is needed.

## 2022-06-01 ENCOUNTER — CARE COORDINATION (OUTPATIENT)
Dept: CARE COORDINATION | Age: 47
End: 2022-06-01

## 2022-06-01 NOTE — CARE COORDINATION
Ambulatory Care Coordination Note  6/1/2022  CM Risk Score: 5  Charlson 10 Year Mortality Risk Score: 10%     ACC: Eilleen Boast, RN    Summary Note: AC completed outreach to patient who agreed to follow up calls with ACM. ACM informed patient that [employer] was called and ACM awaiting return call regarding Rollator walker. Patient said he does get [facility] oxygen supplies. Patient is wanting to get portable oxygen concentrator through [facility]. ACM informed patient that he would need new 6 minute walk test. Patient said he would call to schedule an appt. Patient said he has increased oxygen requirements on oxygen concentrator to 4L/O2 d/t desaturating on 2L/O2. ACM said with this increase would need an updated walk test. Patient verbalized understanding. Patient ended call d/t having to go pick someone up from work. ACM said once walk test completed order and test would be faxed to [employer] for Portable oxgyen concentrator. Patient had no other questions or concerns at this time. Plan:    F/U 2.5 weeks  Walk test scheduled  SDOH, [facility]    Ambulatory Care Coordination Assessment    Care Coordination Protocol  Referral from Primary Care Provider: No  Week 1 - Initial Assessment     Do you have all of your prescriptions and are they filled?: Yes  Barriers to medication adherence: None  Are you able to afford your medications?: Yes  How often do you have trouble taking your medications the way you have been told to take them?: I always take them as prescribed.      Do you have Home O2 Therapy?: Yes   Oxygen Regimen: Continuous Flow - Enter rate/FIO2: 4   Method of Delivery: Nasal Cannula   CPAP Use: None      Is patient able to live independently?: Yes     Current Housing: Private Residence        Per the Fall Risk Screening, did the patient have 2 or more falls or 1 fall with injury in the past year?: No        Are you experiencing loss of meaning?: No  Are you experiencing loss of hope and peace?: No     Suggested Interventions and Community Resources   Zone Management Tools: In Process         Set up/Review an Education Plan, Set up/Review Goals              Prior to Admission medications    Medication Sig Start Date End Date Taking? Authorizing Provider   ipratropium-albuterol (DUONEB) 0.5-2.5 (3) MG/3ML SOLN nebulizer solution Take 3 mLs by nebulization every 4 hours 5/25/22   MIGUEL Galvez   Respiratory Therapy Supplies (NEBULIZER/TUBING/MOUTHPIECE) KIT 1 kit by Does not apply route daily as needed (use as directed.) 5/24/22   Eric Alonso DO   Misc. Devices KIT Rollator. Use as directed. 5/24/22   Eric Alonso DO   Misc. Devices KIT Full face mask. Use as directed with oxygen for COPD.  Phone: [phone] 5/17/22   Eric Alonso DO   fluticasone (FLONASE) 50 MCG/ACT nasal spray 1 spray by Each Nostril route daily 5/11/22   LING Olson CNP   gabapentin (NEURONTIN) 600 MG tablet TAKE 2 TABLETS BY MOUTH THREE TIMES A DAY AS NEEDED FOR BACK PAIN 4/25/22 7/24/22  Eric Alonso DO   budesonide-formoterol (SYMBICORT) 160-4.5 MCG/ACT AERO INHALE 2 PUFFS TWICE A DAY 12/27/21   Lexus Patel MD   albuterol sulfate  (90 Base) MCG/ACT inhaler INHALE 2 PUFFS EVERY 6 HOURS AS NEEDED WHEEZING 9/13/21   Eric Alonso DO   tiotropium (SPIRIVA HANDIHALER) 18 MCG inhalation capsule Inhale 1 capsule into the lungs daily 6/3/21   Lexus Patel MD   ibuprofen (ADVIL;MOTRIN) 800 MG tablet TAKE 1 TABLET BY MOUTH EVERY 8 HOURS AS NEEDED PAIN WITH FOOD 12/26/19   LING Kebede CNP   Respiratory Therapy Supplies (NEBULIZER/TUBING/MOUTHPIECE) KIT 1 kit by Does not apply route daily as needed (Patient is needing new equipment) 12/20/19   Cari Castillo MD   risperiDONE (RISPERDAL) 3 MG tablet  11/26/19   Historical Provider, MD   aspirin 325 MG tablet Take 325 mg by mouth daily    Historical Provider, MD   traZODone (DESYREL) 100 MG tablet Take 0.5-1 tablets by mouth nightly as needed for Sleep 5/16/19 Eric Alonso, DO   FLUoxetine (PROZAC) 40 MG capsule Take 1 capsule by mouth daily 12/27/18   Eric Alonso DO   buprenorphine (SUBUTEX) 2 MG SUBL SL tablet Place 16 mg under the tongue daily.      Historical Provider, MD   Nebulizer MISC by Does not apply route daily    Historical Provider, MD   topiramate (TOPAMAX) 50 MG tablet Take 50 mg by mouth daily     Historical Provider, MD   donepezil (ARICEPT) 5 MG tablet Take 1 tablet by mouth nightly 1/1/18   LING Borrego - CNP       Future Appointments   Date Time Provider Varsha Solitario   6/28/2022 11:00 AM Abel Rodrigues MD [place] [facility]   8/4/2022  3:20 PM Lexus Patel MD [facility]     ,   COPD Assessment    Does the patient understand envrionmental exposure?: Yes  Is the patient able to verbalize Rescue vs. Long Acting medications?: Yes  Does the patient have a nebulizer?: Yes  Does the patient use a space with inhaled medications?: No     No patient-reported symptoms         Symptoms:         and   General Assessment    Do you have any symptoms that are causing concern?: No

## 2022-06-01 NOTE — CARE COORDINATION
[facility] to follow up on [facility]ME needs and requirements. [employer] unable to answer and voicemail was left to return call. ACM called but patient did not answer. ACM left voice message with call back number provided. ACM will follow up at a later date.

## 2022-06-01 NOTE — TELEPHONE ENCOUNTER
I will go ahead and outreach to patient for care coordination as there seems to be some items I can help with. I will outreach to [facility] today [place] to follow up on that need and make sure F/U appt is established with Dr. Petr Dinh for walk test. Thank you.

## 2022-06-01 NOTE — TELEPHONE ENCOUNTER
Outreach was attempted and voicemail left for patient to return ACM call. ACM left voicemail for [employer] to return call. Thanks.

## 2022-06-02 ENCOUNTER — TELEPHONE (OUTPATIENT)
Dept: PULMONOLOGY | Age: 47
End: 2022-06-02

## 2022-06-02 ENCOUNTER — CARE COORDINATION (OUTPATIENT)
Dept: CARE COORDINATION | Age: 47
End: 2022-06-02

## 2022-06-02 DIAGNOSIS — J41.0 SIMPLE CHRONIC BRONCHITIS (HCC): Primary | ICD-10-CM

## 2022-06-02 NOTE — CARE COORDINATION
Ambulatory Care Coordination Note  6/2/2022  CM Risk Score: 5  Charlson 10 Year Mortality Risk Score: 10%     ACC: Hilario Monday, RN    Summary Note: Patient called AC this AM and expressed concern he owed [address] in [place] 150 dollars. ACM said she did not think she would be able to help with this bill but could look into copayment's of medication to attempt to lower costs. Patient agreed. Patient declined the use of a mail order pharmacy as he said that medications have been stolen before. Patient did not have Part D medicare coverage in front of him at this time. Patient said he makes [phone] a month and makes to much for the extra help program through medicare. Patient also expressed interest in obtaining a shower chair to help aide in decreasing falls. Patient said he will skip a shower at the end of the day as patient is to tired to stand for a shower. ACm said she would work on this as well. Patient had no other concerns at this time. ACM called Zafar Peña who explained patients copayments for medications are 3.95 cents. Grant Gama said that patient has a balance of 149.59 and [facility] Natan would be unable to fill as it would put patient over 150 dollars balance that is allowed to accrue. Grant Gama said that if patient was placed on a payment plan then medications could be filled again. AC will discuss this with next outreach call. Plan:    F/U call 1 week  DME order for shower chair  Relay message from Pharmacy         Lab Results     None          Care Coordination Interventions    Referral from Primary Care Provider: No  Suggested Interventions and Community Resources  Zone Management Tools: In Process (Comment: COPD zone management)  Other Services or Interventions: Patient expressed wanting a shower chair         Goals Addressed                 This Visit's Progress     Medication Management        I will take my medication as directed.   I will notify my provider of any problems with medications, like adverse effects or side effects. I will notify my provider/Care Coordinator if I am unable to afford my medications. I will notify my provider for advice before I stop taking any of my medication. Barriers: financial  Plan for overcoming my barriers: ACM will look into different programs to help with cost of medications  Confidence: 8/10  Anticipated Goal Completion Date: 7/2/22            Prior to Admission medications    Medication Sig Start Date End Date Taking? Authorizing Provider   ipratropium-albuterol (DUONEB) 0.5-2.5 (3) MG/3ML SOLN nebulizer solution Take 3 mLs by nebulization every 4 hours 5/25/22   MIGUEL Galvez   Respiratory Therapy Supplies (NEBULIZER/TUBING/MOUTHPIECE) KIT 1 kit by Does not apply route daily as needed (use as directed.) 5/24/22   Eric Alonso, DO   Misc. Devices KIT Rollator. Use as directed. 5/24/22   Eric Alonso, DO   Misc. Devices KIT Full face mask. Use as directed with oxygen for COPD.  Phone: [phone] 5/17/22   Eric Alonso, DO   fluticasone (FLONASE) 50 MCG/ACT nasal spray 1 spray by Each Nostril route daily 5/11/22   LING Olson CNP   gabapentin (NEURONTIN) 600 MG tablet TAKE 2 TABLETS BY MOUTH THREE TIMES A DAY AS NEEDED FOR BACK PAIN 4/25/22 7/24/22  Eric Alonso, DO   budesonide-formoterol (SYMBICORT) 160-4.5 MCG/ACT AERO INHALE 2 PUFFS TWICE A DAY 12/27/21   Lexus Patel MD   albuterol sulfate  (90 Base) MCG/ACT inhaler INHALE 2 PUFFS EVERY 6 HOURS AS NEEDED WHEEZING 9/13/21   Eric Alonso, DO   tiotropium (SPIRIVA HANDIHALER) 18 MCG inhalation capsule Inhale 1 capsule into the lungs daily 6/3/21   Lexus Patel MD   ibuprofen (ADVIL;MOTRIN) 800 MG tablet TAKE 1 TABLET BY MOUTH EVERY 8 HOURS AS NEEDED PAIN WITH FOOD 12/26/19   LING Kebede CNP   Respiratory Therapy Supplies (NEBULIZER/TUBING/MOUTHPIECE) KIT 1 kit by Does not apply route daily as needed (Patient is needing new equipment) 12/20/19   [facility] Araceli Williamson MD   risperiDONE (RISPERDAL) 3 MG tablet  11/26/19   Historical Provider, MD   aspirin 325 MG tablet Take 325 mg by mouth daily    Historical Provider, MD   traZODone (DESYREL) 100 MG tablet Take 0.5-1 tablets by mouth nightly as needed for Sleep 5/16/19   Eric Alonso DO   FLUoxetine (PROZAC) 40 MG capsule Take 1 capsule by mouth daily 12/27/18   Eric Alonso DO   buprenorphine (SUBUTEX) 2 MG SUBL SL tablet Place 16 mg under the tongue daily.      Historical Provider, MD   Nebulizer MISC by Does not apply route daily    Historical Provider, MD   topiramate (TOPAMAX) 50 MG tablet Take 50 mg by mouth daily     Historical Provider, MD   donepezil (ARICEPT) 5 MG tablet Take 1 tablet by mouth nightly 1/1/18   LING Walker - [facility]       Future Appointments   Date Time Provider Varsha Solitario   6/28/2022 11:00 AM MD Reshma Mejia   8/4/2022  3:20 PM Lucila Avila MD AND [facility]     ,   COPD Assessment    Does the patient understand envrionmental exposure?: Yes  Is the patient able to verbalize Rescue vs. Long Acting medications?: Yes  Does the patient have a nebulizer?: Yes  Does the patient use a space with inhaled medications?: No            Symptoms:         and   General Assessment    Do you have any symptoms that are causing concern?: No

## 2022-06-02 NOTE — TELEPHONE ENCOUNTER
Pt called and said he needs to requalify for O2 and needs a 6MW. Do you also want to do a PFT with the 6MW?

## 2022-06-03 ENCOUNTER — TELEMEDICINE (OUTPATIENT)
Dept: FAMILY MEDICINE CLINIC | Age: 47
End: 2022-06-03
Payer: MEDICARE

## 2022-06-03 DIAGNOSIS — M54.6 CHRONIC BILATERAL THORACIC BACK PAIN: Primary | ICD-10-CM

## 2022-06-03 DIAGNOSIS — F03.90 DEMENTIA WITHOUT BEHAVIORAL DISTURBANCE, UNSPECIFIED DEMENTIA TYPE: ICD-10-CM

## 2022-06-03 DIAGNOSIS — G89.29 CHRONIC BILATERAL THORACIC BACK PAIN: Primary | ICD-10-CM

## 2022-06-03 PROCEDURE — 99213 OFFICE O/P EST LOW 20 MIN: CPT | Performed by: FAMILY MEDICINE

## 2022-06-03 PROCEDURE — G8427 DOCREV CUR MEDS BY ELIG CLIN: HCPCS | Performed by: FAMILY MEDICINE

## 2022-06-03 SDOH — ECONOMIC STABILITY: TRANSPORTATION INSECURITY
IN THE PAST 12 MONTHS, HAS LACK OF TRANSPORTATION KEPT YOU FROM MEETINGS, WORK, OR FROM GETTING THINGS NEEDED FOR DAILY LIVING?: YES

## 2022-06-03 SDOH — ECONOMIC STABILITY: FOOD INSECURITY: WITHIN THE PAST 12 MONTHS, THE FOOD YOU BOUGHT JUST DIDN'T LAST AND YOU DIDN'T HAVE MONEY TO GET MORE.: NEVER TRUE

## 2022-06-03 SDOH — ECONOMIC STABILITY: TRANSPORTATION INSECURITY
IN THE PAST 12 MONTHS, HAS THE LACK OF TRANSPORTATION KEPT YOU FROM MEDICAL APPOINTMENTS OR FROM GETTING MEDICATIONS?: YES

## 2022-06-03 SDOH — ECONOMIC STABILITY: FOOD INSECURITY: WITHIN THE PAST 12 MONTHS, YOU WORRIED THAT YOUR FOOD WOULD RUN OUT BEFORE YOU GOT MONEY TO BUY MORE.: NEVER TRUE

## 2022-06-03 ASSESSMENT — PATIENT HEALTH QUESTIONNAIRE - PHQ9
7. TROUBLE CONCENTRATING ON THINGS, SUCH AS READING THE NEWSPAPER OR WATCHING TELEVISION: 0
SUM OF ALL RESPONSES TO PHQ QUESTIONS 1-9: 0
5. POOR APPETITE OR OVEREATING: 0
1. LITTLE INTEREST OR PLEASURE IN DOING THINGS: 0
3. TROUBLE FALLING OR STAYING ASLEEP: 1
SUM OF ALL RESPONSES TO PHQ QUESTIONS 1-9: 0
1. LITTLE INTEREST OR PLEASURE IN DOING THINGS: 0
4. FEELING TIRED OR HAVING LITTLE ENERGY: 1
SUM OF ALL RESPONSES TO PHQ QUESTIONS 1-9: 2
6. FEELING BAD ABOUT YOURSELF - OR THAT YOU ARE A FAILURE OR HAVE LET YOURSELF OR YOUR FAMILY DOWN: 0
SUM OF ALL RESPONSES TO PHQ9 QUESTIONS 1 & 2: 0
8. MOVING OR SPEAKING SO SLOWLY THAT OTHER PEOPLE COULD HAVE NOTICED. OR THE OPPOSITE, BEING SO FIGETY OR RESTLESS THAT YOU HAVE BEEN MOVING AROUND A LOT MORE THAN USUAL: 0
10. IF YOU CHECKED OFF ANY PROBLEMS, HOW DIFFICULT HAVE THESE PROBLEMS MADE IT FOR YOU TO DO YOUR WORK, TAKE CARE OF THINGS AT HOME, OR GET ALONG WITH OTHER PEOPLE: 0
2. FEELING DOWN, DEPRESSED OR HOPELESS: 0
SUM OF ALL RESPONSES TO PHQ QUESTIONS 1-9: 0
SUM OF ALL RESPONSES TO PHQ QUESTIONS 1-9: 2
SUM OF ALL RESPONSES TO PHQ QUESTIONS 1-9: 2
SUM OF ALL RESPONSES TO PHQ9 QUESTIONS 1 & 2: 0
SUM OF ALL RESPONSES TO PHQ QUESTIONS 1-9: 0
SUM OF ALL RESPONSES TO PHQ QUESTIONS 1-9: 2
9. THOUGHTS THAT YOU WOULD BE BETTER OFF DEAD, OR OF HURTING YOURSELF: 0
2. FEELING DOWN, DEPRESSED OR HOPELESS: 0

## 2022-06-03 ASSESSMENT — ENCOUNTER SYMPTOMS: BACK PAIN: 1

## 2022-06-03 ASSESSMENT — SOCIAL DETERMINANTS OF HEALTH (SDOH): HOW HARD IS IT FOR YOU TO PAY FOR THE VERY BASICS LIKE FOOD, HOUSING, MEDICAL CARE, AND HEATING?: SOMEWHAT HARD

## 2022-06-03 NOTE — TELEPHONE ENCOUNTER
Pt has his virtual with dr. Aguilar George today at 2pm,   Unfortunately [facility] Shower Chair.    I had to discover who did, so today's not and shower chair prescription got sent to [facility] in [place], [address] [place] [address] Drive   Fax # [phone]

## 2022-06-03 NOTE — PROGRESS NOTES
Germania Sheets is a 48 y.o. male    Chief Complaint   Patient presents with    Other     Shower chair        HPI:    HPI    This is a video visit. Consent has been obtained. The patient is at home. Chronic back pain. This is a chronic problem. The current episode started more than 1 year ago. The problem occurs constantly. The problem is unchanged. The pain is present in the lumbar spine. Associated symptoms include tingling. Risk factors include sedentary lifestyle. Treatments tried: gabapentin. The treatment provided significant relief. Germania Sheets was evaluated today and a DME order was entered for a shower chair because he requires this to successfully complete daily living tasks of bathing and personal cares. A shower chair is necessary due to the patient's unsteady gait, dementia, upper body weakness, chronic bilateral thoracic back pain, difficulty ambulating, and the need to sit for a short time in between bathing. These tasks cannot be completed without a shower chair. The need for this equipment was discussed with the patient and he understands and is in agreement. ROS:    Review of Systems   Musculoskeletal: Positive for back pain. There were no vitals taken for this visit. Physical Exam:    Physical Exam  Constitutional:       General: He is not in acute distress. Appearance: Normal appearance. He is normal weight. He is not ill-appearing or toxic-appearing. HENT:      Head: Normocephalic. Neurological:      Mental Status: He is alert. Psychiatric:         Mood and Affect: Mood normal.         Behavior: Behavior normal.         Thought Content: Thought content normal.         Current Outpatient Medications   Medication Sig Dispense Refill    Misc. Devices KIT Shower chair. Use as directed.  1 kit 0    ipratropium-albuterol (DUONEB) 0.5-2.5 (3) MG/3ML SOLN nebulizer solution Take 3 mLs by nebulization every 4 hours 180 each 2    Respiratory Therapy Supplies (NEBULIZER/TUBING/MOUTHPIECE) KIT 1 kit by Does not apply route daily as needed (use as directed.) 1 kit 0    Misc. Devices KIT Rollator. Use as directed. 1 kit 0    Misc. Devices KIT Full face mask. Use as directed with oxygen for COPD. Phone: [phone] 1 kit 5    fluticasone (FLONASE) 50 MCG/ACT nasal spray 1 spray by Each Nostril route daily 2 each 3    gabapentin (NEURONTIN) 600 MG tablet TAKE 2 TABLETS BY MOUTH THREE TIMES A DAY AS NEEDED FOR BACK PAIN 180 tablet 2    budesonide-formoterol (SYMBICORT) 160-4.5 MCG/ACT AERO INHALE 2 PUFFS TWICE A DAY 1 each 5    albuterol sulfate  (90 Base) MCG/ACT inhaler INHALE 2 PUFFS EVERY 6 HOURS AS NEEDED WHEEZING 8.5 g 11    tiotropium (SPIRIVA HANDIHALER) 18 MCG inhalation capsule Inhale 1 capsule into the lungs daily 90 capsule 1    ibuprofen (ADVIL;MOTRIN) 800 MG tablet TAKE 1 TABLET BY MOUTH EVERY 8 HOURS AS NEEDED PAIN WITH FOOD 60 tablet 0    Respiratory Therapy Supplies (NEBULIZER/TUBING/MOUTHPIECE) KIT 1 kit by Does not apply route daily as needed (Patient is needing new equipment) 1 kit 0    risperiDONE (RISPERDAL) 3 MG tablet       aspirin 325 MG tablet Take 325 mg by mouth daily      traZODone (DESYREL) 100 MG tablet Take 0.5-1 tablets by mouth nightly as needed for Sleep 90 tablet 1    FLUoxetine (PROZAC) 40 MG capsule Take 1 capsule by mouth daily 30 capsule 5    buprenorphine (SUBUTEX) 2 MG SUBL SL tablet Place 16 mg under the tongue daily.  Nebulizer MISC by Does not apply route daily      topiramate (TOPAMAX) 50 MG tablet Take 50 mg by mouth daily       donepezil (ARICEPT) 5 MG tablet Take 1 tablet by mouth nightly 12 tablet 0     No current facility-administered medications for this visit. Assessment:    1. Chronic bilateral thoracic back pain    2. Dementia without behavioral disturbance, unspecified dementia type ([facility]        Plan:    1. Chronic bilateral thoracic back pain  Continue gabapentin.   Hopefully his shower chair will get approved by insurance. - Misc. Devices KIT; Shower chair. Use as directed. Dispense: 1 kit; Refill: 0    2. Dementia without behavioral disturbance, unspecified dementia type ([facility].)  Continue current medicines. - Misc. Devices KIT; Shower chair. Use as directed. Dispense: 1 kit; Refill: 0      Patient to return to clinic if symptoms worsen or fail to improve.

## 2022-06-10 ENCOUNTER — TELEPHONE (OUTPATIENT)
Dept: FAMILY MEDICINE CLINIC | Age: 47
End: 2022-06-10

## 2022-06-10 ENCOUNTER — CARE COORDINATION (OUTPATIENT)
Dept: CARE COORDINATION | Age: 47
End: 2022-06-10

## 2022-06-10 NOTE — CARE COORDINATION
Ambulatory Care Coordination Note  6/10/2022  CM Risk Score: 5  Charlson 10 Year Mortality Risk Score: 10%     ACC: Yamilex Haddad RN    Summary Note: [facility] outreached to [facility] who said all documents were received but currently [facility] is behind by 1.5 weeks for insurance verification process d/t increase in orders that have been received. Marnie Alfaro obtained height and weight from [facility] for patient and said someone would be in touch with patient once this is processed. [facility] thanked Marnie Alfaro at this time. [facility] called and updated patient regarding [facility] and shower chair, no further questions at this time. [facility] also informed patient regarding [facility] policy regarding 591 dollar balance. Patient verbalized understanding. Plan:    F/U call 2 weeks      Lab Results     None          Care Coordination Interventions    Referral from Primary Care Provider: No  Suggested Interventions and Community Resources  Zone Management Tools: In Process (Comment: COPD zone management)  Other Services or Interventions: Patient expressed wanting a shower chair; Ho Corley recieved Documentation and will be sending to  6/10/22 CJT         Goals Addressed                    This Visit's Progress      Medication Management   Improving      I will take my medication as directed. I will notify my provider of any problems with medications, like adverse effects or side effects. I will notify my provider/Care Coordinator if I am unable to afford my medications. I will notify my provider for advice before I stop taking any of my medication. Barriers: financial  Plan for overcoming my barriers: [facility] will look into different programs to help with cost of medications  Confidence: 8/10  Anticipated Goal Completion Date: 7/2/22        Patient Stated (pt-stated)   Improving      Patient would like to get a portable oxygen concentrator.     Barriers: lack of motivation and overwhelmed by complexity of regimen  Plan for overcoming my barriers: ACM will encourage patient to obtain a new 6 minute walk test with Pulm to obtain portable oxygen concentrator  Confidence: 8/10  Anticipated Goal Completion Date: 8/1/22              Prior to Admission medications    Medication Sig Start Date End Date Taking? Authorizing Provider   Misc. Devices KIT Shower chair. Use as directed. 6/3/22   Eric Alonso DO   ipratropium-albuterol (DUONEB) 0.5-2.5 (3) MG/3ML SOLN nebulizer solution Take 3 mLs by nebulization every 4 hours 5/25/22   MIGUEL Patten   Respiratory Therapy Supplies (NEBULIZER/TUBING/MOUTHPIECE) KIT 1 kit by Does not apply route daily as needed (use as directed.) 5/24/22   Eric Alonso, DO   [facility] Devices KIT Rollator. Use as directed. 5/24/22   Eric Alonso, DO   Misc. Devices KIT Full face mask. Use as directed with oxygen for COPD.  Phone: [phone] 5/17/22   Eric Alonso, DO   fluticasone (FLONASE) 50 MCG/ACT nasal spray 1 spray by Each Nostril route daily 5/11/22   Lori Tucker APRN - CNP   gabapentin (NEURONTIN) 600 MG tablet TAKE 2 TABLETS BY MOUTH THREE TIMES A DAY AS NEEDED FOR BACK PAIN 4/25/22 7/24/22  Eric Alonso, DO   budesonide-formoterol (SYMBICORT) 160-4.5 MCG/ACT AERO INHALE 2 PUFFS TWICE A DAY 12/27/21   Víctor Nixon MD   albuterol sulfate  (90 Base) MCG/ACT inhaler INHALE 2 PUFFS EVERY 6 HOURS AS NEEDED WHEEZING 9/13/21   Eric Alonso, DO   tiotropium (SPIRIVA HANDIHALER) 18 MCG inhalation capsule Inhale 1 capsule into the lungs daily 6/3/21   Víctor Nixon MD   ibuprofen (ADVIL;MOTRIN) 800 MG tablet TAKE 1 TABLET BY MOUTH EVERY 8 HOURS AS NEEDED PAIN WITH FOOD 12/26/19   LING Byers - CNP   Respiratory Therapy Supplies (NEBULIZER/TUBING/MOUTHPIECE) KIT 1 kit by Does not apply route daily as needed (Patient is needing new equipment) 12/20/19   Margaret Hdez MD   risperiDONE (RISPERDAL) 3 MG tablet  11/26/19   Historical Provider, MD   aspirin 325 MG tablet Take 325 mg by mouth daily    Historical Provider, MD   traZODone (DESYREL) 100 MG tablet Take 0.5-1 tablets by mouth nightly as needed for Sleep 5/16/19   Eric Alonso DO   FLUoxetine (PROZAC) 40 MG capsule Take 1 capsule by mouth daily 12/27/18   Eric Alonso DO   buprenorphine (SUBUTEX) 2 MG SUBL SL tablet Place 16 mg under the tongue daily.      Historical Provider, MD   Nebulizer MISC by Does not apply route daily    Historical Provider, MD   topiramate (TOPAMAX) 50 MG tablet Take 50 mg by mouth daily     Historical Provider, MD   donepezil (ARICEPT) 5 MG tablet Take 1 tablet by mouth nightly 1/1/18   LING Castellanos - CNP       Future Appointments   Date Time Provider Varsha Solitario   6/14/2022 10:30 AM SCHEDULE, [facility] PFT [place] PFT Lowell Wihtman   6/28/2022 11:00 AM MD Chery Banegas   8/4/2022  3:20 PM Magy Fenton MD AND [facility]     ,   COPD Assessment    Does the patient understand envrionmental exposure?: Yes  Is the patient able to verbalize Rescue vs. Long Acting medications?: Yes  Does the patient have a nebulizer?: Yes  Does the patient use a space with inhaled medications?: No     No patient-reported symptoms         Symptoms:         and   General Assessment    Do you have any symptoms that are causing concern?: No

## 2022-06-13 ENCOUNTER — CARE COORDINATION (OUTPATIENT)
Dept: CARE COORDINATION | Age: 47
End: 2022-06-13

## 2022-06-13 NOTE — CARE COORDINATION
[facility] had received an incoming call from patient who said he was not able to get a shower chair through [facility] and was informed he would not qualify for DME. [facility] sent information to NIKI KIRKPATRICK [facility] for investigation as why this was denied through insurance for patient to get a shower chair. [facility] will follow up at a later date.

## 2022-06-14 ENCOUNTER — HOSPITAL ENCOUNTER (OUTPATIENT)
Dept: PULMONOLOGY | Age: 47
Discharge: HOME | End: 2022-06-14
Payer: MEDICARE

## 2022-06-14 VITALS — OXYGEN SATURATION: 96 %

## 2022-06-14 DIAGNOSIS — J41.0 SIMPLE CHRONIC BRONCHITIS (HCC): ICD-10-CM

## 2022-06-14 LAB
DLCO %PRED: 26 %
DLCO PRED: NORMAL
DLCO/VA %PRED: NORMAL
DLCO/VA PRED: NORMAL
DLCO/VA: NORMAL
DLCO: NORMAL
EXPIRATORY TIME-POST: NORMAL
EXPIRATORY TIME: NORMAL
FEF 25-75% %CHNG: NORMAL
FEF 25-75% %PRED-POST: NORMAL
FEF 25-75% %PRED-PRE: NORMAL
FEF 25-75% PRED: NORMAL
FEF 25-75%-POST: NORMAL
FEF 25-75%-PRE: NORMAL
FEV1 %PRED-POST: 17 %
FEV1 %PRED-PRE: 22 %
FEV1 PRED: NORMAL
FEV1-POST: NORMAL
FEV1-PRE: NORMAL
FEV1/FVC %PRED-POST: NORMAL
FEV1/FVC %PRED-PRE: NORMAL
FEV1/FVC PRED: NORMAL
FEV1/FVC-POST: 36 %
FEV1/FVC-PRE: 37 %
FVC %PRED-POST: 37 L
FVC %PRED-PRE: 46 %
FVC PRED: NORMAL
FVC-POST: NORMAL
FVC-PRE: NORMAL
GAW %PRED: NORMAL
GAW PRED: NORMAL
GAW: NORMAL
IC %PRED: NORMAL
IC PRED: NORMAL
IC: NORMAL
MEP: NORMAL
MIP: NORMAL
MVV %PRED-PRE: NORMAL
MVV PRED: NORMAL
MVV-PRE: NORMAL
PEF %PRED-POST: NORMAL
PEF %PRED-PRE: NORMAL
PEF PRED: NORMAL
PEF%CHNG: NORMAL
PEF-POST: NORMAL
PEF-PRE: NORMAL
RAW %PRED: NORMAL
RAW PRED: NORMAL
RAW: NORMAL
RV %PRED: NORMAL
RV PRED: NORMAL
RV: NORMAL
SVC %PRED: NORMAL
SVC PRED: NORMAL
SVC: NORMAL
TLC %PRED: 101 %
TLC PRED: NORMAL
TLC: NORMAL
VA %PRED: NORMAL
VA PRED: NORMAL
VA: NORMAL
VTG %PRED: NORMAL
VTG PRED: NORMAL
VTG: NORMAL

## 2022-06-14 PROCEDURE — 94060 EVALUATION OF WHEEZING: CPT

## 2022-06-14 PROCEDURE — 94618 PULMONARY STRESS TESTING: CPT

## 2022-06-14 PROCEDURE — 6370000000 HC RX 637 (ALT 250 FOR IP): Performed by: INTERNAL MEDICINE

## 2022-06-14 PROCEDURE — 94726 PLETHYSMOGRAPHY LUNG VOLUMES: CPT

## 2022-06-14 PROCEDURE — 94729 DIFFUSING CAPACITY: CPT

## 2022-06-14 RX ORDER — ALBUTEROL SULFATE 90 UG/1
4 AEROSOL, METERED RESPIRATORY (INHALATION) ONCE
Status: COMPLETED | OUTPATIENT
Start: 2022-06-14 | End: 2022-06-14

## 2022-06-14 RX ADMIN — Medication 4 PUFF: at 11:24

## 2022-06-14 ASSESSMENT — PULMONARY FUNCTION TESTS
FEV1/FVC_PRE: 37
FEV1_PERCENT_PREDICTED_POST: 17
FEV1/FVC_POST: 36
FVC_PERCENT_PREDICTED_PRE: 46
FEV1_PERCENT_PREDICTED_PRE: 22
FVC_PERCENT_PREDICTED_POST: 37

## 2022-06-14 NOTE — PROCEDURES
[address] [facility] - Six Minute Walk      Test Performed on:   Room Air____X__   Oxygen at _____ lpm via N/C- continuous Oxygen at _____ lpm via N/C- OCD  Assist Device Used During Test:  None______ Cane________ Walker__X_______    Modified Kvng's Scale  0 Nothing at all 5 Strong    0.5 Extremely Weak 6 Stronger (Hard)    1 Very weak 7 Very Strong   2 Weak (light) 8 Very Very Strong   3 Moderate 9 Extremely Strong   4 Somewhat Strong 10 Maximum All out      Time SpO2 Heart Rate Respiratory Rate Dyspnea-  Modified Kvngs Scale Fatigue- Modified Kvngs Scale Other Symptoms   Baseline                     96% room air @rest 94 18 2 2      1 minute                     91% 98 20 2 2    2 minutes                     92% 103 22 3 3      3 minutes                     89% 104 23 4 4    4 minutes                     92% 104 24 4 4    5 minutes                     92% 103 25 5 5    6 minutes                     92% 101 26 6 6    Recovery x 1 minute                     95% 96 24 4 4    Recovery x 2 Minute                     96% 95 22 3 3     Number of Laps___8______ X 120 feet + _________ additional feet = Total Distance __960___feet   Stopped or paused before 6 minutes? No____X___ Yes ________  Total expected 6 MW distance is _2497____feet. Patient achieved ___44___% of expected distance.    Pre Blood Pressure: 105/75  Post Blood Pressure: 103/63  Other symptoms at the end of exercise: SOB, chest pain/pressure, back and leg pain

## 2022-06-15 NOTE — PROCEDURES
[facility] [place] [address]                               PULMONARY FUNCTION    PATIENT NAME: Zeina Arnold                    :        1975  MED REC NO:   [id number]                          ROOM:  ACCOUNT NO:   [de-identified]                           ADMIT DATE: 2022  PROVIDER:     Joann Mendez MD    DATE OF PROCEDURE:  2022    PFT INTERPRETATION    The patient is a 44-year male who underwent a PFT for simple chronic  bronchitis. Spirometry shows FVC to be 46%, FEV1 to be 22%, FEV1 to FVC  ratio was 47%, WZN08-07% was 12%. The patient did not have any  significant postbronchodilator improvement. The lung volume shows total  capacity was normal.  The patient had significant air trapping and  hyperinflation. The patient also has profound decrease in diffusion  capacity when adjusted for volume. The patient had a PFT done in 2019. At that time, the patient's FVC was 59%, FEV1 was 35%, FEV1 to FVC ratio  was 59% at the time and the patient's GYR32-71% was 17% at that time. The patient's diffusion capacity when adjusted for volume was 55% at  that time. The patient's flow-volume loop was suggestive of obstructive  pattern. On the basis of this PFT, the patient has very severe  obstructive airway disease with profound decrease in diffusion capacity  when adjusted for volume and the patient will qualify for pulmonary  rehab on the basis of this PFT parameters. The patient also underwent a  six minute walk test which shows baseline oxygen saturation of 96% at  room air at rest with a heart rate of 94, respiratory rate of 18,  dyspnea-modified Kvng scale of 3, fatigue-modified Kvng scale of 2. The  patient did not have any exertional hypoxemia on this study. The  patient walked 960 feet. The patient's total expected 6-minute walk  distance was 2496 feet.   The patient only achieved 45% of the expected  distance. The patient on the basis of the 6-minute walk test has no  exertional hypoxemia on suboptimal exercise. Please correlate  clinically.         Ko Ventura MD    D: 06/15/2022 9:17:06       T: 06/15/2022 9:19:36     [email or web address]  Job#: [id number]     Doc#: [id number]    CC:

## 2022-06-15 NOTE — CARE COORDINATION
Ambulatory Care Coordination Note  6/15/2022  CM Risk Score: 5  Charlson 10 Year Mortality Risk Score: 10%     ACC: Jhony San, RN    Summary Note: [facility] completed follow up call with patient. Patient was informed that it was correct Medicare would not pay for a shower chair or bathroom safety devices at this time. [facility] provided patient with [place] [employer] program information [phone] to assist with shower chair. Patient thanked [facility] for the information and will call. Patient was upset because he passed the walk test and did no qualify for portable oxygen concentrator. Patient said this was in a controlled environment so of course he was able to breath better and pass. [facility] said she did no have influence over the concentrator portion. Unfortunately, insurance will provide the guidelines and healthcare professionals have to follow that. Patient verbalized understanding. Plan:    F/U call 1 month      Lab Results     None          Care Coordination Interventions    Referral from Primary Care Provider: No  Suggested Interventions and Community Resources  Zone Management Tools: In Process (Comment: COPD zone management)  Other Services or Interventions: Patient expressed wanting a shower chair; [address] recieved Documentation and will be sending to  6/10/22 CJT         Goals Addressed    None         Prior to Admission medications    Medication Sig Start Date End Date Taking? Authorizing Provider   Misc. Devices KIT Shower chair. Use as directed. 6/3/22   Eric Alonso DO   ipratropium-albuterol (DUONEB) 0.5-2.5 (3) MG/3ML SOLN nebulizer solution Take 3 mLs by nebulization every 4 hours 5/25/22   MIGUEL Gonzales   Respiratory Therapy Supplies (NEBULIZER/TUBING/MOUTHPIECE) KIT 1 kit by Does not apply route daily as needed (use as directed.) 5/24/22   Eric Alonso DO   Misc. Devices KIT Rollator. Use as directed. 5/24/22   Eric Alonso DO   Misc. Devices KIT Full face mask. Use as directed with oxygen for COPD. Phone: [phone] 5/17/22   Eric Alonso, DO   fluticasone (FLONASE) 50 MCG/ACT nasal spray 1 spray by Each Nostril route daily 5/11/22   LING Love CNP   gabapentin (NEURONTIN) 600 MG tablet TAKE 2 TABLETS BY MOUTH THREE TIMES A DAY AS NEEDED FOR BACK PAIN 4/25/22 7/24/22  Eric Alonso DO   budesonide-formoterol (SYMBICORT) 160-4.5 MCG/ACT AERO INHALE 2 PUFFS TWICE A DAY 12/27/21   Ligia Pennington MD   albuterol sulfate  (90 Base) MCG/ACT inhaler INHALE 2 PUFFS EVERY 6 HOURS AS NEEDED WHEEZING 9/13/21   Eric Alonso DO   tiotropium (SPIRIVA HANDIHALER) 18 MCG inhalation capsule Inhale 1 capsule into the lungs daily 6/3/21   Ligia Pennington MD   ibuprofen (ADVIL;MOTRIN) 800 MG tablet TAKE 1 TABLET BY MOUTH EVERY 8 HOURS AS NEEDED PAIN WITH FOOD 12/26/19   LING Ovalle CNP   Respiratory Therapy Supplies (NEBULIZER/TUBING/MOUTHPIECE) KIT 1 kit by Does not apply route daily as needed (Patient is needing new equipment) 12/20/19   Niles Mensah MD   risperiDONE (RISPERDAL) 3 MG tablet  11/26/19   Historical Provider, MD   aspirin 325 MG tablet Take 325 mg by mouth daily    Historical Provider, MD   traZODone (DESYREL) 100 MG tablet Take 0.5-1 tablets by mouth nightly as needed for Sleep 5/16/19   Eric Alonso DO   FLUoxetine (PROZAC) 40 MG capsule Take 1 capsule by mouth daily 12/27/18   Eric Alonso DO   buprenorphine (SUBUTEX) 2 MG SUBL SL tablet Place 16 mg under the tongue daily.      Historical Provider, MD   Nebulizer MISC by Does not apply route daily    Historical Provider, MD   topiramate (TOPAMAX) 50 MG tablet Take 50 mg by mouth daily     Historical Provider, MD   donepezil (ARICEPT) 5 MG tablet Take 1 tablet by mouth nightly 1/1/18   Valeriy Abrams APRN - CNP       Future Appointments   Date Time Provider Varsha Solitario   6/28/2022 11:00 AM MD Becka Serrano [facility]   8/4/2022  3:20 PM Ligia Pennington MD AND PULM [facility]     ,   COPD Assessment    Does the patient understand envrionmental exposure?: Yes  Is the patient able to verbalize Rescue vs. Long Acting medications?: Yes  Does the patient have a nebulizer?: Yes  Does the patient use a space with inhaled medications?: No     No patient-reported symptoms         Symptoms:         and   General Assessment    Do you have any symptoms that are causing concern?: No

## 2022-06-24 ENCOUNTER — CARE COORDINATION (OUTPATIENT)
Dept: CARE COORDINATION | Age: 47
End: 2022-06-24

## 2022-06-24 NOTE — CARE COORDINATION
Ambulatory Care Coordination Note  6/24/2022  CM Risk Score: 5  Charlson 10 Year Mortality Risk Score: 10%     ACC: Hilario Monday, RN    Summary Note: Patient had called ACM with concerns of [employer] charges of 65 dollars that have accrued. Patient does not have that kind of money with prices increasing. ACM called [employer] who said patient would be able to be placed on a payment plan or could qualify for financial assistance through [employer] if payments were not able to be made. ACM informed patient of this. Patient verbalized understanding and no other concerns at this time. Plan:    F/U call 2 weeks  F/U medication cost  F/U [employer] Billing    Lab Results     None          Care Coordination Interventions    Referral from Primary Care Provider: No  Suggested Interventions and Community Resources  Zone Management Tools: In Process (Comment: COPD zone management)  Other Services or Interventions: Patient expressed wanting a shower chair; [facility] recieved Documentation and will be sending to  6/10/22 CJT         Goals Addressed                    This Visit's Progress      Medication Management   Improving      I will take my medication as directed. I will notify my provider of any problems with medications, like adverse effects or side effects. I will notify my provider/Care Coordinator if I am unable to afford my medications. I will notify my provider for advice before I stop taking any of my medication. Barriers: financial  Plan for overcoming my barriers: ACM will look into different programs to help with cost of medications  Confidence: 8/10  Anticipated Goal Completion Date: 7/2/22        COMPLETED: Patient Stated (pt-stated)   No change      Patient would like to get a portable oxygen concentrator.     Barriers: lack of motivation and overwhelmed by complexity of regimen  Plan for overcoming my barriers: ACM will encourage patient to obtain a new 6 minute walk test with Pulm to obtain portable oxygen concentrator  Confidence: 8/10  Anticipated Goal Completion Date: 8/1/22    Patient does not qualify per last walk test that was completed 6/14/22    Patient does not qualify for portable oxygen concentrator 6/24/22             Prior to Admission medications    Medication Sig Start Date End Date Taking? Authorizing Provider   Misc. Devices KIT Shower chair. Use as directed. 6/3/22   Eric Alonso, DO   ipratropium-albuterol (DUONEB) 0.5-2.5 (3) MG/3ML SOLN nebulizer solution Take 3 mLs by nebulization every 4 hours 5/25/22   MIGUEL Valenzuela   Respiratory Therapy Supplies (NEBULIZER/TUBING/MOUTHPIECE) KIT 1 kit by Does not apply route daily as needed (use as directed.) 5/24/22   Eric Alonso, DO   [facility]. Devices KIT Rollator. Use as directed. 5/24/22   Eric Alonso, DO   Misc. Devices KIT Full face mask. Use as directed with oxygen for COPD.  Phone: [phone] 5/17/22   Eric Alonso, DO   fluticasone (FLONASE) 50 MCG/ACT nasal spray 1 spray by Each Nostril route daily 5/11/22   LING Pardo CNP   gabapentin (NEURONTIN) 600 MG tablet TAKE 2 TABLETS BY MOUTH THREE TIMES A DAY AS NEEDED FOR BACK PAIN 4/25/22 7/24/22  Eric Alonso, DO   budesonide-formoterol (SYMBICORT) 160-4.5 MCG/ACT AERO INHALE 2 PUFFS TWICE A DAY 12/27/21   Janet Burgess MD   albuterol sulfate  (90 Base) MCG/ACT inhaler INHALE 2 PUFFS EVERY 6 HOURS AS NEEDED WHEEZING 9/13/21   Eric Alonso, DO   tiotropium (SPIRIVA HANDIHALER) 18 MCG inhalation capsule Inhale 1 capsule into the lungs daily 6/3/21   Janet Burgess MD   ibuprofen (ADVIL;MOTRIN) 800 MG tablet TAKE 1 TABLET BY MOUTH EVERY 8 HOURS AS NEEDED PAIN WITH FOOD 12/26/19   Lum Fothergill, APRN - CNP   Respiratory Therapy Supplies (NEBULIZER/TUBING/MOUTHPIECE) KIT 1 kit by Does not apply route daily as needed (Patient is needing new equipment) 12/20/19   Melania Spain MD   risperiDONE (RISPERDAL) 3 MG tablet  11/26/19   Historical Provider, MD   aspirin 325 MG tablet Take 325 mg by mouth daily    Historical Provider, MD   traZODone (DESYREL) 100 MG tablet Take 0.5-1 tablets by mouth nightly as needed for Sleep 5/16/19   Eric Alonso DO   FLUoxetine (PROZAC) 40 MG capsule Take 1 capsule by mouth daily 12/27/18   Eric Alonso DO   buprenorphine (SUBUTEX) 2 MG SUBL SL tablet Place 16 mg under the tongue daily.      Historical Provider, MD   Nebulizer MISC by Does not apply route daily    Historical Provider, MD   topiramate (TOPAMAX) 50 MG tablet Take 50 mg by mouth daily     Historical Provider, MD   donepezil (ARICEPT) 5 MG tablet Take 1 tablet by mouth nightly 1/1/18   LING Tony - CNP       Future Appointments   Date Time Provider Varsha Solitario   6/28/2022 11:00 AM MD Hodan Felipe   8/4/2022  3:20 PM Marsha Downs MD AND PULM [facility]     ,   COPD Assessment    Does the patient understand envrionmental exposure?: Yes  Is the patient able to verbalize Rescue vs. Long Acting medications?: Yes  Does the patient have a nebulizer?: Yes  Does the patient use a space with inhaled medications?: No            Symptoms:         and   General Assessment    Do you have any symptoms that are causing concern?: No

## 2022-06-28 ENCOUNTER — OFFICE VISIT (OUTPATIENT)
Dept: CARDIOLOGY CLINIC | Age: 47
End: 2022-06-28
Payer: MEDICARE

## 2022-06-28 VITALS
HEART RATE: 86 BPM | DIASTOLIC BLOOD PRESSURE: 60 MMHG | BODY MASS INDEX: 18.81 KG/M2 | HEIGHT: 69 IN | WEIGHT: 127 LBS | OXYGEN SATURATION: 96 % | SYSTOLIC BLOOD PRESSURE: 92 MMHG

## 2022-06-28 DIAGNOSIS — Z79.899 MEDICATION MANAGEMENT: ICD-10-CM

## 2022-06-28 DIAGNOSIS — Z76.89 ESTABLISHING CARE WITH NEW DOCTOR, ENCOUNTER FOR: ICD-10-CM

## 2022-06-28 DIAGNOSIS — R06.02 SOB (SHORTNESS OF BREATH): Primary | ICD-10-CM

## 2022-06-28 PROCEDURE — 93000 ELECTROCARDIOGRAM COMPLETE: CPT | Performed by: INTERNAL MEDICINE

## 2022-06-28 PROCEDURE — 4004F PT TOBACCO SCREEN RCVD TLK: CPT | Performed by: INTERNAL MEDICINE

## 2022-06-28 PROCEDURE — G8427 DOCREV CUR MEDS BY ELIG CLIN: HCPCS | Performed by: INTERNAL MEDICINE

## 2022-06-28 PROCEDURE — 99204 OFFICE O/P NEW MOD 45 MIN: CPT | Performed by: INTERNAL MEDICINE

## 2022-06-28 PROCEDURE — G8420 CALC BMI NORM PARAMETERS: HCPCS | Performed by: INTERNAL MEDICINE

## 2022-06-28 NOTE — PROGRESS NOTES
[facility]   CARDIAC EVALUATION NOTE  [phone]      PCP:  Jerry Alegria DO    Reason for Consultation/Chief Complaint:  Re Establish , new pt as has not been here for 4 yrs     Subjective   History of Present Illness:  Michael Bravo is a 48 y.o. patient with a PMH notable for COPD and coronary artery atherosclerosis by CT 04/13/2022. LOV he presented for Cardiac Clearance due to abnormal EKG. He is a patient of Dr. Goldie Thompson, who is out of town. Patient has a displaced fx humerus and is needing surgery soon. LOV, 11/15/2018  he reported feeling ok. He stateed he was arm wrestling and broke his arm. He stated he has had previous strokes and also a heart attack which he was seen at [facility]. He does smoke and has a couple drinks a day. He is on disability due to his back. He stated he tries to walks 3 miles daily with a cane but is limited by joint issues. He has had cp/sob but overall he is a poor historian and hx is difficult to obtain from him. In the interm 11/2018 Echo EF 55%, the mitral valve anterior leaflet is thickened at 0.6cm. It opens normally. Trace mitral regurgitation. Stress Test lexiscan 11/2018 LVEF>60%. Normal wall motion  No significant areas of reversibility to suggest ischemia. Last in ED 04/12/12 for sob and congestion. Dx sepsis/pneumonia, copd, and acute hypoxic resp failure. Today, presents with walker and service dog. Says is on oxygen at night and prn during the day. Still has sob. Lives in [facility] now. The patient denies chest pain. Denies palpitations, dizziness, near-syncope or sorin syncope. Notes sob. Past Medical History:   has a past medical history of Arthritis; Back disorder; COPD (chronic obstructive pulmonary disease) ([address] Memory loss; Panic attacks; Psychiatric problem; and Unspecified cerebral artery occlusion with cerebral infarction.       Surgical History:   has a past surgical history that includes Humerus fracture surgery (Right, 4/1/2019). Social History:   reports that he has been smoking cigarettes. He has a 35.00 pack-year smoking history. He has never used smokeless tobacco. He reports current alcohol use. He reports previous drug use. Family History:  family history includes Cancer in his father and mother; Diabetes in his mother. Home Medications:  Were reviewed and are listed in nursing record and/or below  Prior to Admission medications    Medication Sig Start Date End Date Taking? Authorizing Provider   OXYGEN Inhale into the lungs   Yes Historical Provider, MD   ipratropium-albuterol (DUONEB) 0.5-2.5 (3) MG/3ML SOLN nebulizer solution Take 3 mLs by nebulization every 4 hours 5/25/22  Yes MIGUEL Pedroza   fluticasone [facility]) 50 MCG/ACT nasal spray 1 spray by Each Nostril route daily 5/11/22  Yes LING Joyce CNP   gabapentin (NEURONTIN) 600 MG tablet TAKE 2 TABLETS BY MOUTH THREE TIMES A DAY AS NEEDED FOR BACK PAIN 4/25/22 7/24/22 Yes Eric Alonso DO   budesonide-formoterol (SYMBICORT) 160-4.5 MCG/ACT AERO INHALE 2 PUFFS TWICE A DAY 12/27/21  Yes Presley Benoit MD   albuterol sulfate  (90 Base) MCG/ACT inhaler INHALE 2 PUFFS EVERY 6 HOURS AS NEEDED WHEEZING 9/13/21  Yes Eric Alonso DO   ibuprofen (ADVIL;MOTRIN) 800 MG tablet TAKE 1 TABLET BY MOUTH EVERY 8 HOURS AS NEEDED PAIN WITH FOOD 12/26/19  Yes LING Estrella CNP   aspirin 325 MG tablet Take 325 mg by mouth daily   Yes Historical Provider, MD   traZODone (DESYREL) 100 MG tablet Take 0.5-1 tablets by mouth nightly as needed for Sleep 5/16/19  Yes Eric Alonso DO   buprenorphine (SUBUTEX) 2 MG SUBL SL tablet Place 16 mg under the tongue daily. Yes Historical Provider, MD   donepezil (ARICEPT) 5 MG tablet Take 1 tablet by mouth nightly 1/1/18  Yes LING Hawk CNP   Misc. Devices KIT Shower chair. Use as directed.  6/3/22   Eric Alonso, DO   Respiratory Therapy Supplies (NEBULIZER/TUBING/MOUTHPIECE) KIT 1 kit by Does not apply route daily as needed (use as directed.) 5/24/22   Eric Alonso,    Misc. Devices KIT Rollator. Use as directed. 5/24/22   Eric Alonso, DO   Misc. Devices KIT Full face mask. Use as directed with oxygen for COPD. Phone: [phone] 5/17/22   Eric Alonso DO   tiotropium (Yodit Ny) 18 MCG inhalation capsule Inhale 1 capsule into the lungs daily  Patient not taking: Reported on 6/28/2022 6/3/21   Lucila Avila MD   Respiratory Therapy Supplies (NEBULIZER/TUBING/MOUTHPIECE) KIT 1 kit by Does not apply route daily as needed (Patient is needing new equipment) 12/20/19   Cindy Stephens MD   risperiDONE (RISPERDAL) 3 MG tablet  11/26/19   Historical Provider, MD   FLUoxetine (PROZAC) 40 MG capsule Take 1 capsule by mouth daily  Patient not taking: Reported on 6/28/2022 12/27/18   Lissette End, DO   Nebulizer MISC by Does not apply route daily    Historical Provider, MD   topiramate (TOPAMAX) 50 MG tablet Take 50 mg by mouth daily   Patient not taking: Reported on 6/28/2022    Historical Provider, MD          Allergies:  Mustard seed     Review of Systems:   A 14 point review of symptoms completed. Pertinent positives identified in the HPI, all other review of symptoms negative as below. Objective   PHYSICAL EXAM:    Vitals:    06/28/22 1055   BP: 92/60   Pulse: 86   SpO2: 96%    Weight: 127 lb (57.6 kg)       Gen: Patient in NAD, resting comfortably, sitting in chair   Neck: No JVD or bruits  Respiratory: CTAB no WRR  Chest: normal without deformity  Cardiovascular:RRR, S1S2, 1/6 sm   Abdomen: Soft, NTND, Normal BS  Extremities:tc edema LE  Neurological/Psychiatric:  AxO x4, No gross motors/sensory deficits  Skin:  Warm and dry      Labs   CBC:   Lab Results   Component Value Date    WBC 10.6 04/13/2022    RBC 4.10 04/13/2022    HGB 12.9 04/13/2022    HCT 39.2 04/13/2022    MCV 95.7 04/13/2022    RDW 13.7 04/13/2022     04/13/2022 CMP:  Lab Results   Component Value Date     2022    K 5.0 2022    CL 92 2022    CO2 31 2022    BUN 18 2022    CREATININE 0.9 2022    GFRAA >60 2022    AGRATIO 2.1 10/28/2021    LABGLOM >60 2022    GLUCOSE 169 2022    PROT 6.6 10/28/2021    CALCIUM 8.0 2022    BILITOT 0.4 10/28/2021    ALKPHOS 90 10/28/2021    AST 17 10/28/2021    ALT 11 10/28/2021     PT/INR:  No results found for: PTINR  HgBA1c:No results found for: LABA1C  Lab Results   Component Value Date    TROPONINI <0.01 2022         Cardiac Data     Last EK2022  Sinus tachycardia Rightward axis Pulmonary disease pattern Abnormal ECG Confirmed by Rachel Palmer MD, Danilo Escalera ([id number]) on 2022 5:45:56 PM     Today, nsr, nonspec st changes, borderline rt axis     Echo: 2018  Summary   Normal left ventricle systolic function with an estimated ejection fraction   of 55%. No regional wall motion abnormalities are seen. Normal left ventricular diastolic filling pressure. No valvular abnormalities present. The mitral valve anterior leaflet is thickened at 0.6cm. It opens normally. Trace mitral regurgitation. Stress Test: Lexiscan 2018   Normal LVEF >60%   Normal wall motion. No significant areas of reversibility to suggest ischemia. ECG Findings  There is 0.5mm st elevation as compared to baseline. EKG is nondiagnostic for ischemia. Arrhythmias  No significant rhythm abnormality. Symptoms  No symptoms with lexiscan. Cath:    Studies: CT Chest Pulmonary: 2022  Impression 1. No pulmonary emboli. 2. No thoracic aortic aneurysm or dissection. 3. Severe emphysematous changes, with apical predominance. Superimposed patchy nodular areas of consolidation, as well as associated bronchial thickening suggestive of bronchitis and bronchopneumonia. 4. No pleural effusion. 5. Coronary artery atherosclerosis.  6. 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 Reviewed EKG  2. I recommend ECHO to evaluate LV function and size, wall motion, and valves for any structural abnormalities and for sob, abnl ekg . CALL TO SCHEDULE  3. Lexiscan stress test to look for blockages and blood flow through the heart and for sob, abnl ekg . CALL TO SCHEDULE  4. CT calcium score test CALL TO SCHEDULE  5. Lab work LFT and lipids  NEED TO BE FASTING   6. Follow up with NP in the next 2 months    Your provider has ordered testing for further evaluation. An order/prescription has been included in your paper work.  To schedule outpatient testing, contact Central Scheduling by calling [facility] ([phone]). This note is scribed in the presence of Dr. Josiane Medrano MD   by Aubrey Garber RN       I, Dr Josiane Medrano, personally performed the services described in this documentation, as scribed by the above signed scribe in my presence. It is both accurate and complete to my knowledge. I agree with the details independently gathered by the clinical support staff and the scribed note accurately describes my personal service to the patient. Thank you for allowing us to participate in the care of Ed [facility]. Please call me with any questions [phone].     This note was scribed in the presence of Dr. Jhon Page MD by Darek Ambriz Herminio Mendoza RN. Teri French MD, [facility]   Interventional Cardiologist  [place] [id number]  [phone] [facility]  [phone] [address] [place] [address]  6/28/2022 11:30 AM    I will address the patient's cardiac risk factors and adjusted pharmacologic treatment as needed. In addition, I have reinforced the need for patient directed risk factor modification. Tobacco use was discussed with the patient and educated on the negative effects and was asked not to use. All questions and concerns were addressed to the patient/family. Alternatives to my treatment were discussed.

## 2022-06-28 NOTE — PATIENT INSTRUCTIONS
1. Reviewed EKG  2. I recommend ECHO to evaluate LV function and size, wall motion, and valves for any structural abnormalities. CALL TO SCHEDULE  3. Lexiscan stress test to look for blockages and blood flow through the heart. CALL TO SCHEDULE  4. CT calcium CALL TO SCHEDULE  5. Lab work LFT and lipids  NEED TO BE FASTING   6. Follow up with NP in the next 2 months    Your provider has ordered testing for further evaluation. An order/prescription has been included in your paper work.  To schedule outpatient testing, contact Central Scheduling by calling [address] [place] [address] ([phone]).

## 2022-07-08 ENCOUNTER — CARE COORDINATION (OUTPATIENT)
Dept: CARE COORDINATION | Age: 47
End: 2022-07-08

## 2022-07-12 ENCOUNTER — CARE COORDINATION (OUTPATIENT)
Dept: CARE COORDINATION | Age: 47
End: 2022-07-12

## 2022-07-12 NOTE — CARE COORDINATION
Ambulatory Care Coordination Note  7/12/2022  CM Risk Score: 5  Charlson 10 Year Mortality Risk Score: 10%     ACC: Annie Cranker, RN    Summary Note: ACM completed follow up call with patient who once again said he was having a difficult time with financial strains. Patient said his truck is not working and needs repair. Patient said he is having a difficult time with obtaining repairs and gas money d/t rising costs. ACM will refer to  to see if there are any other resources that can be provided to patient. AC has worked with pharmacy regarding setting up a payment plan, which patient was to contact to set that up. ACM has worked with setting up payment plan that patient was to contact [employer] for oxygen supplies. Patient said he has not called to set any payment plans up and is allowing bills to come in but not take care of them. ACM said he will need to call to take care of them so he is able to get medications and continue with oxygen supplies. ACM did inform patient there is financial assistance with [employer] to help patients who are unable to afford copayment's of oxygen supplies. Patient voiced understanding. Plan:    Referral for     Lab Results     None          Care Coordination Interventions    Referral from Primary Care Provider: No  Suggested Interventions and Community Resources  Social Work: In Process (Comment: Will refer out to )  Zone Management Tools: In Process (Comment: COPD zone management)         Goals Addressed                 This Visit's Progress     Medication Management   No change     I will take my medication as directed. I will notify my provider of any problems with medications, like adverse effects or side effects. I will notify my provider/Care Coordinator if I am unable to afford my medications. I will notify my provider for advice before I stop taking any of my medication.     Barriers: financial  Plan for overcoming my barriers: ACM will look into different programs to help with cost of medications  Confidence: 8/10  Anticipated Goal Completion Date: 7/2/22            Prior to Admission medications    Medication Sig Start Date End Date Taking? Authorizing Provider   OXYGEN Inhale into the lungs    Historical Provider, MD   Misc. Devices KIT Shower chair. Use as directed. 6/3/22   Eric Alonso DO   ipratropium-albuterol (DUONEB) 0.5-2.5 (3) MG/3ML SOLN nebulizer solution Take 3 mLs by nebulization every 4 hours 5/25/22   MIGUEL Lozano   Respiratory Therapy Supplies (NEBULIZER/TUBING/MOUTHPIECE) KIT 1 kit by Does not apply route daily as needed (use as directed.) 5/24/22   DO Aiden Sosa. Devices KIT Rollator. Use as directed. 5/24/22   DO Aiden Sosa. Devices KIT Full face mask. Use as directed with oxygen for COPD.  Phone: [phone] 5/17/22   Eric Alonso DO   fluticasone (FLONASE) 50 MCG/ACT nasal spray 1 spray by Each Nostril route daily 5/11/22   LING Escobar CNP   gabapentin (NEURONTIN) 600 MG tablet TAKE 2 TABLETS BY MOUTH THREE TIMES A DAY AS NEEDED FOR BACK PAIN 4/25/22 7/24/22  Eric Alonso DO   budesonide-formoterol (SYMBICORT) 160-4.5 MCG/ACT AERO INHALE 2 PUFFS TWICE A DAY 12/27/21   Maria Antonia David MD   albuterol sulfate  (90 Base) MCG/ACT inhaler INHALE 2 PUFFS EVERY 6 HOURS AS NEEDED WHEEZING 9/13/21   Eric Alosno DO   tiotropium (SPIRIVA HANDIHALER) 18 MCG inhalation capsule Inhale 1 capsule into the lungs daily  Patient not taking: Reported on 6/28/2022 6/3/21   Maria Antonia David MD   ibuprofen (ADVIL;MOTRIN) 800 MG tablet TAKE 1 TABLET BY MOUTH EVERY 8 HOURS AS NEEDED PAIN WITH FOOD 12/26/19   Barb Collet, APRN - CNP   Respiratory Therapy Supplies (NEBULIZER/TUBING/MOUTHPIECE) KIT 1 kit by Does not apply route daily as needed (Patient is needing new equipment) 12/20/19   Rick Ayon MD   risperiDONE (RISPERDAL) 3 MG tablet  11/26/19   Historical Provider, MD   aspirin 325 MG tablet Take 325 mg by mouth daily    Historical Provider, MD   traZODone (DESYREL) 100 MG tablet Take 0.5-1 tablets by mouth nightly as needed for Sleep 5/16/19   Eric Alonso DO   FLUoxetine (PROZAC) 40 MG capsule Take 1 capsule by mouth daily  Patient not taking: Reported on 6/28/2022 12/27/18   Eric Alonso DO   buprenorphine (SUBUTEX) 2 MG SUBL SL tablet Place 16 mg under the tongue daily. Historical Provider, MD   Nebulizer MISC by Does not apply route daily    Historical Provider, MD   topiramate (TOPAMAX) 50 MG tablet Take 50 mg by mouth daily   Patient not taking: Reported on 6/28/2022    Historical Provider, MD   donepezil (ARICEPT) 5 MG tablet Take 1 tablet by mouth nightly 1/1/18   LING Castano - CNP       Future Appointments   Date Time Provider Varsha Solitario   8/4/2022  3:20 PM Ruth De La Paz MD [facility]   8/22/2022 11:00 AM MD Kevin Osorio [facility]     ,   COPD Assessment    Does the patient understand envrionmental exposure?: Yes  Is the patient able to verbalize Rescue vs. Long Acting medications?: Yes  Does the patient have a nebulizer?: Yes  Does the patient use a space with inhaled medications?: No     No patient-reported symptoms         Symptoms:         and   General Assessment    Do you have any symptoms that are causing concern?: No                     Prior to sending this referral, please add both [facility] Social Workers to the patient's care team. The referrals are identified through the care team assignment. Please complete the questions below, and/or provide a narrative description of the identified patient need below and include this smart phrase in your patient encounter.     -This patient is referred this date to [facility] for review, assessment, and consultation as needed regarding the following presenting concern(s). Please bold all that apply. No    [facility] assessment of patient's substance use disorder, if any, is indicated and requested.     No   Patient has concerns about apparently deteriorating mental status. No   Patient desires assistance with locating an accessible behavioral health treatment provider. Yes   Patient has questions/concerns regarding application and/or eligibility for Medicaid. Yes   Patient has questions/concerns regarding application and/or eligibility for a Medicaid Waiver program ([place], Home Care Waiver, Assisted Living Waiver, etc.). Yes   Patient has questions/concerns about the cost of prescription drugs. Yes   Patient has questions/concerns regarding Medicare coverage, Including Part D prescription drug coverage. Yes   Patient desires supportive services in the home regarding activities of daily living (homemaking, transferring, bathing, toileting, transportation, shopping, etc.). Yes   Patient desires information about long-term care in a skilled nursing facility (SNF). Yes   Patient has inadequate and/or unaffordable housing. No   Patient desires assistance with smoking cessation (Note: select this option only if patient is reasonably unable to participate in smoking cessation support groups offered periodically at [facility]). No   Patient desires information about advance directives (Power of  [place], Living Will, DNR, Guardianship, etc.). Please provide additional information if needed: (additional needs, household size, monthly income, source of income) Everytime ACM calls to complete follow up assessment patient is unable to afford basics such as rent, gas, medication copayment (has OSHIIP co-payments per Pharm), transportation issues, and unable to afford oxygen copayment. Patient does not go into detail but has Medicare Part A and B patient does have Medicaid that will pay his Medicare Premium and that is it. I do not know if patient would qualify for any other assistance through Medicaid. Thanks.

## 2022-07-13 ENCOUNTER — CARE COORDINATION (OUTPATIENT)
Dept: CARE COORDINATION | Age: 47
End: 2022-07-13

## 2022-07-13 NOTE — CARE COORDINATION
[facility] to assess for potential community resources. An outreach phone call was attempted today without success. A voice mail message was left providing contact information. TERRANCE Orona will be advised of the outreach.

## 2022-07-14 ENCOUNTER — CARE COORDINATION (OUTPATIENT)
Dept: CARE COORDINATION | Age: 47
End: 2022-07-14

## 2022-07-14 NOTE — CARE COORDINATION
phoned patient today to discuss the referral initiated by Vimal.  Keren Cedeño advised that today was not a good day for an assessment. It was mutually determined that  will phone patient on 7/18/22 at approximately 900 am. Vimal will be advised of the outreach attempt.

## 2022-07-18 ENCOUNTER — CARE COORDINATION (OUTPATIENT)
Dept: CARE COORDINATION | Age: 47
End: 2022-07-18

## 2022-07-18 NOTE — CARE COORDINATION
contacted patient for a pre arranged appointment. Patient stated that he was driving at the time of the phone call and would be driving for the next three days.  was not comfortable interviewing patient while he was focused on driving. It was mutually agreed upon that  will contact patient on 7/25/22 to conduct an interview. TERRANCE Orona will be advised of the outreach.

## 2022-07-25 ENCOUNTER — CARE COORDINATION (OUTPATIENT)
Dept: CARE COORDINATION | Age: 47
End: 2022-07-25

## 2022-07-25 DIAGNOSIS — G89.29 CHRONIC BILATERAL THORACIC BACK PAIN: ICD-10-CM

## 2022-07-25 DIAGNOSIS — M54.6 CHRONIC BILATERAL THORACIC BACK PAIN: ICD-10-CM

## 2022-07-25 RX ORDER — GABAPENTIN 600 MG/1
TABLET ORAL
Qty: 180 TABLET | Refills: 2 | Status: SHIPPED | OUTPATIENT
Start: 2022-07-25 | End: 2022-07-26 | Stop reason: SDUPTHER

## 2022-07-26 DIAGNOSIS — G89.29 CHRONIC BILATERAL THORACIC BACK PAIN: ICD-10-CM

## 2022-07-26 DIAGNOSIS — M54.6 CHRONIC BILATERAL THORACIC BACK PAIN: ICD-10-CM

## 2022-07-26 RX ORDER — GABAPENTIN 600 MG/1
TABLET ORAL
Qty: 180 TABLET | Refills: 2 | Status: SHIPPED | OUTPATIENT
Start: 2022-07-26 | End: 2022-10-24

## 2022-07-26 NOTE — TELEPHONE ENCOUNTER
Refill Request - Controlled Substance    CONFIRM preferrred pharmacy with the patient.      Last Seen Department: 6/3/2022  Last Seen by PCP: 6/3/2022    Last Written: 07/25/2022 180 tablet 2 refills    Last UDS: 01/17/2019    Med Agreement Signed On: Not On File    Next Appointment: Visit date not found    Requested Prescriptions     Pending Prescriptions Disp Refills    gabapentin (NEURONTIN) 600 MG tablet 180 tablet 2     Sig: TAKE 2 TABLETS BY MOUTH THREE TIMES A DAY AS NEEDED FOR BACK PAIN

## 2022-07-26 NOTE — TELEPHONE ENCOUNTER
----- Message from Selena Shane sent at 7/26/2022  8:48 AM EDT -----  Subject: Refill Request    QUESTIONS  Name of Medication? gabapentin (NEURONTIN) 600 MG tablet  Patient-reported dosage and instructions? 600mg PRN  How many days do you have left? 0  Preferred Pharmacy? [id number]  [place] phone number (if available)? [phone]  Additional Information for Provider? Pt unsure of how many he has left.  ---------------------------------------------------------------------------  --------------  CALL BACK INFO  What is the best way for the office to contact you? OK to leave message on   voicemail  Preferred Call Back Phone Number? [phone]  ---------------------------------------------------------------------------  --------------  SCRIPT ANSWERS  Relationship to Patient?  Self

## 2022-07-27 DIAGNOSIS — J44.9 CHRONIC OBSTRUCTIVE PULMONARY DISEASE, UNSPECIFIED COPD TYPE (HCC): ICD-10-CM

## 2022-07-27 RX ORDER — BUDESONIDE AND FORMOTEROL FUMARATE DIHYDRATE 160; 4.5 UG/1; UG/1
AEROSOL RESPIRATORY (INHALATION)
Qty: 1 EACH | Refills: 0 | Status: SHIPPED | OUTPATIENT
Start: 2022-07-27 | End: 2022-08-04 | Stop reason: ALTCHOICE

## 2022-07-28 ENCOUNTER — CARE COORDINATION (OUTPATIENT)
Dept: CARE COORDINATION | Age: 47
End: 2022-07-28

## 2022-08-04 ENCOUNTER — TELEMEDICINE (OUTPATIENT)
Dept: PULMONOLOGY | Age: 47
End: 2022-08-04
Payer: MEDICARE

## 2022-08-04 ENCOUNTER — CARE COORDINATION (OUTPATIENT)
Dept: CARE COORDINATION | Age: 47
End: 2022-08-04

## 2022-08-04 DIAGNOSIS — J41.0 SIMPLE CHRONIC BRONCHITIS (HCC): Primary | ICD-10-CM

## 2022-08-04 DIAGNOSIS — F17.200 CURRENT SMOKER: ICD-10-CM

## 2022-08-04 DIAGNOSIS — I25.10 CORONARY ARTERY CALCIFICATION SEEN ON CT SCAN: ICD-10-CM

## 2022-08-04 DIAGNOSIS — J31.0 CHRONIC RHINITIS: ICD-10-CM

## 2022-08-04 DIAGNOSIS — J96.11 CHRONIC RESPIRATORY FAILURE WITH HYPOXIA (HCC): ICD-10-CM

## 2022-08-04 PROCEDURE — 99214 OFFICE O/P EST MOD 30 MIN: CPT | Performed by: INTERNAL MEDICINE

## 2022-08-04 PROCEDURE — 4004F PT TOBACCO SCREEN RCVD TLK: CPT | Performed by: INTERNAL MEDICINE

## 2022-08-04 PROCEDURE — 3023F SPIROM DOC REV: CPT | Performed by: INTERNAL MEDICINE

## 2022-08-04 PROCEDURE — G8420 CALC BMI NORM PARAMETERS: HCPCS | Performed by: INTERNAL MEDICINE

## 2022-08-04 PROCEDURE — G8427 DOCREV CUR MEDS BY ELIG CLIN: HCPCS | Performed by: INTERNAL MEDICINE

## 2022-08-04 RX ORDER — BUDESONIDE, GLYCOPYRROLATE, AND FORMOTEROL FUMARATE 160; 9; 4.8 UG/1; UG/1; UG/1
2 AEROSOL, METERED RESPIRATORY (INHALATION) 2 TIMES DAILY
Qty: 1 EACH | Refills: 3 | Status: SHIPPED | OUTPATIENT
Start: 2022-08-04

## 2022-08-04 RX ORDER — FLUTICASONE PROPIONATE 50 MCG
1 SPRAY, SUSPENSION (ML) NASAL DAILY
Qty: 2 EACH | Refills: 3 | Status: SHIPPED | OUTPATIENT
Start: 2022-08-04
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
## 2022-08-04 NOTE — CARE COORDINATION
Ambulatory Care Coordination Note  8/4/2022    ACC: Alex Martin, RN    Summary Note: ACM completed follow up call with patient, who reports setting up payment plans with [employer]. Patient said he had no concerns at this time. Plan:    F/U call 2 weeks   Graduation from [employer] (?)    Lab Results       None            Care Coordination Interventions    Referral from Primary Care Provider: No  Suggested Interventions and Community Resources  Social Work: Completed (Comment: Will refer out to )  Zone Management Tools: Completed (Comment: COPD zone management)          Goals Addressed                   This Visit's Progress     Medication Management   Improving     I will take my medication as directed. I will notify my provider of any problems with medications, like adverse effects or side effects. I will notify my provider/Care Coordinator if I am unable to afford my medications. I will notify my provider for advice before I stop taking any of my medication. Barriers: financial  Plan for overcoming my barriers: ACM will look into different programs to help with cost of medications  Confidence: 8/10  Anticipated Goal Completion Date: 7/2/22              Prior to Admission medications    Medication Sig Start Date End Date Taking? Authorizing Provider   budesonide-formoterol (SYMBICORT) 160-4.5 MCG/ACT AERO INHALE 2 PUFFS TWICE A DAY 7/27/22   LING Jaimes - CNP   gabapentin (NEURONTIN) 600 MG tablet TAKE 2 TABLETS BY MOUTH THREE TIMES A DAY AS NEEDED FOR BACK PAIN 7/26/22 8/25/22  Eric Alonso, DO   OXYGEN Inhale into the lungs    Historical Provider, MD   Misc. Devices KIT Shower chair. Use as directed.  6/3/22   Eric Alonso, DO   ipratropium-albuterol (DUONEB) 0.5-2.5 (3) MG/3ML SOLN nebulizer solution Take 3 mLs by nebulization every 4 hours 5/25/22   MIGUEL Khan   Respiratory Therapy Supplies (NEBULIZER/TUBING/MOUTHPIECE) KIT 1 kit by Does not apply route daily as needed (use as directed.) 5/24/22   Eric Alonso DO   Misc. Devices KIT Rollator. Use as directed. 5/24/22   Eric Alonso DO   Misc. Devices KIT Full face mask. Use as directed with oxygen for COPD. Phone: [phone] 5/17/22   Eric Alonso DO   fluticasone (FLONASE) 50 MCG/ACT nasal spray 1 spray by Each Nostril route daily 5/11/22   LING Hope CNP   albuterol sulfate  (90 Base) MCG/ACT inhaler INHALE 2 PUFFS EVERY 6 HOURS AS NEEDED WHEEZING 9/13/21   Eric Alonso DO   tiotropium (SPIRIVA HANDIHALER) 18 MCG inhalation capsule Inhale 1 capsule into the lungs daily  Patient not taking: Reported on 6/28/2022 6/3/21   Nadya Bryan MD   ibuprofen (ADVIL;MOTRIN) 800 MG tablet TAKE 1 TABLET BY MOUTH EVERY 8 HOURS AS NEEDED PAIN WITH FOOD 12/26/19   LING Cueva CNP   Respiratory Therapy Supplies (NEBULIZER/TUBING/MOUTHPIECE) KIT 1 kit by Does not apply route daily as needed (Patient is needing new equipment) 12/20/19   Ryan Burnett MD   risperiDONE (RISPERDAL) 3 MG tablet  11/26/19   Historical Provider, MD   aspirin 325 MG tablet Take 325 mg by mouth daily    Historical Provider, MD   traZODone (DESYREL) 100 MG tablet Take 0.5-1 tablets by mouth nightly as needed for Sleep 5/16/19   Eric Alonso DO   FLUoxetine (PROZAC) 40 MG capsule Take 1 capsule by mouth daily  Patient not taking: Reported on 6/28/2022 12/27/18   Eric Alonso DO   buprenorphine (SUBUTEX) 2 MG SUBL SL tablet Place 16 mg under the tongue daily.      Historical Provider, MD   Nebulizer MISC by Does not apply route daily    Historical Provider, MD   topiramate (TOPAMAX) 50 MG tablet Take 50 mg by mouth daily   Patient not taking: Reported on 6/28/2022    Historical Provider, MD   donepezil (ARICEPT) 5 MG tablet Take 1 tablet by mouth nightly 1/1/18   LING Esteban CNP       Future Appointments   Date Time Provider Varsha Solitario   8/4/2022  3:20 PM Nadya Bryan MD [facility]   8/22/2022 11:00 AM Lance Hinds MD Fide Nichole [facility]   ,   COPD Assessment    Does the patient understand envrionmental exposure?: Yes  Is the patient able to verbalize Rescue vs. Long Acting medications?: Yes  Does the patient have a nebulizer?: Yes  Does the patient use a space with inhaled medications?: No     No patient-reported symptoms         Symptoms:          , and   General Assessment    Do you have any symptoms that are causing concern?: No

## 2022-08-08 ENCOUNTER — CARE COORDINATION (OUTPATIENT)
Dept: CARE COORDINATION | Age: 47
End: 2022-08-08

## 2022-08-08 NOTE — CARE COORDINATION
placed a follow up phone call to the patient. Mr. Padmini Lucas acknowledged receipt of the resource literature that was mailed to him, although he stated he has been busy and has not had the opportunity to review the material. It was mutually determined that  would follow up again in approximately 30 days to see if questions exist. TERRANCE De Leon will be advised of the outreach.

## 2022-08-23 ENCOUNTER — CARE COORDINATION (OUTPATIENT)
Dept: CARE COORDINATION | Age: 47
End: 2022-08-23

## 2022-08-23 NOTE — CARE COORDINATION
Ambulatory Care Coordination Note  8/23/2022    ACC: Cuba Agudelo RN    Summary Note: ACM completed follow up call with patient who has no other active care needs at this time and all goals have been met. ACM will graduate from [address] [place] [address] at this time. Lab Results       None            Care Coordination Interventions    Referral from Primary Care Provider: No  Suggested Interventions and Community Resources  Social Work: Completed (Comment: Will refer out to )  Zone Management Tools: Completed (Comment: COPD zone management)          Goals Addressed                   This Visit's Progress     COMPLETED: Medication Management        I will take my medication as directed. I will notify my provider of any problems with medications, like adverse effects or side effects. I will notify my provider/Care Coordinator if I am unable to afford my medications. I will notify my provider for advice before I stop taking any of my medication. Barriers: financial  Plan for overcoming my barriers: ACM will look into different programs to help with cost of medications  Confidence: 8/10  Anticipated Goal Completion Date: 7/2/22              Prior to Admission medications    Medication Sig Start Date End Date Taking? Authorizing Provider   fluticasone (FLONASE) 50 MCG/ACT nasal spray 1 spray by Each Nostril route in the morning. 8/4/22   Jaqui Gross MD   Budeson-Glycopyrrol-Formoterol (BREZTRI AEROSPHERE) 160-9-4.8 MCG/ACT AERO Inhale 2 puffs into the lungs in the morning and at bedtime 8/4/22   Jaqui Gross MD   gabapentin (NEURONTIN) 600 MG tablet TAKE 2 TABLETS BY MOUTH THREE TIMES A DAY AS NEEDED FOR BACK PAIN 7/26/22 8/25/22  Eric Alonso DO   OXYGEN Inhale 4 L into the lungs    Historical Provider, MD   Misc. Devices KIT Shower chair. Use as directed.  6/3/22   Eric Alonso, DO   ipratropium-albuterol (DUONEB) 0.5-2.5 (3) MG/3ML SOLN nebulizer solution Take 3 mLs by nebulization every 4 hours 5/25/22   Shilpi Beach MIGUEL Garzon   Respiratory Therapy Supplies (NEBULIZER/TUBING/MOUTHPIECE) KIT 1 kit by Does not apply route daily as needed (use as directed.) 5/24/22   Eric Alonso DO   Misc. Devices KIT Rollator. Use as directed. 5/24/22   DO Gena Sosac. Devices KIT Full face mask. Use as directed with oxygen for COPD. Phone: [phone] 5/17/22   Eric Alonso DO   albuterol sulfate  (90 Base) MCG/ACT inhaler INHALE 2 PUFFS EVERY 6 HOURS AS NEEDED WHEEZING 9/13/21   Eric Alonso DO   tiotropium (SPIRIVA HANDIHALER) 18 MCG inhalation capsule Inhale 1 capsule into the lungs daily  Patient not taking: Reported on 6/28/2022 6/3/21   Shanique Rocha MD   ibuprofen (ADVIL;MOTRIN) 800 MG tablet TAKE 1 TABLET BY MOUTH EVERY 8 HOURS AS NEEDED PAIN WITH FOOD 12/26/19   Sara Ndiaye APRN - CNP   Respiratory Therapy Supplies (NEBULIZER/TUBING/MOUTHPIECE) KIT 1 kit by Does not apply route daily as needed (Patient is needing new equipment) 12/20/19   Maurizio Linares MD   risperiDONE (RISPERDAL) 3 MG tablet  11/26/19   Historical Provider, MD   aspirin 325 MG tablet Take 325 mg by mouth daily  Patient not taking: Reported on 8/4/2022    Historical Provider, MD   traZODone (DESYREL) 100 MG tablet Take 0.5-1 tablets by mouth nightly as needed for Sleep 5/16/19   Eric Alonso DO   FLUoxetine (PROZAC) 40 MG capsule Take 1 capsule by mouth daily  Patient not taking: Reported on 6/28/2022 12/27/18   Eric Alonso DO   buprenorphine (SUBUTEX) 2 MG SUBL SL tablet Place 16 mg under the tongue daily. Historical Provider, MD   Nebulizer MISC by Does not apply route daily    Historical Provider, MD   topiramate (TOPAMAX) 50 MG tablet Take 50 mg by mouth daily   Patient not taking: Reported on 6/28/2022    Historical Provider, MD   donepezil (ARICEPT) 5 MG tablet Take 1 tablet by mouth nightly  Patient not taking: Reported on 8/4/2022 1/1/18   Nay Bhagat, APRN - CNP       No future appointments. ,   COPD Assessment Does the patient understand envrionmental exposure?: Yes  Is the patient able to verbalize Rescue vs. Long Acting medications?: Yes  Does the patient have a nebulizer?: Yes  Does the patient use a space with inhaled medications?: No            Symptoms:          , and   General Assessment    Do you have any symptoms that are causing concern?: No

## 2022-08-24 ENCOUNTER — CARE COORDINATION (OUTPATIENT)
Dept: CARE COORDINATION | Age: 47
End: 2022-08-24

## 2022-08-24 NOTE — CARE COORDINATION
placed a final outreach phone call to patient to determine if any additional resource questions existed. Patient advised that he has no further questions at this time. No further outreach will be conducted.

## 2022-09-01 DIAGNOSIS — G89.29 CHRONIC BILATERAL THORACIC BACK PAIN: ICD-10-CM

## 2022-09-01 DIAGNOSIS — M54.6 CHRONIC BILATERAL THORACIC BACK PAIN: ICD-10-CM

## 2022-09-01 RX ORDER — IBUPROFEN 800 MG/1
TABLET ORAL
Qty: 60 TABLET | Refills: 3 | Status: SHIPPED | OUTPATIENT
Start: 2022-09-01

## 2022-09-01 NOTE — TELEPHONE ENCOUNTER
Refill Request     CONFIRM preferrred pharmacy with the patient. If Mail Order Rx - Pend for 90 day refill.       Last Seen: Last Seen Department: 6/3/2022  Last Seen by PCP: 6/3/2022    Last Written: 12/26/2019 60 with 0    Next Appointment:   Future Appointments   Date Time Provider Varsha Solitario   9/7/2022  4:00 PM [facility]ED[facility]  AWV L[facility] [place] - DYD             Requested Prescriptions     Pending Prescriptions Disp Refills    ibuprofen (ADVIL;MOTRIN) 800 MG tablet 60 tablet 0

## 2022-09-01 NOTE — TELEPHONE ENCOUNTER
----- Message from Ana Santos sent at 9/1/2022 11:24 AM EDT -----  Subject: Refill Request    QUESTIONS  Name of Medication? ibuprofen (ADVIL;MOTRIN) 800 MG tablet  Patient-reported dosage and instructions? 800 MG 1 tablet every 4 to 8   hours as needed  How many days do you have left? 0  Preferred Pharmacy? [address]  [place] phone number (if available)? [phone]  ---------------------------------------------------------------------------  --------------  Rocío Bobby INFO  What is the best way for the office to contact you? OK to leave message on   voicemail  Preferred Call Back Phone Number? [phone]  ---------------------------------------------------------------------------  --------------  SCRIPT ANSWERS  Relationship to Patient?  Self

## 2022-09-07 ENCOUNTER — TELEMEDICINE (OUTPATIENT)
Dept: FAMILY MEDICINE CLINIC | Age: 47
End: 2022-09-07
Payer: MEDICARE

## 2022-09-07 DIAGNOSIS — Z00.00 MEDICARE ANNUAL WELLNESS VISIT, SUBSEQUENT: Primary | ICD-10-CM

## 2022-09-07 PROCEDURE — G0439 PPPS, SUBSEQ VISIT: HCPCS | Performed by: FAMILY MEDICINE

## 2022-09-07 ASSESSMENT — PATIENT HEALTH QUESTIONNAIRE - PHQ9
SUM OF ALL RESPONSES TO PHQ QUESTIONS 1-9: 2
SUM OF ALL RESPONSES TO PHQ QUESTIONS 1-9: 2
3. TROUBLE FALLING OR STAYING ASLEEP: 0
7. TROUBLE CONCENTRATING ON THINGS, SUCH AS READING THE NEWSPAPER OR WATCHING TELEVISION: 1
9. THOUGHTS THAT YOU WOULD BE BETTER OFF DEAD, OR OF HURTING YOURSELF: 0
8. MOVING OR SPEAKING SO SLOWLY THAT OTHER PEOPLE COULD HAVE NOTICED. OR THE OPPOSITE, BEING SO FIGETY OR RESTLESS THAT YOU HAVE BEEN MOVING AROUND A LOT MORE THAN USUAL: 0
1. LITTLE INTEREST OR PLEASURE IN DOING THINGS: 0
6. FEELING BAD ABOUT YOURSELF - OR THAT YOU ARE A FAILURE OR HAVE LET YOURSELF OR YOUR FAMILY DOWN: 0
5. POOR APPETITE OR OVEREATING: 0
SUM OF ALL RESPONSES TO PHQ QUESTIONS 1-9: 2
SUM OF ALL RESPONSES TO PHQ QUESTIONS 1-9: 2
2. FEELING DOWN, DEPRESSED OR HOPELESS: 0
SUM OF ALL RESPONSES TO PHQ9 QUESTIONS 1 & 2: 0
10. IF YOU CHECKED OFF ANY PROBLEMS, HOW DIFFICULT HAVE THESE PROBLEMS MADE IT FOR YOU TO DO YOUR WORK, TAKE CARE OF THINGS AT HOME, OR GET ALONG WITH OTHER PEOPLE: 0
4. FEELING TIRED OR HAVING LITTLE ENERGY: 1

## 2022-09-07 ASSESSMENT — LIFESTYLE VARIABLES
HOW OFTEN DO YOU HAVE A DRINK CONTAINING ALCOHOL: MONTHLY OR LESS
HOW MANY STANDARD DRINKS CONTAINING ALCOHOL DO YOU HAVE ON A TYPICAL DAY: 1 OR 2

## 2022-09-07 NOTE — PROGRESS NOTES
Hearing/Vision:  Do you or your family notice any trouble with your hearing that hasn't been managed with hearing aids?: No (has hearing aid)  Do you have difficulty driving, watching TV, or doing any of your daily activities because of your eyesight?: No  Have you had an eye exam within the past year?: (!) No  No results found. Hearing/Vision Interventions:  Vision concerns:  patient encouraged to make appointment with his/her eye specialist    Safety:  Do you have working smoke detectors?: (!) No  Do you have any tripping hazards - loose or unsecured carpets or rugs?: No  Do you have any tripping hazards - clutter in doorways, halls, or stairs?: No  Do you have either shower bars, grab bars, non-slip mats or non-slip surfaces in your shower or bathtub?: Yes  Do all of your stairways have a railing or banister?: Not Applicable  Do you always fasten your seatbelt when you are in a car?: (!) No  Safety Interventions:  Home safety tips provided           Objective      Patient-Reported Vitals  Patient-Reported Weight: 135lb  Patient-Reported Height: 5' 9\"     Patient did not monitor blood pressure at home       Allergies   Allergen Reactions    Mustard Seed Swelling     Throat closes up     Prior to Visit Medications    Medication Sig Taking? Authorizing Provider   ibuprofen (ADVIL;MOTRIN) 800 MG tablet TAKE 1 TABLET BY MOUTH EVERY 8 HOURS AS NEEDED PAIN WITH FOOD  Sogregory Alonso, DO   fluticasone (FLONASE) 50 MCG/ACT nasal spray 1 spray by Each Nostril route in the morning. Gt Mares MD   Budeson-Glycopyrrol-Formoterol (BREZTRI AEROSPHERE) 160-9-4.8 MCG/ACT AERO Inhale 2 puffs into the lungs in the morning and at bedtime  Gt Mares MD   gabapentin (NEURONTIN) 600 MG tablet TAKE 2 TABLETS BY MOUTH THREE TIMES A DAY AS NEEDED FOR BACK PAIN  Eric Alonso, DO   OXYGEN Inhale 4 L into the lungs  Historical Provider, MD [employer]. Devices KIT Shower chair. Use as directed.   Beata Pena DO ipratropium-albuterol (DUONEB) 0.5-2.5 (3) MG/3ML SOLN nebulizer solution Take 3 mLs by nebulization every 4 hours  MIGUEL Metzger   Respiratory Therapy Supplies (NEBULIZER/TUBING/MOUTHPIECE) KIT 1 kit by Does not apply route daily as needed (use as directed.)  Berta Wyatt DO   Misc. Devices KIT Rollator. Use as directed. Berta Fang, DO   Misc. Devices KIT Full face mask. Use as directed with oxygen for COPD. Phone: [phone]  Berta Wyatt DO   albuterol sulfate  (90 Base) MCG/ACT inhaler INHALE 2 PUFFS EVERY 6 HOURS AS NEEDED WHEEZING  Eric Alonso DO   tiotropium (SPIRIVA HANDIHALER) 18 MCG inhalation capsule Inhale 1 capsule into the lungs daily  Patient not taking: Reported on 6/28/2022  Karlos Lama MD   Respiratory Therapy Supplies (NEBULIZER/TUBING/MOUTHPIECE) KIT 1 kit by Does not apply route daily as needed (Patient is needing new equipment)  Saadia Harrell MD   risperiDONE (RISPERDAL) 3 MG tablet   Historical Provider, MD   aspirin 325 MG tablet Take 325 mg by mouth daily  Patient not taking: Reported on 8/4/2022  Historical Provider, MD   traZODone (DESYREL) 100 MG tablet Take 0.5-1 tablets by mouth nightly as needed for Sleep  Eric Alonso DO   FLUoxetine (PROZAC) 40 MG capsule Take 1 capsule by mouth daily  Patient not taking: Reported on 6/28/2022  Eric Alonso DO   buprenorphine (SUBUTEX) 2 MG SUBL SL tablet Place 16 mg under the tongue daily.    Historical Provider, MD   Nebulizer MISC by Does not apply route daily  Historical Provider, MD   topiramate (TOPAMAX) 50 MG tablet Take 50 mg by mouth daily   Patient not taking: Reported on 6/28/2022  Historical Provider, MD   donepezil (ARICEPT) 5 MG tablet Take 1 tablet by mouth nightly  Patient not taking: Reported on 8/4/2022  Mulugeta Sexton APRN - CNP       CareTeam (Including outside providers/suppliers regularly involved in providing care):   Patient Care Team:  Berta Wyatt DO as PCP - General (Family Medicine)  Madison Coppola DO as PCP - [facility]  Harini Bird MD as Surgeon (Orthopedic Surgery)  Shanique Rocha MD as Consulting Physician (Pulmonology)     Reviewed and updated this visit:  Tobacco  Meds  Med Hx  Surg Hx  Soc Hx  Fam Hx          Vasyl Hall, was evaluated through a synchronous (real-time) audio-video encounter. The patient (or guardian if applicable) is aware that this is a billable service, which includes applicable co-pays. This Virtual Visit was conducted with patient's (and/or legal guardian's) consent. The visit was conducted pursuant to the emergency declaration under the [address] [place] [address] authority and the [employer] [facility] [employer] General Act. Patient identification was verified, and a caregiver was present when appropriate. The patient was located at Home: [place] [address]. Provider was located at [facility] (Appt Dept): [address]  [address] [facility] [address]. Franko Merrill LPN, [birth year], performed the documented evaluation under the direct supervision of the attending physician. This encounter was performed under Tito siegel, Audrey, direct supervision, 9/7/2022.

## 2022-09-09 ENCOUNTER — TELEPHONE (OUTPATIENT)
Dept: FAMILY MEDICINE CLINIC | Age: 47
End: 2022-09-09

## 2022-09-09 NOTE — TELEPHONE ENCOUNTER
Spoke with Tremaine from [employer], he stated patient is under the recommended age for a kit to be sent - it is recommended that 50-85, Medicare B be will only pay for a certain amount of the testing. Aida Lewis has cancelled this order.

## 2022-09-26 DIAGNOSIS — F17.210 CIGARETTE NICOTINE DEPENDENCE WITHOUT COMPLICATION: ICD-10-CM

## 2022-09-26 RX ORDER — ALBUTEROL SULFATE 90 UG/1
AEROSOL, METERED RESPIRATORY (INHALATION)
Qty: 8.5 G | Refills: 11 | Status: SHIPPED | OUTPATIENT
Start: 2022-09-26

## 2022-09-26 NOTE — TELEPHONE ENCOUNTER
----- Message from Mitchell Gillis sent at 9/26/2022  1:30 PM EDT -----  Subject: Refill Request    QUESTIONS  Name of Medication? albuterol sulfate  (90 Base) MCG/ACT inhaler  Patient-reported dosage and instructions? INHALE 2 PUFFS EVERY 6 HOURS AS   NEEDED WHEEZING  How many days do you have left? 0  Preferred Pharmacy? [address]  [place] phone number (if available)? [phone]  ---------------------------------------------------------------------------  --------------  Minoo OCHOA  What is the best way for the office to contact you? OK to leave message on   voicemail  Preferred Call Back Phone Number? [phone]  ---------------------------------------------------------------------------  --------------  SCRIPT ANSWERS  Relationship to Patient?  Self

## 2022-09-26 NOTE — TELEPHONE ENCOUNTER
Refill Request     CONFIRM preferrred pharmacy with the patient. If Mail Order Rx - Pend for 90 day refill. Last Seen: Last Seen Department: 9/7/2022  Last Seen by PCP: 6/3/2022    Last Written:     If no future appointment scheduled, route STAFF MESSAGE with patient name to the [facility] for scheduling. Next Appointment:   No future appointments. Message sent to [address] [place] [address] to schedule appt with patient? NO      Requested Prescriptions     Pending Prescriptions Disp Refills    albuterol sulfate HFA (PROVENTIL;VENTOLIN;PROAIR) 108 (90 Base) MCG/ACT inhaler 8.5 g 11     Sig: INHALE 2 PUFFS EVERY 6 HOURS AS NEEDED WHEEZING   .

## 2022-10-24 DIAGNOSIS — G89.29 CHRONIC BILATERAL THORACIC BACK PAIN: ICD-10-CM

## 2022-10-24 DIAGNOSIS — M54.6 CHRONIC BILATERAL THORACIC BACK PAIN: ICD-10-CM

## 2022-10-24 RX ORDER — GABAPENTIN 600 MG/1
TABLET ORAL
Qty: 180 TABLET | Refills: 2 | Status: SHIPPED | OUTPATIENT
Start: 2022-10-24 | End: 2022-11-23

## 2022-10-24 NOTE — TELEPHONE ENCOUNTER
Refill Request - Controlled Substance    CONFIRM preferrred pharmacy with the patient. If Mail Order Rx - Pend for 90 day refill. Last Seen Department: 9/7/2022  Last Seen by PCP: 6/3/2022    Last Written: 07/26/2022 180 tablet 2 refills     Last UDS: 01/17/2019    Med Agreement Signed On: Not On File    If no future appointment scheduled, route STAFF MESSAGE with patient name to the [facility] for scheduling. CONFIRM preferrred pharmacy with the patient. Next Appointment:   No future appointments. Message sent to [address] [place] [address] to schedule appt with patient?   N/A      Requested Prescriptions     Pending Prescriptions Disp Refills    gabapentin (NEURONTIN) 600 MG tablet [Pharmacy Med Name: Gabapentin 600MG TABS] 180 tablet 2     Sig: TAKE 2 TABLETS BY MOUTH THREE TIMES A DAY AS NEEDED FOR BACK PAIN

## 2022-10-24 NOTE — TELEPHONE ENCOUNTER
Refill Request - Controlled Substance    CONFIRM preferrred pharmacy with the patient. If Mail Order Rx - Pend for 90 day refill. Last Seen Department: 9/7/2022  Last Seen by PCP: 6/3/2022    Last Written: 07/26/2022 180 tablet 2 refills     Last UDS: 01/17/2019    Med Agreement Signed On: Not On File    If no future appointment scheduled, route STAFF MESSAGE with patient name to the [facility] for scheduling. CONFIRM preferrred pharmacy with the patient. Next Appointment:   No future appointments. Message sent to [address] [place] [address] to schedule appt with patient?   N/A      Requested Prescriptions     Pending Prescriptions Disp Refills    gabapentin (NEURONTIN) 600 MG tablet [Pharmacy Med Name: Gabapentin 600MG TABS] 180 tablet 2     Sig: TAKE 2 TABLETS BY MOUTH THREE TIMES A DAY AS NEEDED FOR BACK PAIN

## 2022-10-24 NOTE — TELEPHONE ENCOUNTER
----- Message from Erica Vazquez sent at 10/24/2022 11:31 AM EDT -----  Subject: Refill Request    QUESTIONS  Name of Medication? gabapentin (NEURONTIN) 600 MG tablet  Patient-reported dosage and instructions? 1 tablet 3-4 times a day   How many days do you have left? 0  Preferred Pharmacy? [address]  [place] phone number (if available)? [phone]  Additional Information for Provider? Patient said this medication needs to   be sent to his pharmacy and he was wanting to know if the medication could   be increased because it is not working like it was   ---------------------------------------------------------------------------  --------------  [address]  What is the best way for the office to contact you? OK to leave message on   voicemail  Preferred Call Back Phone Number? [phone]  ---------------------------------------------------------------------------  --------------  SCRIPT ANSWERS  Relationship to Patient?  Self

## 2022-11-07 RX ORDER — IPRATROPIUM BROMIDE AND ALBUTEROL SULFATE 2.5; .5 MG/3ML; MG/3ML
SOLUTION RESPIRATORY (INHALATION)
Qty: 180 EACH | Refills: 2 | Status: SHIPPED | OUTPATIENT
Start: 2022-11-07

## 2022-11-07 NOTE — TELEPHONE ENCOUNTER
Refill Request     CONFIRM preferrred pharmacy with the patient. If Mail Order Rx - Pend for 90 day refill. Last Seen: Last Seen Department: 9/7/2022  Last Seen by PCP: Visit date not found    Last Written: 05/25/2022 180 each 2 refills     If no future appointment scheduled, route STAFF MESSAGE with patient name to the [facility] for scheduling. Next Appointment:   No future appointments. Message sent to [address] [place] [address] to schedule appt with patient?   N/A      Requested Prescriptions     Pending Prescriptions Disp Refills    ipratropium-albuterol (DUONEB) 0.5-2.5 (3) MG/3ML SOLN nebulizer solution [Pharmacy Med Name: IPR/ALB 0.5-3MG 0.5-2.5 (3) Solution] 540 mL 2     Sig: NEBULIZE CONTENTS OF 1 VIAL EVERY 4 HOURS

## 2022-11-22 ENCOUNTER — TELEPHONE (OUTPATIENT)
Dept: FAMILY MEDICINE CLINIC | Age: 47
End: 2022-11-22

## 2022-11-22 NOTE — TELEPHONE ENCOUNTER
Hi Dr. Marci Villatroo, I know you don't do medical mariajuana cards. Do you know of any Doctors in the [facility] here who do?

## 2022-11-22 NOTE — TELEPHONE ENCOUNTER
I unfortunately do not know when medical marijuana cards are given. He can research that online in a Google search.

## 2022-11-22 NOTE — TELEPHONE ENCOUNTER
----- Message from Comfort Feliz sent at 11/22/2022 10:27 AM EST -----  Subject: Message to Provider    QUESTIONS  Information for Provider? Pt states that he needs the provider to write   him a prescription for medical marijuana since he's replaced most of the   medications since starting back smoking medical marijuana   ---------------------------------------------------------------------------  --------------  Jagdeep Jorge INFO  [phone]; OK to leave message on voicemail  ---------------------------------------------------------------------------  --------------  SCRIPT ANSWERS  Relationship to Patient?  Self

## 2022-12-02 RX ORDER — ALBUTEROL SULFATE 90 UG/1
2 AEROSOL, METERED RESPIRATORY (INHALATION) EVERY 6 HOURS PRN
Qty: 18 G | Refills: 5 | Status: SHIPPED | OUTPATIENT
Start: 2022-12-02

## 2022-12-02 NOTE — TELEPHONE ENCOUNTER
----- Message from Melecio Thomas sent at 12/1/2022  9:42 AM EST -----  Subject: Message to Provider    QUESTIONS  Information for Provider? [facility] is calling about inhaler   script that they can no longer fill due to supply issues. Please call back   [phone]. M-F closed 12-1 and open 830-5pm   ---------------------------------------------------------------------------  --------------  Ankit OCHOA  [phone]; OK to leave message on voicemail  ---------------------------------------------------------------------------  --------------  SCRIPT ANSWERS  Relationship to Patient? Third Party  Third Party Type? Pharmacy?    Representative Name? [facility] pharmacy

## 2022-12-02 NOTE — TELEPHONE ENCOUNTER
----- Message from Heanibal Fernandez sent at 12/1/2022  9:42 AM EST -----  Subject: Message to Provider    QUESTIONS  Information for Provider[facility]   [facility] that they can no longer fill due to supply issues. Please call back   [phone]. M-F closed 12-1 and open 830-5pm   ---------------------------------------------------------------------------  --------------  Codi Bosch INFO  [phone]; OK to leave message on voicemail  ---------------------------------------------------------------------------  --------------  SCRIPT ANSWERS  Relationship to Patient? Third Party  Third Party Type? Pharmacy?    Representative Name? [facility] pharmacy

## 2022-12-02 NOTE — TELEPHONE ENCOUNTER
Spoke with Azul Vigil from [facility], she states that they can no longer order the 8.5 g pack size. But they can order the 18 g ( as dosage of 90 mcg)     Please send a new scription over to [facility]. Inhaler is pend.

## 2023-01-23 DIAGNOSIS — G89.29 CHRONIC BILATERAL THORACIC BACK PAIN: ICD-10-CM

## 2023-01-23 DIAGNOSIS — M54.6 CHRONIC BILATERAL THORACIC BACK PAIN: ICD-10-CM

## 2023-01-24 RX ORDER — GABAPENTIN 600 MG/1
TABLET ORAL
Qty: 180 TABLET | Refills: 2 | Status: SHIPPED | OUTPATIENT
Start: 2023-01-24 | End: 2023-02-22

## 2023-01-30 ENCOUNTER — OFFICE VISIT (OUTPATIENT)
Dept: FAMILY MEDICINE CLINIC | Age: 48
End: 2023-01-30
Payer: MEDICARE

## 2023-01-30 VITALS — DIASTOLIC BLOOD PRESSURE: 82 MMHG | WEIGHT: 132 LBS | BODY MASS INDEX: 19.49 KG/M2 | SYSTOLIC BLOOD PRESSURE: 148 MMHG

## 2023-01-30 DIAGNOSIS — F03.90 DEMENTIA WITHOUT BEHAVIORAL DISTURBANCE (HCC): ICD-10-CM

## 2023-01-30 DIAGNOSIS — F17.210 CIGARETTE NICOTINE DEPENDENCE WITHOUT COMPLICATION: ICD-10-CM

## 2023-01-30 DIAGNOSIS — M54.6 CHRONIC BILATERAL THORACIC BACK PAIN: ICD-10-CM

## 2023-01-30 DIAGNOSIS — E78.2 MIXED HYPERLIPIDEMIA: ICD-10-CM

## 2023-01-30 DIAGNOSIS — F33.3 SEVERE RECURRENT MAJOR DEPRESSIVE DISORDER WITH PSYCHOTIC FEATURES (HCC): ICD-10-CM

## 2023-01-30 DIAGNOSIS — F51.01 PRIMARY INSOMNIA: ICD-10-CM

## 2023-01-30 DIAGNOSIS — G89.29 CHRONIC BILATERAL THORACIC BACK PAIN: ICD-10-CM

## 2023-01-30 DIAGNOSIS — J44.9 CHRONIC OBSTRUCTIVE PULMONARY DISEASE, UNSPECIFIED COPD TYPE (HCC): Primary | ICD-10-CM

## 2023-01-30 PROCEDURE — 4004F PT TOBACCO SCREEN RCVD TLK: CPT | Performed by: FAMILY MEDICINE

## 2023-01-30 PROCEDURE — G8420 CALC BMI NORM PARAMETERS: HCPCS | Performed by: FAMILY MEDICINE

## 2023-01-30 PROCEDURE — 3023F SPIROM DOC REV: CPT | Performed by: FAMILY MEDICINE

## 2023-01-30 PROCEDURE — 99214 OFFICE O/P EST MOD 30 MIN: CPT | Performed by: FAMILY MEDICINE

## 2023-01-30 PROCEDURE — 99406 BEHAV CHNG SMOKING 3-10 MIN: CPT | Performed by: FAMILY MEDICINE

## 2023-01-30 PROCEDURE — G8484 FLU IMMUNIZE NO ADMIN: HCPCS | Performed by: FAMILY MEDICINE

## 2023-01-30 PROCEDURE — G8427 DOCREV CUR MEDS BY ELIG CLIN: HCPCS | Performed by: FAMILY MEDICINE

## 2023-01-30 RX ORDER — BUDESONIDE, GLYCOPYRROLATE, AND FORMOTEROL FUMARATE 160; 9; 4.8 UG/1; UG/1; UG/1
2 AEROSOL, METERED RESPIRATORY (INHALATION) 2 TIMES DAILY
Qty: 1 EACH | Refills: 3 | Status: SHIPPED | OUTPATIENT
Start: 2023-01-30

## 2023-01-30 RX ORDER — GABAPENTIN 600 MG/1
TABLET ORAL
Qty: 180 TABLET | Refills: 5 | Status: SHIPPED | OUTPATIENT
Start: 2023-01-30 | End: 2023-02-28

## 2023-01-30 RX ORDER — TRAZODONE HYDROCHLORIDE 100 MG/1
100 TABLET ORAL NIGHTLY PRN
Qty: 90 TABLET | Refills: 1 | Status: SHIPPED | OUTPATIENT
Start: 2023-01-30

## 2023-01-30 ASSESSMENT — PATIENT HEALTH QUESTIONNAIRE - PHQ9
1. LITTLE INTEREST OR PLEASURE IN DOING THINGS: 1
SUM OF ALL RESPONSES TO PHQ QUESTIONS 1-9: 7
10. IF YOU CHECKED OFF ANY PROBLEMS, HOW DIFFICULT HAVE THESE PROBLEMS MADE IT FOR YOU TO DO YOUR WORK, TAKE CARE OF THINGS AT HOME, OR GET ALONG WITH OTHER PEOPLE: 1
9. THOUGHTS THAT YOU WOULD BE BETTER OFF DEAD, OR OF HURTING YOURSELF: 0
5. POOR APPETITE OR OVEREATING: 0
6. FEELING BAD ABOUT YOURSELF - OR THAT YOU ARE A FAILURE OR HAVE LET YOURSELF OR YOUR FAMILY DOWN: 0
SUM OF ALL RESPONSES TO PHQ QUESTIONS 1-9: 7
8. MOVING OR SPEAKING SO SLOWLY THAT OTHER PEOPLE COULD HAVE NOTICED. OR THE OPPOSITE, BEING SO FIGETY OR RESTLESS THAT YOU HAVE BEEN MOVING AROUND A LOT MORE THAN USUAL: 0
7. TROUBLE CONCENTRATING ON THINGS, SUCH AS READING THE NEWSPAPER OR WATCHING TELEVISION: 1
3. TROUBLE FALLING OR STAYING ASLEEP: 1
SUM OF ALL RESPONSES TO PHQ QUESTIONS 1-9: 7
SUM OF ALL RESPONSES TO PHQ QUESTIONS 1-9: 7
4. FEELING TIRED OR HAVING LITTLE ENERGY: 3
2. FEELING DOWN, DEPRESSED OR HOPELESS: 1
SUM OF ALL RESPONSES TO PHQ9 QUESTIONS 1 & 2: 2

## 2023-01-30 ASSESSMENT — ANXIETY QUESTIONNAIRES
5. BEING SO RESTLESS THAT IT IS HARD TO SIT STILL: 0
6. BECOMING EASILY ANNOYED OR IRRITABLE: 1
2. NOT BEING ABLE TO STOP OR CONTROL WORRYING: 0
IF YOU CHECKED OFF ANY PROBLEMS ON THIS QUESTIONNAIRE, HOW DIFFICULT HAVE THESE PROBLEMS MADE IT FOR YOU TO DO YOUR WORK, TAKE CARE OF THINGS AT HOME, OR GET ALONG WITH OTHER PEOPLE: SOMEWHAT DIFFICULT
GAD7 TOTAL SCORE: 3
7. FEELING AFRAID AS IF SOMETHING AWFUL MIGHT HAPPEN: 0
4. TROUBLE RELAXING: 1
1. FEELING NERVOUS, ANXIOUS, OR ON EDGE: 1
3. WORRYING TOO MUCH ABOUT DIFFERENT THINGS: 0

## 2023-01-30 ASSESSMENT — ENCOUNTER SYMPTOMS: BACK PAIN: 1

## 2023-01-30 NOTE — PROGRESS NOTES
Leon Mclaughlin is a 50 y.o. male    Chief Complaint   Patient presents with    COPD    Depression    Back Pain    Nicotine Dependence       HPI:    COPD   This is a chronic problem. The current episode started more than 1 year ago. The problem occurs daily. The problem has been gradually worsening. His symptoms are aggravated by any activity. His symptoms are alleviated by beta-agonist. He reports moderate improvement on treatment. His past medical history is significant for COPD. Major depression with psychotic features. This is a chronic condition. Depression is stable. He use to take Prozac daily. He also takes Risperdal at night. Helps calm racing thoughts. Used to hear voices and have visual hallucinations but none since being on Risperdal. Anxiety is present while wearing a seatbelt. Chronic back pain. This is a chronic problem. The current episode started more than 1 year ago. The problem occurs constantly. The problem is unchanged. The pain is present in the lumbar spine. Associated symptoms include tingling. Risk factors include sedentary lifestyle. Treatments tried: gabapentin. The treatment provided significant relief. Insomnia, primary. This is a chronic condition. Initially, Trazodone did not help but it has lately been helping. Nicotine dependence. Smokes 1 PPD for 40 years. No desire to quit smoking. He does have underlying mental illness as noted above, making Chantix a difficult option. ROS:    Review of Systems   Musculoskeletal:  Positive for back pain. Psychiatric/Behavioral:  Positive for dysphoric mood. BP (!) 148/82   Wt 132 lb (59.9 kg)   BMI 19.49 kg/m²     Physical Exam:    Physical Exam  Constitutional:       General: He is not in acute distress. Appearance: Normal appearance. He is normal weight. He is not ill-appearing or toxic-appearing. HENT:      Head: Normocephalic. Neurological:      Mental Status: He is alert.    Psychiatric:         Mood and Affect: Mood normal.         Behavior: Behavior normal.         Thought Content: Thought content normal.       Current Outpatient Medications   Medication Sig Dispense Refill    gabapentin (NEURONTIN) 600 MG tablet TAKE 2 TABLETS BY MOUTH THREE TIMES A DAY AS NEEDED FOR BACK PAIN 180 tablet 5    Budeson-Glycopyrrol-Formoterol (BREZTRI AEROSPHERE) 160-9-4.8 MCG/ACT AERO Inhale 2 puffs into the lungs in the morning and at bedtime 1 each 3    traZODone (DESYREL) 100 MG tablet Take 1 tablet by mouth nightly as needed for Sleep 90 tablet 1    albuterol sulfate HFA (PROVENTIL HFA) 108 (90 Base) MCG/ACT inhaler Inhale 2 puffs into the lungs every 6 hours as needed for Wheezing 18 g 5    ipratropium-albuterol (DUONEB) 0.5-2.5 (3) MG/3ML SOLN nebulizer solution NEBULIZE CONTENTS OF 1 VIAL EVERY 4 HOURS 180 each 2    albuterol sulfate HFA (PROVENTIL;VENTOLIN;PROAIR) 108 (90 Base) MCG/ACT inhaler INHALE 2 PUFFS EVERY 6 HOURS AS NEEDED WHEEZING 8.5 g 11    ibuprofen (ADVIL;MOTRIN) 800 MG tablet TAKE 1 TABLET BY MOUTH EVERY 8 HOURS AS NEEDED PAIN WITH FOOD 60 tablet 3    fluticasone (FLONASE) 50 MCG/ACT nasal spray 1 spray by Each Nostril route in the morning. 2 each 3    OXYGEN Inhale 4 L into the lungs      Misc. Devices KIT Shower chair. Use as directed. 1 kit 0    Respiratory Therapy Supplies (NEBULIZER/TUBING/MOUTHPIECE) KIT 1 kit by Does not apply route daily as needed (use as directed.) 1 kit 0    Respiratory Therapy Supplies (NEBULIZER/TUBING/MOUTHPIECE) KIT 1 kit by Does not apply route daily as needed (Patient is needing new equipment) 1 kit 0    risperiDONE (RISPERDAL) 3 MG tablet       aspirin 325 MG tablet Take 325 mg by mouth daily      buprenorphine (SUBUTEX) 2 MG SUBL SL tablet Place 16 mg under the tongue daily.        Nebulizer MISC by Does not apply route daily      topiramate (TOPAMAX) 50 MG tablet Take 50 mg by mouth daily      donepezil (ARICEPT) 5 MG tablet Take 1 tablet by mouth nightly 12 tablet 0     No current facility-administered medications for this visit. Assessment:    1. Cigarette nicotine dependence without complication    2. Chronic bilateral thoracic back pain    3. Simple chronic bronchitis ([address]    4. Dementia without behavioral disturbance ([address]    5. Severe recurrent major depressive disorder with psychotic features ([address]    6. Primary insomnia        Plan:    1. Chronic obstructive pulmonary disease, unspecified COPD type ([address]  Stable. Continue current medications. - CBC Auto Differential  - Comprehensive Metabolic Panel    2. Chronic bilateral thoracic back pain  Stable. Continue current medications. 3. Cigarette nicotine dependence without complication  No desire to quit smoking. Encouraged tobacco cessation nevertheless for 3 minutes. 4. Severe recurrent major depressive disorder with psychotic features ([facility] [address]  Stable. Continue current medications. Dementia is stable as well. 5. Mixed hyperlipidemia  Will recheck fasting labs on the next visit    The 10-year ASCVD risk score (Gregoria DANIELS, et al., 2019) is: 9.8%    Values used to calculate the score:      Age: 50 years      Sex: Male      Is Non- : No      Diabetic: No      Tobacco smoker: Yes      Systolic Blood Pressure: 134 mmHg      Is BP treated: No      HDL Cholesterol: 42 mg/dL      Total Cholesterol: 184 mg/dL    Flu shot declined. Colon cancer screening declined. Return in about 6 months (around 7/30/2023) for COPD, Low Back Pain, Tobacco abuse.

## (undated) DEVICE — GAUZE,SPONGE,4"X4",8PLY,STRL,LF,10/TRAY: Brand: MEDLINE

## (undated) DEVICE — GLOVE ORANGE PI 7 1/2   MSG9075

## (undated) DEVICE — HANDPIECE SET WITH HIGH FLOW TIP AND SUCTION TUBE: Brand: INTERPULSE

## (undated) DEVICE — Z DISCONTINUED NO SUB IDED DRAIN PENROSE L12IN 0.25IN USED TO PROMOTE DRNAGE IN OPN

## (undated) DEVICE — SURGICAL NERVE STIMULATOR/LOCATOR: Brand: CHECKPOINT

## (undated) DEVICE — SUTURE VCRL + SZ 2-0 L18IN ABSRB UD CT1 L36MM 1/2 CIR VCP839D

## (undated) DEVICE — SMARTGOWN BREATHABLE SURGICAL GOWN: Brand: CONVERTORS

## (undated) DEVICE — GOWN SIRUS NONREIN XL W/TWL: Brand: MEDLINE INDUSTRIES, INC.

## (undated) DEVICE — COVER,TABLE,HEAVY DUTY,50"X90",STRL: Brand: MEDLINE

## (undated) DEVICE — SMARTGOWN SURGICAL GOWN, XL: Brand: CONVERTORS

## (undated) DEVICE — SUTURE VCRL + SZ 0 L18IN ABSRB UD L36MM CT-1 1/2 CIR VCP840D

## (undated) DEVICE — SPONGE LAP W18XL18IN WHT STRUNG W/ RNG W/OUT LOOP RADPQ ST

## (undated) DEVICE — GLOVE SURG SZ 65 THK91MIL LTX FREE SYN POLYISOPRENE

## (undated) DEVICE — PADDING CAST W4INXL4YD ST COT RAYON MICROPLEATED HIGHLY

## (undated) DEVICE — PENCIL ES L3M BTTN SWCH S STL HEX LOK BLDE ELECTRD HOLSTER

## (undated) DEVICE — SUTURE FIBERWIRE SZ 5 L38IN BLU UHMWPE COMP BRAID AR7210

## (undated) DEVICE — STERILE LATEX POWDER-FREE SURGICAL GLOVES WITH HYDROGEL COATING, SMOOTH FINISH, STRAIGHT FINGER: Brand: PROTEXIS

## (undated) DEVICE — OCCLUSIVE GAUZE STRIP OVERWRAP,3% BISMUTH TRIBROMOPHENATE IN PETROLATUM BLEND: Brand: XEROFORM

## (undated) DEVICE — SHEET,DRAPE,53X77,STERILE: Brand: MEDLINE

## (undated) DEVICE — SPLINT ORTH W4XL30IN LAYERED FBRGLS FOAM PD BRTH BK MOLD

## (undated) DEVICE — COVER XR CASS W20XL41IN UNIV ADH STRP

## (undated) DEVICE — BIT DRL DIA2.7MM CALIB

## (undated) DEVICE — SUTURE ETHLN SZ 3-0 L30IN NONABSORBABLE BLK FSL L30MM 3/8 1671H

## (undated) DEVICE — Z CONVERTED USE 2273164 BANDAGE COMPR W4INXL4 1/2YD E EC SGL LAYERED CLP CLSR ECONO

## (undated) DEVICE — Z CONVERTED USE 2271043 CONTAINER SPEC COLL 4OZ SCR ON LID PEEL PCH

## (undated) DEVICE — STAPLER SKIN H3.9MM WIRE DIA0.58MM CRWN 6.9MM 35 STPL FIX

## (undated) DEVICE — PAD,ABDOMINAL,8"X10",ST,LF: Brand: MEDLINE

## (undated) DEVICE — SOLUTION IV IRRIG POUR BRL 0.9% SODIUM CHL 2F7124

## (undated) DEVICE — TUBING SUCT 10FR MAL ALUM SHFT FN CAP VENT UNIV CONN W/ OBT

## (undated) DEVICE — 3M™ IOBAN™ 2 ANTIMICROBIAL INCISE DRAPE 6650EZ: Brand: IOBAN™ 2

## (undated) DEVICE — PADDING UNDERCAST W4INXL4YD 100% COT CRIMPED FINISH WBRL II

## (undated) DEVICE — TUBING, SUCTION, 3/16" X 12', STRAIGHT: Brand: MEDLINE

## (undated) DEVICE — BASIC SINGLE BASIN 1-LF: Brand: MEDLINE INDUSTRIES, INC.

## (undated) DEVICE — PACK PROCEDURE SURG EXTREMITY SORGER CDS

## (undated) DEVICE — GLOVE ORANGE PI 8   MSG9080

## (undated) DEVICE — STERILE LATEX POWDER-FREE SURGICAL GLOVESWITH NITRILE COATING: Brand: PROTEXIS

## (undated) DEVICE — TOWEL,OR,DSP,ST,BLUE,STD,6/PK,12PK/CS: Brand: MEDLINE